# Patient Record
Sex: FEMALE | Race: WHITE | NOT HISPANIC OR LATINO | Employment: OTHER | URBAN - METROPOLITAN AREA
[De-identification: names, ages, dates, MRNs, and addresses within clinical notes are randomized per-mention and may not be internally consistent; named-entity substitution may affect disease eponyms.]

---

## 2017-01-10 ENCOUNTER — TELEPHONE (OUTPATIENT)
Dept: INTERNAL MEDICINE | Facility: CLINIC | Age: 67
End: 2017-01-10

## 2017-01-10 DIAGNOSIS — K29.80 DUODENITIS: ICD-10-CM

## 2017-01-29 DIAGNOSIS — R41.3 MEMORY LOSS: ICD-10-CM

## 2017-01-30 RX ORDER — DONEPEZIL HYDROCHLORIDE 10 MG/1
TABLET, FILM COATED ORAL
Qty: 90 TABLET | Refills: 0 | Status: SHIPPED | OUTPATIENT
Start: 2017-01-30 | End: 2017-02-01 | Stop reason: ALTCHOICE

## 2017-01-30 RX ORDER — LOSARTAN POTASSIUM AND HYDROCHLOROTHIAZIDE 12.5; 5 MG/1; MG/1
TABLET ORAL
Qty: 90 TABLET | Refills: 0 | Status: SHIPPED | OUTPATIENT
Start: 2017-01-30 | End: 2017-02-01 | Stop reason: SDUPTHER

## 2017-02-01 ENCOUNTER — OFFICE VISIT (OUTPATIENT)
Dept: INTERNAL MEDICINE | Facility: CLINIC | Age: 67
End: 2017-02-01
Payer: MEDICARE

## 2017-02-01 VITALS
BODY MASS INDEX: 36.52 KG/M2 | SYSTOLIC BLOOD PRESSURE: 124 MMHG | WEIGHT: 198.44 LBS | HEART RATE: 68 BPM | HEIGHT: 62 IN | DIASTOLIC BLOOD PRESSURE: 80 MMHG

## 2017-02-01 DIAGNOSIS — F33.41 RECURRENT MAJOR DEPRESSIVE DISORDER, IN PARTIAL REMISSION: ICD-10-CM

## 2017-02-01 DIAGNOSIS — E03.9 PRIMARY HYPOTHYROIDISM: ICD-10-CM

## 2017-02-01 DIAGNOSIS — R19.5 POSITIVE OCCULT STOOL BLOOD TEST: ICD-10-CM

## 2017-02-01 DIAGNOSIS — F07.81 CONCUSSION SYNDROME: ICD-10-CM

## 2017-02-01 DIAGNOSIS — R41.3 MEMORY LOSS: ICD-10-CM

## 2017-02-01 DIAGNOSIS — L65.9 HAIR LOSS: ICD-10-CM

## 2017-02-01 DIAGNOSIS — E61.1 IRON DEFICIENCY: Primary | ICD-10-CM

## 2017-02-01 DIAGNOSIS — G25.81 RLS (RESTLESS LEGS SYNDROME): ICD-10-CM

## 2017-02-01 DIAGNOSIS — Z78.0 POSTMENOPAUSAL STATUS: ICD-10-CM

## 2017-02-01 DIAGNOSIS — G25.0 TREMOR, ESSENTIAL: ICD-10-CM

## 2017-02-01 DIAGNOSIS — I10 ESSENTIAL HYPERTENSION: ICD-10-CM

## 2017-02-01 PROCEDURE — 1126F AMNT PAIN NOTED NONE PRSNT: CPT | Mod: S$GLB,,, | Performed by: INTERNAL MEDICINE

## 2017-02-01 PROCEDURE — 1160F RVW MEDS BY RX/DR IN RCRD: CPT | Mod: S$GLB,,, | Performed by: INTERNAL MEDICINE

## 2017-02-01 PROCEDURE — 1159F MED LIST DOCD IN RCRD: CPT | Mod: S$GLB,,, | Performed by: INTERNAL MEDICINE

## 2017-02-01 PROCEDURE — 3079F DIAST BP 80-89 MM HG: CPT | Mod: S$GLB,,, | Performed by: INTERNAL MEDICINE

## 2017-02-01 PROCEDURE — 1157F ADVNC CARE PLAN IN RCRD: CPT | Mod: S$GLB,,, | Performed by: INTERNAL MEDICINE

## 2017-02-01 PROCEDURE — 99999 PR PBB SHADOW E&M-EST. PATIENT-LVL III: CPT | Mod: PBBFAC,,, | Performed by: INTERNAL MEDICINE

## 2017-02-01 PROCEDURE — 3074F SYST BP LT 130 MM HG: CPT | Mod: S$GLB,,, | Performed by: INTERNAL MEDICINE

## 2017-02-01 PROCEDURE — 99214 OFFICE O/P EST MOD 30 MIN: CPT | Mod: S$GLB,,, | Performed by: INTERNAL MEDICINE

## 2017-02-01 RX ORDER — LOSARTAN POTASSIUM AND HYDROCHLOROTHIAZIDE 12.5; 5 MG/1; MG/1
1 TABLET ORAL EVERY MORNING
Qty: 90 TABLET | Refills: 3 | Status: SHIPPED | OUTPATIENT
Start: 2017-02-01 | End: 2017-05-02 | Stop reason: SDUPTHER

## 2017-02-01 RX ORDER — PANTOPRAZOLE SODIUM 20 MG/1
20 TABLET, DELAYED RELEASE ORAL EVERY MORNING
Qty: 90 TABLET | Refills: 3 | Status: SHIPPED | OUTPATIENT
Start: 2017-02-01 | End: 2018-01-26 | Stop reason: SDUPTHER

## 2017-02-01 RX ORDER — PRAMIPEXOLE DIHYDROCHLORIDE 0.5 MG/1
TABLET ORAL
Qty: 90 TABLET | Refills: 0 | Status: SHIPPED | OUTPATIENT
Start: 2017-02-01 | End: 2017-05-02 | Stop reason: ALTCHOICE

## 2017-02-01 RX ORDER — TRAZODONE HYDROCHLORIDE 50 MG/1
50 TABLET ORAL NIGHTLY
Qty: 30 TABLET | Refills: 11
Start: 2017-02-01 | End: 2017-05-02 | Stop reason: SDUPTHER

## 2017-02-01 RX ORDER — KETOCONAZOLE 20 MG/ML
SHAMPOO, SUSPENSION TOPICAL
Qty: 120 ML | Refills: 4 | Status: SHIPPED | OUTPATIENT
Start: 2017-02-02 | End: 2018-08-16

## 2017-02-01 RX ORDER — DONEPEZIL HYDROCHLORIDE 5 MG/1
5 TABLET, FILM COATED ORAL NIGHTLY
Qty: 90 TABLET | Refills: 4 | Status: SHIPPED | OUTPATIENT
Start: 2017-02-01 | End: 2017-05-02 | Stop reason: SDUPTHER

## 2017-02-01 NOTE — PATIENT INSTRUCTIONS
Health Maintenance       Date Due Completion Date    DEXA SCAN 5/11/1989 ---    Colonoscopy 5/11/1999 ---    Zoster Vaccine 5/11/2009 ---    Influenza Vaccine 8/1/2016 12/1/2015    Mammogram 8/29/2018 8/29/2016    Lipid Panel 8/25/2021 8/25/2016    TETANUS VACCINE 11/4/2024 11/4/2014

## 2017-02-01 NOTE — MR AVS SNAPSHOT
[facility]  [address]  [facility] [address]  Phone: [phone]  Fax: [phone]                  Trinity Valenzuela   2017 1:20 PM   Office Visit    Description:  Female : 1950   Provider:  Jesika Sanders MD   Department:  [facility]           Reason for Visit     Follow-up           Diagnoses this Visit        Comments    Iron deficiency    -  Primary     Positive occult stool blood test         Primary hypothyroidism         Hair loss         Recurrent major depressive disorder, in partial remission         RLS (restless legs syndrome)         Tremor, essential         Concussion syndrome         Memory loss         Essential hypertension         Postmenopausal status                To Do List           Goals (5 Years of Data)     None      Follow-Up and Disposition     Return in about 3 months (around 2017).       These Medications        Disp Refills Start End    ketoconazole (NIZORAL) 2 % shampoo 120 mL 4 2017     Apply topically twice a week. - Topical (Top)    Pharmacy: [facility] [address] [place] [address] [address] Ph #: [phone]       trazodone (DESYREL) 50 MG tablet 30 tablet 11 2017    Take 1 tablet (50 mg total) by mouth every evening. - Oral    Pharmacy: [address] [address] Ph #: [phone]       losartan-hydrochlorothiazide 50-12.5 mg (HYZAAR) 50-12.5 mg per tablet 90 tablet 3 2017     Take 1 tablet by mouth every morning. - Oral    Pharmacy: [address] Ph #: [phone]       donepezil (ARICEPT) 5 MG tablet 90 tablet 4 2017    Take 1 tablet (5 mg total) by mouth every evening. - Oral    Pharmacy: [facility] [address] [place] [address] [facility] [address] Ph #: [phone]       pantoprazole (PROTONIX) 20 MG tablet 90 tablet 3 2017    Take 1 tablet (20 mg total) by mouth every morning. - Oral     Pharmacy: [facility] [address] [place] [address], [place] - [address] Ph #: [phone]       pramipexole (MIRAPEX) 0.5 MG tablet 90 tablet 0 2/1/2017     Nightly by mouth prn    Pharmacy: [facility] [address] [place] [address], [place] - [address] Ph #: [phone]         [facility] On Call     [facility] On Call Nurse [facility] Line - 24/7 Assistance  Registered nurses in the [facility] provide clinical advisement, health education, appointment booking, and other advisory services.  Call for this free service at [phone].             Medications           Message regarding Medications     Verify the changes and/or additions to your medication regime listed below are the same as discussed with your clinician today.  If any of these changes or additions are incorrect, please notify your healthcare provider.        START taking these NEW medications        Refills    trazodone (DESYREL) 50 MG tablet 11    Sig: Take 1 tablet (50 mg total) by mouth every evening.    Class: No Print    Route: Oral    donepezil (ARICEPT) 5 MG tablet 4    Sig: Take 1 tablet (5 mg total) by mouth every evening.    Class: Normal    Route: Oral    pantoprazole (PROTONIX) 20 MG tablet 3    Sig: Take 1 tablet (20 mg total) by mouth every morning.    Class: Normal    Route: Oral      CHANGE how you are taking these medications     Start Taking Instead of    ketoconazole (NIZORAL) 2 % shampoo ketoconazole (NIZORAL) 2 % shampoo    Dosage:  Apply topically twice a week. Dosage:  Apply topically twice a week.    Reason for Change:  Reorder     Starting on: 2/2/2017     losartan-hydrochlorothiazide 50-12.5 mg (HYZAAR) 50-12.5 mg per tablet losartan-hydrochlorothiazide 50-12.5 mg (HYZAAR) 50-12.5 mg per tablet    Dosage:  Take 1 tablet by mouth every morning. Dosage:  TAKE ONE TABLET BY MOUTH ONCE DAILY    Reason for Change:  Reorder            Verify that the below list of medications is an accurate representation of the medications you are currently  "taking.  If none reported, the list may be blank. If incorrect, please contact your healthcare provider. Carry this list with you in case of emergency.           Current Medications     aspirin (ECOTRIN) 81 MG EC tablet Take 81 mg by mouth once daily.    citalopram (CELEXA) 40 MG tablet Take 1 tablet (40 mg total) by mouth once daily.    ketoconazole (NIZORAL) 2 % shampoo Starting on Feb 02, 2017. Apply topically twice a week.    levocetirizine (XYZAL) 5 MG tablet Take 1 tablet (5 mg total) by mouth every evening.    levothyroxine (SYNTHROID) 100 MCG tablet Take 1 tablet (100 mcg total) by mouth before breakfast.    losartan-hydrochlorothiazide 50-12.5 mg (HYZAAR) 50-12.5 mg per tablet Take 1 tablet by mouth every morning.    pramipexole (MIRAPEX) 0.5 MG tablet Nightly by mouth prn    donepezil (ARICEPT) 5 MG tablet Take 1 tablet (5 mg total) by mouth every evening.    pantoprazole (PROTONIX) 20 MG tablet Take 1 tablet (20 mg total) by mouth every morning.    trazodone (DESYREL) 50 MG tablet Take 1 tablet (50 mg total) by mouth every evening.           Clinical Reference Information           Vital Signs - Last Recorded  Most recent update: 2/1/2017  1:24 PM by Loco Villeda MA    BP Pulse Ht Wt BMI    124/80 68 5' 2" (1.575 m) 90 kg (198 lb 6.6 oz) 36.29 kg/m2      Blood Pressure          Most Recent Value    BP  124/80      Allergies as of 2/1/2017     No Known Allergies      Immunizations Administered on Date of Encounter - 2/1/2017     None      Orders Placed During Today's Visit      Normal Orders This Visit    Ambulatory Referral to Psychiatry     Future Labs/Procedures Expected by Expires    DXA Bone Density Spine And Hip  2/1/2017 2/1/2018    CBC auto differential  3/6/2017 2/1/2018    Comprehensive metabolic panel  3/6/2017 4/2/2018    Ferritin  3/6/2017 2/1/2018    TSH  3/6/2017 4/2/2018    Uric acid  3/6/2017 4/2/2018      Instructions    Health Maintenance       Date Due Completion Date    DEXA " SCAN 5/11/1989 ---    Colonoscopy 5/11/1999 ---    Zoster Vaccine 5/11/2009 ---    Influenza Vaccine 8/1/2016 12/1/2015    Mammogram 8/29/2018 8/29/2016    Lipid Panel 8/25/2021 8/25/2016    TETANUS VACCINE 11/4/2024 11/4/2014

## 2017-02-02 NOTE — PROGRESS NOTES
Subjective:       Patient ID: Trinity Valenzuela is a 66 y.o. female.    Chief Complaint: Follow-up  She has had an EGD and colonoscopy as well as a capsule video examination of her entire gastrointestinal tract with no source for the GI bleeding discovered.  The plan will be to monitor her blood count every 3 months or sooner if she has symptoms.    I will also monitor and treat her Essential hypertension, general medical problems and her thyroid problems.    I would however like for her To establish with a psychiatrist to manage her major depression and other neurologic issues. She is doing well now and I will refill all of her prescriptions  However, long-term I think she should establish with a psychiatrist.  HPI  Review of Systems   Constitutional: Negative for activity change, appetite change, chills, fatigue, fever and unexpected weight change.   HENT: Negative for hearing loss.    Eyes: Negative for visual disturbance.   Respiratory: Negative for cough, chest tightness, shortness of breath and wheezing.    Cardiovascular: Negative for chest pain, palpitations and leg swelling.   Gastrointestinal: Negative for abdominal pain, constipation, nausea and vomiting.   Genitourinary: Negative for dysuria, frequency and urgency.   Musculoskeletal: Negative for arthralgias, back pain, gait problem, joint swelling and myalgias.   Skin: Negative for rash.   Neurological: Negative for light-headedness and headaches.   Psychiatric/Behavioral: Negative for dysphoric mood and sleep disturbance. The patient is not nervous/anxious.        Objective:      Physical Exam   Constitutional: She appears well-nourished.   HENT:   Head: Atraumatic.   Eyes: Conjunctivae are normal. No scleral icterus.   Neck: Neck supple.   Cardiovascular: Normal rate and regular rhythm.    Pulmonary/Chest: Effort normal and breath sounds normal.   Abdominal: Soft. There is no tenderness.   Musculoskeletal: She exhibits no edema.   Lymphadenopathy:      She has no cervical adenopathy.   Neurological: She is alert.   Skin: Skin is warm and dry.   Psychiatric: She has a normal mood and affect. Her behavior is normal.   Nursing note and vitals reviewed.      Assessment:       1. Iron deficiency    2. Positive occult stool blood test    3. Primary hypothyroidism    4. Hair loss    5. Recurrent major depressive disorder, in partial remission    6. RLS (restless legs syndrome)    7. Tremor, essential    8. Concussion syndrome    9. Memory loss    10. Essential hypertension    11. Postmenopausal status        Plan:   Trinity was seen today for follow-up.    Diagnoses and all orders for this visit:    Iron deficiency  -     CBC auto differential; Future  -     Ferritin; Future    Positive occult stool blood test  -     CBC auto differential; Future    Primary hypothyroidism  -     TSH; Future    Hair loss    Recurrent major depressive disorder, in partial remission  -     Ambulatory Referral to Psychiatry    RLS (restless legs syndrome)  -     Ambulatory Referral to Psychiatry    Tremor, essential  -     Ambulatory Referral to Psychiatry    Concussion syndrome  -     Ambulatory Referral to Psychiatry    Memory loss  -     Ambulatory Referral to Psychiatry    Essential hypertension  -     Comprehensive metabolic panel; Future  -     Uric acid; Future    Postmenopausal status  -     DXA Bone Density Spine And Hip; Future    Other orders  -     ketoconazole (NIZORAL) 2 % shampoo; Apply topically twice a week.  -     trazodone (DESYREL) 50 MG tablet; Take 1 tablet (50 mg total) by mouth every evening.  -     losartan-hydrochlorothiazide 50-12.5 mg (HYZAAR) 50-12.5 mg per tablet; Take 1 tablet by mouth every morning.  -     donepezil (ARICEPT) 5 MG tablet; Take 1 tablet (5 mg total) by mouth every evening.  -     pantoprazole (PROTONIX) 20 MG tablet; Take 1 tablet (20 mg total) by mouth every morning.  -     pramipexole (MIRAPEX) 0.5 MG tablet; Nightly by mouth prn

## 2017-03-28 ENCOUNTER — TELEPHONE (OUTPATIENT)
Dept: INTERNAL MEDICINE | Facility: CLINIC | Age: 67
End: 2017-03-28

## 2017-03-28 ENCOUNTER — PATIENT MESSAGE (OUTPATIENT)
Dept: INTERNAL MEDICINE | Facility: CLINIC | Age: 67
End: 2017-03-28

## 2017-03-28 NOTE — TELEPHONE ENCOUNTER
Dear Herlinda,  She is scheduled for bilateral eyelid blepharoplasty with Dr. Doron Santos on May 30, 2017.  She is cleared from a medical and cardiac standpoint at a low risk for this procedure will need her most recent office visit dated February 1, 2017 and her most recent labs dated March 21, 2017 faxed to Dr. Santos's office or emailed  I have placed all of this information on your desk.  Sincerely, Dr. Jesika Sanders

## 2017-03-29 ENCOUNTER — PATIENT MESSAGE (OUTPATIENT)
Dept: INTERNAL MEDICINE | Facility: CLINIC | Age: 67
End: 2017-03-29

## 2017-03-29 DIAGNOSIS — I10 ESSENTIAL HYPERTENSION: ICD-10-CM

## 2017-03-29 DIAGNOSIS — F90.0 ATTENTION DEFICIT HYPERACTIVITY DISORDER (ADHD), PREDOMINANTLY INATTENTIVE TYPE: Primary | ICD-10-CM

## 2017-03-31 ENCOUNTER — HOSPITAL ENCOUNTER (OUTPATIENT)
Dept: CARDIOLOGY | Facility: CLINIC | Age: 67
Discharge: HOME | End: 2017-03-31
Payer: MEDICARE

## 2017-03-31 ENCOUNTER — TELEPHONE (OUTPATIENT)
Dept: INTERNAL MEDICINE | Facility: CLINIC | Age: 67
End: 2017-03-31

## 2017-03-31 DIAGNOSIS — F90.0 ATTENTION DEFICIT HYPERACTIVITY DISORDER (ADHD), PREDOMINANTLY INATTENTIVE TYPE: ICD-10-CM

## 2017-03-31 DIAGNOSIS — I10 ESSENTIAL HYPERTENSION: ICD-10-CM

## 2017-03-31 PROCEDURE — 93000 ELECTROCARDIOGRAM COMPLETE: CPT | Mod: S$GLB,,, | Performed by: INTERNAL MEDICINE

## 2017-03-31 NOTE — TELEPHONE ENCOUNTER
She is scheduled for bilateral eyelid blepharoplasty with Dr. Doron Santos on May 30, 2017.  Please Fax her EKG  to [phone]. Thank you very much.

## 2017-04-11 ENCOUNTER — PATIENT MESSAGE (OUTPATIENT)
Dept: INTERNAL MEDICINE | Facility: CLINIC | Age: 67
End: 2017-04-11

## 2017-05-02 ENCOUNTER — OFFICE VISIT (OUTPATIENT)
Dept: INTERNAL MEDICINE | Facility: CLINIC | Age: 67
End: 2017-05-02
Payer: MEDICARE

## 2017-05-02 VITALS
HEIGHT: 62 IN | DIASTOLIC BLOOD PRESSURE: 80 MMHG | WEIGHT: 192.44 LBS | SYSTOLIC BLOOD PRESSURE: 120 MMHG | HEART RATE: 71 BPM | BODY MASS INDEX: 35.41 KG/M2 | OXYGEN SATURATION: 95 %

## 2017-05-02 DIAGNOSIS — F07.81 CONCUSSION SYNDROME: ICD-10-CM

## 2017-05-02 DIAGNOSIS — G25.0 TREMOR, ESSENTIAL: ICD-10-CM

## 2017-05-02 DIAGNOSIS — R41.3 MEMORY LOSS: ICD-10-CM

## 2017-05-02 DIAGNOSIS — Z86.19 HISTORY OF HEPATITIS C: ICD-10-CM

## 2017-05-02 DIAGNOSIS — G25.81 RLS (RESTLESS LEGS SYNDROME): ICD-10-CM

## 2017-05-02 DIAGNOSIS — R16.0 HEPATOMEGALY: ICD-10-CM

## 2017-05-02 DIAGNOSIS — F33.41 RECURRENT MAJOR DEPRESSIVE DISORDER, IN PARTIAL REMISSION: ICD-10-CM

## 2017-05-02 DIAGNOSIS — E61.1 IRON DEFICIENCY: ICD-10-CM

## 2017-05-02 DIAGNOSIS — E03.9 PRIMARY HYPOTHYROIDISM: ICD-10-CM

## 2017-05-02 DIAGNOSIS — K21.9 GASTROESOPHAGEAL REFLUX DISEASE WITHOUT ESOPHAGITIS: ICD-10-CM

## 2017-05-02 DIAGNOSIS — K29.80 DUODENITIS: ICD-10-CM

## 2017-05-02 DIAGNOSIS — I10 ESSENTIAL HYPERTENSION: ICD-10-CM

## 2017-05-02 DIAGNOSIS — G47.00 INSOMNIA, UNSPECIFIED TYPE: ICD-10-CM

## 2017-05-02 DIAGNOSIS — D50.0 IRON DEFICIENCY ANEMIA DUE TO CHRONIC BLOOD LOSS: Primary | ICD-10-CM

## 2017-05-02 PROCEDURE — 3074F SYST BP LT 130 MM HG: CPT | Mod: S$GLB,,, | Performed by: INTERNAL MEDICINE

## 2017-05-02 PROCEDURE — 99999 PR PBB SHADOW E&M-EST. PATIENT-LVL III: CPT | Mod: PBBFAC,,, | Performed by: INTERNAL MEDICINE

## 2017-05-02 PROCEDURE — 3079F DIAST BP 80-89 MM HG: CPT | Mod: S$GLB,,, | Performed by: INTERNAL MEDICINE

## 2017-05-02 PROCEDURE — 99214 OFFICE O/P EST MOD 30 MIN: CPT | Mod: S$GLB,,, | Performed by: INTERNAL MEDICINE

## 2017-05-02 PROCEDURE — 1159F MED LIST DOCD IN RCRD: CPT | Mod: S$GLB,,, | Performed by: INTERNAL MEDICINE

## 2017-05-02 PROCEDURE — 1126F AMNT PAIN NOTED NONE PRSNT: CPT | Mod: S$GLB,,, | Performed by: INTERNAL MEDICINE

## 2017-05-02 PROCEDURE — 1160F RVW MEDS BY RX/DR IN RCRD: CPT | Mod: S$GLB,,, | Performed by: INTERNAL MEDICINE

## 2017-05-02 RX ORDER — CITALOPRAM 40 MG/1
40 TABLET, FILM COATED ORAL DAILY
Qty: 90 TABLET | Refills: 2 | Status: SHIPPED | OUTPATIENT
Start: 2017-05-02 | End: 2018-03-02 | Stop reason: SDUPTHER

## 2017-05-02 RX ORDER — TRAZODONE HYDROCHLORIDE 50 MG/1
50 TABLET ORAL NIGHTLY
Qty: 90 TABLET | Refills: 3
Start: 2017-05-02 | End: 2018-03-29

## 2017-05-02 RX ORDER — DONEPEZIL HYDROCHLORIDE 5 MG/1
5 TABLET, FILM COATED ORAL NIGHTLY
Qty: 90 TABLET | Refills: 4 | Status: SHIPPED | OUTPATIENT
Start: 2017-05-02 | End: 2018-03-29 | Stop reason: SDUPTHER

## 2017-05-02 RX ORDER — LOSARTAN POTASSIUM AND HYDROCHLOROTHIAZIDE 12.5; 5 MG/1; MG/1
1 TABLET ORAL EVERY MORNING
Qty: 90 TABLET | Refills: 3 | Status: SHIPPED | OUTPATIENT
Start: 2017-05-02 | End: 2018-03-29 | Stop reason: SDUPTHER

## 2017-05-02 RX ORDER — ASPIRIN 81 MG/1
81 TABLET ORAL DAILY
Qty: 100 TABLET | Refills: 3
Start: 2017-05-02 | End: 2018-03-29 | Stop reason: SINTOL

## 2017-05-02 RX ORDER — LEVOTHYROXINE SODIUM 100 UG/1
100 TABLET ORAL
Qty: 90 TABLET | Refills: 3 | Status: SHIPPED | OUTPATIENT
Start: 2017-05-02 | End: 2017-07-26 | Stop reason: SDUPTHER

## 2017-05-02 NOTE — MR AVS SNAPSHOT
[facility]  [address]  [facility] [address]  Phone: [phone]  Fax: [phone]                  Trinity Valenzuela   2017 10:40 AM   Office Visit    Description:  Female : 1950   Provider:  Jesika Sanders MD   Department:  [facility]           Reason for Visit     Follow-up           Diagnoses this Visit        Comments    Iron deficiency anemia due to chronic blood loss    -  Primary     History of hepatitis C         Tremor, essential         RLS (restless legs syndrome)         Insomnia, unspecified type         Iron deficiency         Primary hypothyroidism         Memory loss         Recurrent major depressive disorder, in partial remission         Essential hypertension         Gastroesophageal reflux disease without esophagitis         Duodenitis         Concussion syndrome         Hepatomegaly                To Do List           Goals (5 Years of Data)     None      Follow-Up and Disposition     Return in about 6 months (around 2017).       These Medications        Disp Refills Start End    trazodone (DESYREL) 50 MG tablet 90 tablet 3 2017    Take 1 tablet (50 mg total) by mouth every evening. - Oral    Pharmacy: [address] [address] Ph #: [phone]       losartan-hydrochlorothiazide 50-12.5 mg (HYZAAR) 50-12.5 mg per tablet 90 tablet 3 2017     Take 1 tablet by mouth every morning. - Oral    Pharmacy: [address] [address] [facility] [address] Ph #: [phone]       levothyroxine (SYNTHROID) 100 MCG tablet 90 tablet 3 2017     Take 1 tablet (100 mcg total) by mouth before breakfast. - Oral    Pharmacy: [address] [address] [facility] [address] Ph #: [phone]       donepezil (ARICEPT) 5 MG tablet 90 tablet 4 2017    Take 1 tablet (5 mg total) by mouth every evening. - Oral    Pharmacy: [address] [address] [facility] [address]  Ph #: [phone]       citalopram (CELEXA) 40 MG tablet 90 tablet 2 5/2/2017 5/2/2018    Take 1 tablet (40 mg total) by mouth once daily. - Oral    Pharmacy: [facility] - [place], [place] - [address] [address] Ph #: [phone]       aspirin (ECOTRIN) 81 MG EC tablet 100 tablet 3 5/2/2017     Take 1 tablet (81 mg total) by mouth once daily. - Oral    Pharmacy: [facility]  [place] - [address] [address] Ph #: [phone]         [facility] On Call     [facility] On Call Nurse Care Line - 24/7 Assistance  Unless otherwise directed by your provider, please contact [facility], our nurse care line that is available for 24/7 assistance.     Registered nurses in the [facility] provide: appointment scheduling, clinical advisement, health education, and other advisory services.  Call: [phone] (toll free)               Medications           Message regarding Medications     Verify the changes and/or additions to your medication regime listed below are the same as discussed with your clinician today.  If any of these changes or additions are incorrect, please notify your healthcare provider.        CHANGE how you are taking these medications     Start Taking Instead of    aspirin (ECOTRIN) 81 MG EC tablet aspirin (ECOTRIN) 81 MG EC tablet    Dosage:  Take 1 tablet (81 mg total) by mouth once daily. Dosage:  Take 81 mg by mouth once daily.    Reason for Change:  Reorder       STOP taking these medications     pramipexole (MIRAPEX) 0.5 MG tablet Nightly by mouth prn    levocetirizine (XYZAL) 5 MG tablet Take 1 tablet (5 mg total) by mouth every evening.           Verify that the below list of medications is an accurate representation of the medications you are currently taking.  If none reported, the list may be blank. If incorrect, please contact your healthcare provider. Carry this list with you in case of emergency.           Current Medications     aspirin (ECOTRIN) 81 MG EC tablet Take 1 tablet  "(81 mg total) by mouth once daily.    citalopram (CELEXA) 40 MG tablet Take 1 tablet (40 mg total) by mouth once daily.    donepezil (ARICEPT) 5 MG tablet Take 1 tablet (5 mg total) by mouth every evening.    ketoconazole (NIZORAL) 2 % shampoo Apply topically twice a week.    levothyroxine (SYNTHROID) 100 MCG tablet Take 1 tablet (100 mcg total) by mouth before breakfast.    losartan-hydrochlorothiazide 50-12.5 mg (HYZAAR) 50-12.5 mg per tablet Take 1 tablet by mouth every morning.    pantoprazole (PROTONIX) 20 MG tablet Take 1 tablet (20 mg total) by mouth every morning.    trazodone (DESYREL) 50 MG tablet Take 1 tablet (50 mg total) by mouth every evening.           Clinical Reference Information           Your Vitals Were     BP Pulse Height Weight SpO2 BMI    120/80 71 5' 2" (1.575 m) 87.3 kg (192 lb 7.4 oz) 95% 35.2 kg/m2      Blood Pressure          Most Recent Value    BP  120/80      Allergies as of 5/2/2017     No Known Allergies      Immunizations Administered on Date of Encounter - 5/2/2017     None      Orders Placed During Today's Visit     Future Labs/Procedures Expected by Expires    US Abdomen Complete  5/2/2017 5/2/2018    CBC auto differential  7/24/2017 5/2/2018    Comprehensive metabolic panel  7/24/2017 7/1/2018    Ferritin  7/24/2017 5/2/2018    HEPATITIS C RNA, QUANTITATIVE, PCR  7/24/2017 7/1/2018    TSH  7/24/2017 7/1/2018      Language Assistance Services     ATTENTION: Language assistance services are available, free of charge. Please call [phone].      ATENCIÓN: Si habla español, tiene a schulte disposición servicios gratuitos de asistencia lingüística. Llame al [phone].     VICTOR HUGO Ý: N?u b?n nói Ti?ng Vi?t, có các d?ch v? h? tr? ngôn ng? mi?n phí dành cho b?n. G?i s? [phone].         [facility] complies with applicable Federal civil rights laws and does not discriminate on the basis of race, color, national origin, age, disability, or sex.        "

## 2017-05-03 NOTE — PROGRESS NOTES
Subjective:       Patient ID: Trinity Valenzuela is a 66 y.o. female.    Chief Complaint: Follow-up  She is here to address all medical problems.  As far as she knows, she hasn't lost any blood.  She doesn't think hepatitis C he is active.  She's not having as much trouble with tremor or restless leg syndrome.  She is sleeping better.  She is taking an iron tablet every day.  Her memory is stable.  Her depression is under good control.  HPI  Review of Systems   Constitutional: Negative for activity change, appetite change, chills, fatigue, fever and unexpected weight change.   HENT: Negative for hearing loss.    Eyes: Negative for visual disturbance.   Respiratory: Negative for cough, chest tightness, shortness of breath and wheezing.    Cardiovascular: Negative for chest pain, palpitations and leg swelling.   Gastrointestinal: Negative for abdominal pain, constipation, nausea and vomiting.   Genitourinary: Negative for dysuria, frequency and urgency.   Musculoskeletal: Negative for arthralgias, back pain, gait problem, joint swelling and myalgias.   Skin: Negative for rash.   Neurological: Negative for light-headedness and headaches.   Psychiatric/Behavioral: Negative for dysphoric mood and sleep disturbance. The patient is not nervous/anxious.        Objective:      Physical Exam   Constitutional: She is oriented to person, place, and time. She appears well-developed and well-nourished. No distress.   HENT:   Head: Normocephalic and atraumatic.   Right Ear: External ear normal.   Left Ear: External ear normal.   Nose: Nose normal.   Mouth/Throat: Oropharynx is clear and moist. No oropharyngeal exudate.   Eyes: Conjunctivae and EOM are normal. Pupils are equal, round, and reactive to light. Right eye exhibits no discharge. No scleral icterus.   Neck: Normal range of motion and full passive range of motion without pain. Neck supple. No spinous process tenderness and no muscular tenderness present. Carotid bruit is  not present. No thyromegaly present.   Cardiovascular: Normal rate, regular rhythm, S1 normal, S2 normal, normal heart sounds and intact distal pulses.  Exam reveals no gallop and no friction rub.    No murmur heard.  Pulmonary/Chest: Effort normal and breath sounds normal. No respiratory distress. She has no wheezes. She has no rales. She exhibits no tenderness.   Abdominal: Soft. Bowel sounds are normal. She exhibits no distension and no mass. There is no tenderness. There is no rebound and no guarding.   Genitourinary: Pelvic exam was performed with patient supine. Uterus is not deviated, not enlarged, not fixed and not tender. Cervix exhibits no motion tenderness, no discharge and no friability. Right adnexum displays no mass, no tenderness and no fullness. Left adnexum displays no mass, no tenderness and no fullness.   Musculoskeletal: Normal range of motion. She exhibits no edema or tenderness.   Lymphadenopathy:        Head (right side): No submental and no submandibular adenopathy present.        Head (left side): No submental and no submandibular adenopathy present.     She has no cervical adenopathy.        Right cervical: No superficial cervical, no deep cervical and no posterior cervical adenopathy present.       Left cervical: No superficial cervical, no deep cervical and no posterior cervical adenopathy present.        Right axillary: No pectoral and no lateral adenopathy present.        Left axillary: No pectoral and no lateral adenopathy present.       Right: No supraclavicular adenopathy present.        Left: No supraclavicular adenopathy present.   Neurological: She is alert and oriented to person, place, and time. She has normal reflexes. No cranial nerve deficit. She exhibits normal muscle tone. Coordination normal.   Skin: Skin is warm and dry. No rash noted.   Psychiatric: She has a normal mood and affect. Her behavior is normal. Her mood appears not anxious. Her speech is not rapid and/or  pressured. She is not agitated. She does not exhibit a depressed mood.   Normal behavior, thought content, insight and judgement.       Assessment:       1. Iron deficiency anemia due to chronic blood loss    2. History of hepatitis C, treated, no virus detectable 2015    3. Tremor, essential    4. RLS (restless legs syndrome)    5. Insomnia, unspecified type    6. Iron deficiency    7. Primary hypothyroidism    8. Memory loss    9. Recurrent major depressive disorder, in partial remission    10. Essential hypertension    11. Gastroesophageal reflux disease without esophagitis    12. Duodenitis, EGD 12-    13. Concussion syndrome    14. Hepatomegaly        Plan:   Trinity was seen today for follow-up.    Diagnoses and all orders for this visit:    Iron deficiency anemia due to chronic blood loss  -     Ferritin; Future  -     CBC auto differential; Future    History of hepatitis C, treated, no virus detectable 2015.  Her liver on physical examination is abnormal.  Will check an ultrasound.  -     Comprehensive metabolic panel; Future  -     US Abdomen Complete; Future  -     HEPATITIS C RNA, QUANTITATIVE, PCR; Future    Tremor, essential  RLS (restless legs syndrome)  Insomnia, unspecified type  All of this is improved on a simpler medication regimen.    Iron deficiency.  Monitor.    Primary hypothyroidism  -     TSH; Future    Memory loss.  Stable.    Recurrent major depressive disorder, in partial remission  Doing well.  Mood stable.    Essential hypertension.  Well controlled.  No compliance problems.    Gastroesophageal reflux disease without esophagitis.  Well controlled.    Duodenitis, EGD 12-.  No symptoms now.    Concussion syndrome.  No change.    Hepatomegaly  -     US Abdomen Complete; Future  -     HEPATITIS C RNA, QUANTITATIVE, PCR; Future    Other orders  -     trazodone (DESYREL) 50 MG tablet; Take 1 tablet (50 mg total) by mouth every evening.  -     losartan-hydrochlorothiazide  50-12.5 mg (HYZAAR) 50-12.5 mg per tablet; Take 1 tablet by mouth every morning.  -     levothyroxine (SYNTHROID) 100 MCG tablet; Take 1 tablet (100 mcg total) by mouth before breakfast.  -     donepezil (ARICEPT) 5 MG tablet; Take 1 tablet (5 mg total) by mouth every evening.  -     citalopram (CELEXA) 40 MG tablet; Take 1 tablet (40 mg total) by mouth once daily.  -     aspirin (ECOTRIN) 81 MG EC tablet; Take 1 tablet (81 mg total) by mouth once daily.    Medication List with Changes/Refills   Current Medications    KETOCONAZOLE (NIZORAL) 2 % SHAMPOO    Apply topically twice a week.    PANTOPRAZOLE (PROTONIX) 20 MG TABLET    Take 1 tablet (20 mg total) by mouth every morning.   Changed and/or Refilled Medications    Modified Medication Previous Medication    ASPIRIN (ECOTRIN) 81 MG EC TABLET aspirin (ECOTRIN) 81 MG EC tablet       Take 1 tablet (81 mg total) by mouth once daily.    Take 81 mg by mouth once daily.    CITALOPRAM (CELEXA) 40 MG TABLET citalopram (CELEXA) 40 MG tablet       Take 1 tablet (40 mg total) by mouth once daily.    Take 1 tablet (40 mg total) by mouth once daily.    DONEPEZIL (ARICEPT) 5 MG TABLET donepezil (ARICEPT) 5 MG tablet       Take 1 tablet (5 mg total) by mouth every evening.    Take 1 tablet (5 mg total) by mouth every evening.    LEVOTHYROXINE (SYNTHROID) 100 MCG TABLET levothyroxine (SYNTHROID) 100 MCG tablet       Take 1 tablet (100 mcg total) by mouth before breakfast.    Take 1 tablet (100 mcg total) by mouth before breakfast.    LOSARTAN-HYDROCHLOROTHIAZIDE 50-12.5 MG (HYZAAR) 50-12.5 MG PER TABLET losartan-hydrochlorothiazide 50-12.5 mg (HYZAAR) 50-12.5 mg per tablet       Take 1 tablet by mouth every morning.    Take 1 tablet by mouth every morning.    TRAZODONE (DESYREL) 50 MG TABLET trazodone (DESYREL) 50 MG tablet       Take 1 tablet (50 mg total) by mouth every evening.    Take 1 tablet (50 mg total) by mouth every evening.   Discontinued Medications    LEVOCETIRIZINE  (XYZAL) 5 MG TABLET    Take 1 tablet (5 mg total) by mouth every evening.    PRAMIPEXOLE (MIRAPEX) 0.5 MG TABLET    Nightly by mouth prn

## 2017-05-10 ENCOUNTER — PATIENT MESSAGE (OUTPATIENT)
Dept: INTERNAL MEDICINE | Facility: CLINIC | Age: 67
End: 2017-05-10

## 2017-07-03 ENCOUNTER — OFFICE VISIT (OUTPATIENT)
Dept: PSYCHIATRY | Facility: CLINIC | Age: 67
End: 2017-07-03
Payer: MEDICARE

## 2017-07-03 VITALS
BODY MASS INDEX: 35.51 KG/M2 | HEART RATE: 81 BPM | WEIGHT: 193 LBS | SYSTOLIC BLOOD PRESSURE: 135 MMHG | DIASTOLIC BLOOD PRESSURE: 90 MMHG | HEIGHT: 62 IN

## 2017-07-03 DIAGNOSIS — G25.81 RESTLESS LEGS SYNDROME: ICD-10-CM

## 2017-07-03 DIAGNOSIS — F33.0 MDD (MAJOR DEPRESSIVE DISORDER), RECURRENT EPISODE, MILD: Primary | ICD-10-CM

## 2017-07-03 DIAGNOSIS — F09 MILD COGNITIVE DISORDER: ICD-10-CM

## 2017-07-03 DIAGNOSIS — F41.9 ANXIETY DISORDER, UNSPECIFIED TYPE: ICD-10-CM

## 2017-07-03 PROCEDURE — 1159F MED LIST DOCD IN RCRD: CPT | Mod: S$GLB,,, | Performed by: PSYCHIATRY & NEUROLOGY

## 2017-07-03 PROCEDURE — 99999 PR PBB SHADOW E&M-EST. PATIENT-LVL II: CPT | Mod: PBBFAC,,, | Performed by: PSYCHIATRY & NEUROLOGY

## 2017-07-03 PROCEDURE — 99214 OFFICE O/P EST MOD 30 MIN: CPT | Mod: S$GLB,,, | Performed by: PSYCHIATRY & NEUROLOGY

## 2017-07-03 RX ORDER — PRAMIPEXOLE DIHYDROCHLORIDE 0.25 MG/1
0.25 TABLET ORAL NIGHTLY
Qty: 30 TABLET | Refills: 3 | Status: SHIPPED | OUTPATIENT
Start: 2017-07-03 | End: 2018-04-25

## 2017-07-03 RX ORDER — PRAMIPEXOLE DIHYDROCHLORIDE 0.5 MG/1
0.25 TABLET ORAL NIGHTLY
COMMUNITY
End: 2017-07-03 | Stop reason: SDUPTHER
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
## 2017-07-04 PROBLEM — F41.9 ANXIETY DISORDER: Status: ACTIVE | Noted: 2017-07-04

## 2017-07-04 PROBLEM — F09 MILD COGNITIVE DISORDER: Status: ACTIVE | Noted: 2017-07-04

## 2017-07-21 ENCOUNTER — TELEPHONE (OUTPATIENT)
Dept: NEUROLOGY | Facility: CLINIC | Age: 67
End: 2017-07-21

## 2017-07-21 ENCOUNTER — PATIENT MESSAGE (OUTPATIENT)
Dept: INTERNAL MEDICINE | Facility: CLINIC | Age: 67
End: 2017-07-21

## 2017-07-21 NOTE — TELEPHONE ENCOUNTER
----- Message from Lonnie Barnard sent at 7/19/2017 12:12 PM CDT -----  Contact: Self [phone]  Patient is calling to schedule a NP appt with Dr. Bloom to consult for restless leg syndrome, pls call

## 2017-07-21 NOTE — TELEPHONE ENCOUNTER
----- Message from Marielena Robin sent at 7/21/2017  2:11 PM CDT -----  Contact: self @ [phone]  Pt says she is returning Janie's call concerning an appt with Dr Bloom.  pls call.

## 2017-07-25 ENCOUNTER — PATIENT MESSAGE (OUTPATIENT)
Dept: INTERNAL MEDICINE | Facility: CLINIC | Age: 67
End: 2017-07-25

## 2017-07-26 ENCOUNTER — PATIENT MESSAGE (OUTPATIENT)
Dept: INTERNAL MEDICINE | Facility: CLINIC | Age: 67
End: 2017-07-26

## 2017-07-26 RX ORDER — LEVOTHYROXINE SODIUM 100 UG/1
100 TABLET ORAL
Qty: 90 TABLET | Refills: 3 | Status: SHIPPED | OUTPATIENT
Start: 2017-07-26 | End: 2018-03-05 | Stop reason: SDUPTHER

## 2017-08-07 ENCOUNTER — TELEPHONE (OUTPATIENT)
Dept: NEUROLOGY | Facility: CLINIC | Age: 67
End: 2017-08-07

## 2017-08-16 ENCOUNTER — PATIENT MESSAGE (OUTPATIENT)
Dept: INTERNAL MEDICINE | Facility: CLINIC | Age: 67
End: 2017-08-16

## 2017-08-16 ENCOUNTER — OFFICE VISIT (OUTPATIENT)
Dept: NEUROLOGY | Facility: CLINIC | Age: 67
End: 2017-08-16
Payer: MEDICARE

## 2017-08-16 VITALS
DIASTOLIC BLOOD PRESSURE: 88 MMHG | WEIGHT: 195.31 LBS | SYSTOLIC BLOOD PRESSURE: 134 MMHG | HEART RATE: 76 BPM | HEIGHT: 62 IN | BODY MASS INDEX: 35.94 KG/M2

## 2017-08-16 DIAGNOSIS — E03.9 PRIMARY HYPOTHYROIDISM: Primary | ICD-10-CM

## 2017-08-16 DIAGNOSIS — G25.81 RLS (RESTLESS LEGS SYNDROME): Primary | ICD-10-CM

## 2017-08-16 PROCEDURE — 99213 OFFICE O/P EST LOW 20 MIN: CPT | Mod: GC,S$GLB,, | Performed by: PSYCHIATRY & NEUROLOGY

## 2017-08-16 PROCEDURE — 99999 PR PBB SHADOW E&M-EST. PATIENT-LVL III: CPT | Mod: PBBFAC,GC,, | Performed by: PSYCHIATRY & NEUROLOGY

## 2017-08-16 RX ORDER — GABAPENTIN 100 MG/1
100 CAPSULE ORAL NIGHTLY
Qty: 30 CAPSULE | Refills: 3 | Status: SHIPPED | OUTPATIENT
Start: 2017-08-16 | End: 2017-09-05 | Stop reason: SDUPTHER

## 2017-08-16 RX ORDER — FERROUS SULFATE 325(65) MG
325 TABLET ORAL
Qty: 60 TABLET | Refills: 0 | Status: SHIPPED | OUTPATIENT
Start: 2017-08-16 | End: 2017-10-15

## 2017-08-16 NOTE — PROGRESS NOTES
Patient Name: Trinity Valenzuela  MRN: [id number]    CC:  RLS     HPI: Trinity Valenzuela is a 67 y.o. female presented to my clinic for the evaluation of RLS. She reported that since child her legs are bothering which means aching in legs B/L. After that everything resolved on its own. In 2005 she was diagnosed with arthritis but she think this leg problem is not arthritis. She reported that usually symptoms start after 4:30. Her symptoms are that she can not keep her legs still and feel constant vibration. Now its getting worse and almost all day. During night she twist and turn and unable to sleep because of uncomfortable feeling. She takes trazodone for sleep and this medication helps. If she dont take trazodone she can not sleep due to her leg symptoms. She is prescribed with Mirapex 0.25 mg HS, but she is taking 0.37 mg HS. Initially she was taking 0.5 mg HS which was helping but she started having tremors. Her PCP decreased to 0.25 mg HS which is not helping. She also tried Requip but start gambling as a side effect and had to stop. Her PCP is not prescribing 0.5 mg and she has to see neurologist for this problem. Her ferritin was low and recent one is 27 which is again on the lower side. She is very frustrated and need help with her leg movements. Denies HA, dizziness, vision changes, tremors, or LOC.     ROS:   Constitutional: Negative for malaise/fatigue. Negative for weight loss.   HENT: Negative for hearing loss.   Eyes: Negative for blurred vision and double vision.   Respiratory: Negative for shortness of breath and stridor.   Cardiovascular: Negative for chest pain and palpitations.   Gastrointestinal: Negative for nausea, vomiting and constipation.   Genitourinary: Negative for frequency. Negative for urgency.   Musculoskeletal: Negative for joint pain. Negative for myalgias and falls.   Skin: Negative for rash.   Neurological: Negative for dizziness and tremors. Negative for focal weakness and seizures.    Endo/Heme/Allergies: Does not bruise/bleed easily.   Psychiatric/Behavioral: Negative for depression and hallucinations. The patient is not nervous/anxious.    Past Medical History  Past Medical History:   Diagnosis Date    Depression     GERD (gastroesophageal reflux disease)     Hypertension     Thyroid disease        Medications    Current Outpatient Prescriptions:     aspirin (ECOTRIN) 81 MG EC tablet, Take 1 tablet (81 mg total) by mouth once daily. (Patient taking differently: Take 81 mg by mouth once a week. ), Disp: 100 tablet, Rfl: 3    citalopram (CELEXA) 40 MG tablet, Take 1 tablet (40 mg total) by mouth once daily., Disp: 90 tablet, Rfl: 2    donepezil (ARICEPT) 5 MG tablet, Take 1 tablet (5 mg total) by mouth every evening., Disp: 90 tablet, Rfl: 4    ketoconazole (NIZORAL) 2 % shampoo, Apply topically twice a week., Disp: 120 mL, Rfl: 4    levothyroxine (SYNTHROID) 100 MCG tablet, Take 1 tablet (100 mcg total) by mouth before breakfast. The patient wants to purchase the brand name Synthroid, Disp: 90 tablet, Rfl: 3    losartan-hydrochlorothiazide 50-12.5 mg (HYZAAR) 50-12.5 mg per tablet, Take 1 tablet by mouth every morning., Disp: 90 tablet, Rfl: 3    multivitamin (THERAGRAN) tablet, Take 1 tablet by mouth once daily., Disp: , Rfl:     pantoprazole (PROTONIX) 20 MG tablet, Take 1 tablet (20 mg total) by mouth every morning., Disp: 90 tablet, Rfl: 3    pramipexole (MIRAPEX) 0.25 MG tablet, Take 1 tablet (0.25 mg total) by mouth nightly., Disp: 30 tablet, Rfl: 3    trazodone (DESYREL) 50 MG tablet, Take 1 tablet (50 mg total) by mouth every evening., Disp: 90 tablet, Rfl: 3    Allergies  Review of patient's allergies indicates:  No Known Allergies    Social History  Social History     Social History    Marital status:      Spouse name: N/A    Number of children: N/A    Years of education: N/A     Occupational History    Not on file.     Social History Main Topics    Smoking  "status: Never Smoker    Smokeless tobacco: Not on file    Alcohol use No    Drug use: No    Sexual activity: Not on file     Other Topics Concern    Not on file     Social History Narrative    2014.    The patient became  25 years ago.  Her   at the age of 42 from lung cancer.  He had been an .  They had 4 children and she had the pleasure of being a full-time mother during thier early childhood.     Following her 's death she returned to school and got a college degree in nursing.  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        In 2012 she slipped, fell and had a closed head injury resulting in a traumatic brain injury with impaired memory function and difficulty with focus.  It was very difficult to figure out what was going on with her cognitive function.  When she reached the age of 64, her employer, [employer] forced her to retire.          She is a good friend of [employer] and an avid .    She feels ready for [employer] but her 4 children think that she is depressed and that she needs to work.         She is working with a psychiatrist. She does not drink or smoke.        Her eldest daughter is now 44 years old and her youngest child is her only son who is 33 yeas old.       Family History  No family history on file.    Physical Exam  /88   Pulse 76   Ht 5' 2" (1.575 m)   Wt 88.6 kg (195 lb 5.2 oz)   BMI 35.73 kg/m²     General appearance: Well-developed, well-groomed.     Neurologic Exam: The patient is awake, alert and oriented. Language is fluent. Recent and remote memory are normal. Attention span and concentration are normal. Fund of knowledge is appropriate.     Cranial nerves: pupils are round and reactive to light and accommodation. Visual " fields are full to confrontation. Fundoscopic exam reveals sharp discs bilaterally, with good venous pulsations. Ocular motility is full in all cardinal positions of gaze. Facial sensation is normal to pinprick and light touch. Corneal reflexes are present bilaterally. Facial activation is symmetric. Hearing is normal bilaterally. Palate elevates symmetrically and gag reflex is intact bilaterally. Shoulder elevation is symmetric and full strength bilaterally. Tongue is midline and neck rotation strength is normal bilaterally. Neck range of motion is normal.     Motor examination of all extremities demonstrates normal bulk and tone in all four limbs. There are no atrophy or fasciculations. Strength is 5/5 in the upper and lower extremities bilaterally without pronator drift.     Sensory examination is normal to pinprick, vibration and proprioception in the upper and lower extremities bilaterally. Romberg is negative.    Deep tendon reflexes are 2+ and symmetric in the upper and lower extremities bilaterally. Toes are mute bilaterally.     Gait: Normal heel, toe, tandem, and casual gait.    Coordination: Finger to nose and heel to shin testing is normal in both upper and lower extremities. Rapid alternating movements are normal in both upper and lower extremities.     General exam  Cardiovascular: regular rate and rhythm with no murmurs, rubs or gallops. There are no carotid or vertebral artery bruits. Pulses in both upper and lower extremities are symmetric. There is no peripheral edema.   Head and neck: no cervical lymphadenopathy    Lab and Test Results    WBC   Date Value Ref Range Status   07/24/2017 5.34 3.90 - 12.70 K/uL Final   03/21/2017 4.55 3.90 - 12.70 K/uL Final   11/22/2016 6.95 3.90 - 12.70 K/uL Final     Hemoglobin   Date Value Ref Range Status   07/24/2017 13.2 12.0 - 16.0 g/dL Final   03/21/2017 13.5 12.0 - 16.0 g/dL Final   11/22/2016 11.9 (L) 12.0 - 16.0 g/dL Final     Hematocrit   Date Value Ref  Range Status   07/24/2017 41.2 37.0 - 48.5 % Final   03/21/2017 41.2 37.0 - 48.5 % Final   11/22/2016 36.4 (L) 37.0 - 48.5 % Final     Platelets   Date Value Ref Range Status   07/24/2017 257 150 - 350 K/uL Final   03/21/2017 241 150 - 350 K/uL Final   11/22/2016 270 150 - 350 K/uL Final     Glucose   Date Value Ref Range Status   07/24/2017 92 70 - 110 mg/dL Final   03/21/2017 95 70 - 110 mg/dL Final   08/25/2016 89 70 - 110 mg/dL Final     Sodium   Date Value Ref Range Status   07/24/2017 140 136 - 145 mmol/L Final   03/21/2017 144 136 - 145 mmol/L Final   08/25/2016 144 136 - 145 mmol/L Final     Potassium   Date Value Ref Range Status   07/24/2017 3.6 3.5 - 5.1 mmol/L Final   03/21/2017 3.8 3.5 - 5.1 mmol/L Final   08/25/2016 4.4 3.5 - 5.1 mmol/L Final     Chloride   Date Value Ref Range Status   07/24/2017 103 95 - 110 mmol/L Final   03/21/2017 105 95 - 110 mmol/L Final   08/25/2016 104 95 - 110 mmol/L Final     CO2   Date Value Ref Range Status   07/24/2017 25 23 - 29 mmol/L Final   03/21/2017 29 23 - 29 mmol/L Final   08/25/2016 24 23 - 29 mmol/L Final     BUN, Bld   Date Value Ref Range Status   07/24/2017 18 (H) 7 - 17 mg/dL Final   03/21/2017 16 7 - 17 mg/dL Final   08/25/2016 15 7 - 17 mg/dL Final     Creatinine   Date Value Ref Range Status   07/24/2017 0.81 0.50 - 1.40 mg/dL Final   03/21/2017 0.81 0.50 - 1.40 mg/dL Final   08/25/2016 0.90 0.50 - 1.40 mg/dL Final     Calcium   Date Value Ref Range Status   07/24/2017 9.1 8.7 - 10.5 mg/dL Final   03/21/2017 9.0 8.7 - 10.5 mg/dL Final   08/25/2016 8.5 (L) 8.7 - 10.5 mg/dL Final     Magnesium   Date Value Ref Range Status   03/26/2015 1.8 1.6 - 2.6 mg/dL Final     Alkaline Phosphatase   Date Value Ref Range Status   07/24/2017 55 38 - 126 U/L Final   03/21/2017 60 38 - 126 U/L Final   08/25/2016 58 38 - 126 U/L Final     ALT   Date Value Ref Range Status   07/24/2017 41 10 - 44 U/L Final   03/21/2017 34 10 - 44 U/L Final   08/25/2016 22 10 - 44 U/L Final      AST   Date Value Ref Range Status   07/24/2017 32 15 - 46 U/L Final   03/21/2017 36 15 - 46 U/L Final   08/25/2016 25 15 - 46 U/L Final         Images:   No recent imaging     Assessment and Plan    Trinity Valenzuela is a 67 y.o. female presented to my clinic for the evaluation of RLS. Her ferritin is on lower side. Likely contributing to her symptoms.     -- Continue Mirapex 0.25 mg HS  -- Will start Gabapentin 100 mg HS for 1 week, increase to 200 mg HS for 1 week and finally 300 mg HS.    -- Started on Ferrous sulfate 325 mg daily   -- Case discussed with Dr Abraham   -- Follow up on September 12th at 1 PM.       Manohar Peña MD  Neurology Resident   [facility] [address] [place] [address]  Pager: [phone]

## 2017-09-01 ENCOUNTER — PATIENT MESSAGE (OUTPATIENT)
Dept: NEUROLOGY | Facility: CLINIC | Age: 67
End: 2017-09-01

## 2017-09-05 RX ORDER — GABAPENTIN 100 MG/1
300 CAPSULE ORAL NIGHTLY
Qty: 30 CAPSULE | Refills: 4 | Status: SHIPPED | OUTPATIENT
Start: 2017-09-05 | End: 2017-09-29

## 2017-09-12 ENCOUNTER — OFFICE VISIT (OUTPATIENT)
Dept: NEUROLOGY | Facility: CLINIC | Age: 67
End: 2017-09-12
Payer: MEDICARE

## 2017-09-12 VITALS
DIASTOLIC BLOOD PRESSURE: 89 MMHG | BODY MASS INDEX: 36.79 KG/M2 | HEART RATE: 70 BPM | WEIGHT: 199.94 LBS | HEIGHT: 62 IN | SYSTOLIC BLOOD PRESSURE: 138 MMHG

## 2017-09-12 DIAGNOSIS — G25.81 RLS (RESTLESS LEGS SYNDROME): Primary | ICD-10-CM

## 2017-09-12 PROCEDURE — 1126F AMNT PAIN NOTED NONE PRSNT: CPT | Mod: GC,S$GLB,, | Performed by: PSYCHIATRY & NEUROLOGY

## 2017-09-12 PROCEDURE — 1159F MED LIST DOCD IN RCRD: CPT | Mod: GC,S$GLB,, | Performed by: PSYCHIATRY & NEUROLOGY

## 2017-09-12 PROCEDURE — 3008F BODY MASS INDEX DOCD: CPT | Mod: GC,S$GLB,, | Performed by: PSYCHIATRY & NEUROLOGY

## 2017-09-12 PROCEDURE — 99213 OFFICE O/P EST LOW 20 MIN: CPT | Mod: GC,S$GLB,, | Performed by: PSYCHIATRY & NEUROLOGY

## 2017-09-12 PROCEDURE — 99999 PR PBB SHADOW E&M-EST. PATIENT-LVL III: CPT | Mod: PBBFAC,GC,, | Performed by: PSYCHIATRY & NEUROLOGY

## 2017-09-12 PROCEDURE — 3075F SYST BP GE 130 - 139MM HG: CPT | Mod: GC,S$GLB,, | Performed by: PSYCHIATRY & NEUROLOGY

## 2017-09-12 PROCEDURE — 3079F DIAST BP 80-89 MM HG: CPT | Mod: GC,S$GLB,, | Performed by: PSYCHIATRY & NEUROLOGY

## 2017-09-12 NOTE — PROGRESS NOTES
Patient Name: Trinity Valenzuela  MRN: [id number]    CC:  RLS     Interval History:(9/12/2017)  Much better. Per patient 90% improved in RLS symptoms. No other acute issues reported. Denies HA, dizziness, vision changes, tremors, or LOC.       HPI: Trinity Valenzuela is a 67 y.o. female presented to my clinic for the evaluation of RLS. She reported that since child her legs are bothering which means aching in legs B/L. After that everything resolved on its own. In 2005 she was diagnosed with arthritis but she think this leg problem is not arthritis. She reported that usually symptoms start after 4:30. Her symptoms are that she can not keep her legs still and feel constant vibration. Now its getting worse and almost all day. During night she twist and turn and unable to sleep because of uncomfortable feeling. She takes trazodone for sleep and this medication helps. If she dont take trazodone she can not sleep due to her leg symptoms. She is prescribed with Mirapex 0.25 mg HS, but she is taking 0.37 mg HS. Initially she was taking 0.5 mg HS which was helping but she started having tremors. Her PCP decreased to 0.25 mg HS which is not helping. She also tried Requip but start gambling as a side effect and had to stop. Her PCP is not prescribing 0.5 mg and she has to see neurologist for this problem. Her ferritin was low and recent one is 27 which is again on the lower side. She is very frustrated and need help with her leg movements. Denies HA, dizziness, vision changes, tremors, or LOC.     ROS:   Constitutional: Negative for malaise/fatigue. Negative for weight loss.   HENT: Negative for hearing loss.   Eyes: Negative for blurred vision and double vision.   Respiratory: Negative for shortness of breath and stridor.   Cardiovascular: Negative for chest pain and palpitations.   Gastrointestinal: Negative for nausea, vomiting and constipation.   Genitourinary: Negative for frequency. Negative for urgency.   Musculoskeletal:  Negative for joint pain. Negative for myalgias and falls.   Skin: Negative for rash.   Neurological: Negative for dizziness and tremors. Negative for focal weakness and seizures.   Endo/Heme/Allergies: Does not bruise/bleed easily.   Psychiatric/Behavioral: Negative for depression and hallucinations. The patient is not nervous/anxious.    Past Medical History  Past Medical History:   Diagnosis Date    Depression     GERD (gastroesophageal reflux disease)     Hypertension     Thyroid disease        Medications    Current Outpatient Prescriptions:     aspirin (ECOTRIN) 81 MG EC tablet, Take 1 tablet (81 mg total) by mouth once daily. (Patient taking differently: Take 81 mg by mouth once a week. ), Disp: 100 tablet, Rfl: 3    citalopram (CELEXA) 40 MG tablet, Take 1 tablet (40 mg total) by mouth once daily., Disp: 90 tablet, Rfl: 2    donepezil (ARICEPT) 5 MG tablet, Take 1 tablet (5 mg total) by mouth every evening., Disp: 90 tablet, Rfl: 4    ferrous sulfate 325 mg (65 mg iron) Tab tablet, Take 1 tablet (325 mg total) by mouth daily with breakfast., Disp: 60 tablet, Rfl: 0    gabapentin (NEURONTIN) 100 MG capsule, Take 3 capsules (300 mg total) by mouth every evening., Disp: 30 capsule, Rfl: 4    ketoconazole (NIZORAL) 2 % shampoo, Apply topically twice a week., Disp: 120 mL, Rfl: 4    levothyroxine (SYNTHROID) 100 MCG tablet, Take 1 tablet (100 mcg total) by mouth before breakfast. The patient wants to purchase the brand name Synthroid, Disp: 90 tablet, Rfl: 3    losartan-hydrochlorothiazide 50-12.5 mg (HYZAAR) 50-12.5 mg per tablet, Take 1 tablet by mouth every morning., Disp: 90 tablet, Rfl: 3    multivitamin (THERAGRAN) tablet, Take 1 tablet by mouth once daily., Disp: , Rfl:     pantoprazole (PROTONIX) 20 MG tablet, Take 1 tablet (20 mg total) by mouth every morning., Disp: 90 tablet, Rfl: 3    pramipexole (MIRAPEX) 0.25 MG tablet, Take 1 tablet (0.25 mg total) by mouth nightly., Disp: 30 tablet,  Rfl: 3    trazodone (DESYREL) 50 MG tablet, Take 1 tablet (50 mg total) by mouth every evening., Disp: 90 tablet, Rfl: 3    Allergies  Review of patient's allergies indicates:  No Known Allergies    Social History  Social History     Social History    Marital status:      Spouse name: N/A    Number of children: N/A    Years of education: N/A     Occupational History    Not on file.     Social History Main Topics    Smoking status: Never Smoker    Smokeless tobacco: Not on file    Alcohol use No    Drug use: No    Sexual activity: Not on file     Other Topics Concern    Not on file     Social History Narrative    2014.    The patient became  25 years ago.  Her   at the age of 42 from lung cancer.  He had been an .  They had 4 children and she had the pleasure of being a full-time mother during thier early childhood.     Following her 's death she returned to school and got a college degree in nursing.  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        In 2012 she slipped, fell and had a closed head injury resulting in a traumatic brain injury with impaired memory function and difficulty with focus.  It was very difficult to figure out what was going on with her cognitive function.  When she reached the age of 64, her employer, [employer] forced her to retire.          She is a good friend of [employer] and an avid .    She feels ready for [employer] but her 4 children think that she is depressed and that she needs to work.         She is working with a psychiatrist. She does not drink or smoke.        Her eldest daughter is now 44 years old and her youngest child is her only son who is 33 yeas old.       Family History  No family history on file.    Physical  "Exam  /89   Pulse 70   Ht 5' 2" (1.575 m)   Wt 90.7 kg (199 lb 15.3 oz)   BMI 36.57 kg/m²     General appearance: Well-developed, well-groomed.     Neurologic Exam: The patient is awake, alert and oriented. Language is fluent. Recent and remote memory are normal. Attention span and concentration are normal. Fund of knowledge is appropriate.     Cranial nerves: pupils are round and reactive to light and accommodation. Visual fields are full to confrontation. Fundoscopic exam reveals sharp discs bilaterally, with good venous pulsations. Ocular motility is full in all cardinal positions of gaze. Facial sensation is normal to pinprick and light touch. Corneal reflexes are present bilaterally. Facial activation is symmetric. Hearing is normal bilaterally. Palate elevates symmetrically and gag reflex is intact bilaterally. Shoulder elevation is symmetric and full strength bilaterally. Tongue is midline and neck rotation strength is normal bilaterally. Neck range of motion is normal.     Motor examination of all extremities demonstrates normal bulk and tone in all four limbs. There are no atrophy or fasciculations. Strength is 5/5 in the upper and lower extremities bilaterally without pronator drift.     Sensory examination is normal to pinprick, vibration and proprioception in the upper and lower extremities bilaterally. Romberg is negative.    Deep tendon reflexes are 2+ and symmetric in the upper and lower extremities bilaterally. Toes are mute bilaterally.     Gait: Normal heel, toe, tandem, and casual gait.    Coordination: Finger to nose and heel to shin testing is normal in both upper and lower extremities. Rapid alternating movements are normal in both upper and lower extremities.     General exam  Cardiovascular: regular rate and rhythm with no murmurs, rubs or gallops. There are no carotid or vertebral artery bruits. Pulses in both upper and lower extremities are symmetric. There is no peripheral edema. "   Head and neck: no cervical lymphadenopathy    Lab and Test Results    WBC   Date Value Ref Range Status   07/24/2017 5.34 3.90 - 12.70 K/uL Final   03/21/2017 4.55 3.90 - 12.70 K/uL Final   11/22/2016 6.95 3.90 - 12.70 K/uL Final     Hemoglobin   Date Value Ref Range Status   07/24/2017 13.2 12.0 - 16.0 g/dL Final   03/21/2017 13.5 12.0 - 16.0 g/dL Final   11/22/2016 11.9 (L) 12.0 - 16.0 g/dL Final     Hematocrit   Date Value Ref Range Status   07/24/2017 41.2 37.0 - 48.5 % Final   03/21/2017 41.2 37.0 - 48.5 % Final   11/22/2016 36.4 (L) 37.0 - 48.5 % Final     Platelets   Date Value Ref Range Status   07/24/2017 257 150 - 350 K/uL Final   03/21/2017 241 150 - 350 K/uL Final   11/22/2016 270 150 - 350 K/uL Final     Glucose   Date Value Ref Range Status   07/24/2017 92 70 - 110 mg/dL Final   03/21/2017 95 70 - 110 mg/dL Final   08/25/2016 89 70 - 110 mg/dL Final     Sodium   Date Value Ref Range Status   07/24/2017 140 136 - 145 mmol/L Final   03/21/2017 144 136 - 145 mmol/L Final   08/25/2016 144 136 - 145 mmol/L Final     Potassium   Date Value Ref Range Status   07/24/2017 3.6 3.5 - 5.1 mmol/L Final   03/21/2017 3.8 3.5 - 5.1 mmol/L Final   08/25/2016 4.4 3.5 - 5.1 mmol/L Final     Chloride   Date Value Ref Range Status   07/24/2017 103 95 - 110 mmol/L Final   03/21/2017 105 95 - 110 mmol/L Final   08/25/2016 104 95 - 110 mmol/L Final     CO2   Date Value Ref Range Status   07/24/2017 25 23 - 29 mmol/L Final   03/21/2017 29 23 - 29 mmol/L Final   08/25/2016 24 23 - 29 mmol/L Final     BUN, Bld   Date Value Ref Range Status   07/24/2017 18 (H) 7 - 17 mg/dL Final   03/21/2017 16 7 - 17 mg/dL Final   08/25/2016 15 7 - 17 mg/dL Final     Creatinine   Date Value Ref Range Status   07/24/2017 0.81 0.50 - 1.40 mg/dL Final   03/21/2017 0.81 0.50 - 1.40 mg/dL Final   08/25/2016 0.90 0.50 - 1.40 mg/dL Final     Calcium   Date Value Ref Range Status   07/24/2017 9.1 8.7 - 10.5 mg/dL Final   03/21/2017 9.0 8.7 - 10.5  mg/dL Final   08/25/2016 8.5 (L) 8.7 - 10.5 mg/dL Final     Magnesium   Date Value Ref Range Status   03/26/2015 1.8 1.6 - 2.6 mg/dL Final     Alkaline Phosphatase   Date Value Ref Range Status   07/24/2017 55 38 - 126 U/L Final   03/21/2017 60 38 - 126 U/L Final   08/25/2016 58 38 - 126 U/L Final     ALT   Date Value Ref Range Status   07/24/2017 41 10 - 44 U/L Final   03/21/2017 34 10 - 44 U/L Final   08/25/2016 22 10 - 44 U/L Final     AST   Date Value Ref Range Status   07/24/2017 32 15 - 46 U/L Final   03/21/2017 36 15 - 46 U/L Final   08/25/2016 25 15 - 46 U/L Final         Images:   No recent imaging     Assessment and Plan    Trinity Valenzuela is a 67 y.o. female presented to my clinic for the evaluation of RLS. Her ferritin is on lower side. Today 90% improved.      -- Continue Mirapex 0.25 mg HS  -- Continue Gabapentin 300 mg HS.  AEs reviewed.   -- Continue Ferrous sulfate 325 mg daily   -- Case discussed with Dr Whelan    -- Follow up with me on January 9th at 3:30 PM.       Manohar Peña MD  Neurology Resident   [facility] [address] [place] [address]  Pager: [phone]

## 2017-09-15 ENCOUNTER — TELEPHONE (OUTPATIENT)
Dept: NEUROLOGY | Facility: CLINIC | Age: 67
End: 2017-09-15

## 2017-09-15 ENCOUNTER — PATIENT MESSAGE (OUTPATIENT)
Dept: NEUROLOGY | Facility: CLINIC | Age: 67
End: 2017-09-15

## 2017-09-15 NOTE — TELEPHONE ENCOUNTER
Original dispense quantity was incorrect. Confirmed with Aziza at [facility] that dispense amount should be 90 tablets (1 month supply @ 3 tablets daily). She confirmed and adjusted the dose accordingly.

## 2017-09-15 NOTE — TELEPHONE ENCOUNTER
----- Message from Lance Infante sent at 9/15/2017 11:43 AM CDT -----  Contact: [facility]  Pharmacy would like a call back in ref to pt rx    Pharmacy needs clarification on rx     Can be reached at [phone]

## 2017-09-20 ENCOUNTER — PATIENT MESSAGE (OUTPATIENT)
Dept: NEUROLOGY | Facility: CLINIC | Age: 67
End: 2017-09-20

## 2017-09-24 NOTE — TELEPHONE ENCOUNTER
Called patient.   Increase Gabapentin from 300 HS to 400 HS for 2 days, then 500 HS for 2 days and finally 600 HS.       Manohar Peña MD  Neurology Resident   [facility] [address] [place] [address]  Pager: [phone]

## 2017-09-28 ENCOUNTER — PATIENT MESSAGE (OUTPATIENT)
Dept: NEUROLOGY | Facility: CLINIC | Age: 67
End: 2017-09-28

## 2017-09-29 ENCOUNTER — PATIENT MESSAGE (OUTPATIENT)
Dept: NEUROLOGY | Facility: CLINIC | Age: 67
End: 2017-09-29

## 2017-09-29 RX ORDER — GABAPENTIN 600 MG/1
600 TABLET ORAL NIGHTLY
Qty: 30 TABLET | Refills: 3 | Status: SHIPPED | OUTPATIENT
Start: 2017-09-29 | End: 2018-02-02 | Stop reason: SDUPTHER

## 2017-12-27 NOTE — PROGRESS NOTES
Attestation:  I have personally taken the history and examined this patient and concur with the resident's note as stated above.   Assessment, and plan was made by me after evaluation of all available information .    Devora Abraham MD, EMMA(), [facility].  Neurology-Epilepsy.

## 2018-01-09 ENCOUNTER — OFFICE VISIT (OUTPATIENT)
Dept: NEUROLOGY | Facility: CLINIC | Age: 68
End: 2018-01-09
Payer: MEDICARE

## 2018-01-09 VITALS
DIASTOLIC BLOOD PRESSURE: 84 MMHG | HEART RATE: 82 BPM | HEIGHT: 62 IN | SYSTOLIC BLOOD PRESSURE: 119 MMHG | WEIGHT: 196.88 LBS | BODY MASS INDEX: 36.23 KG/M2

## 2018-01-09 DIAGNOSIS — G25.81 RLS (RESTLESS LEGS SYNDROME): Primary | ICD-10-CM

## 2018-01-09 PROCEDURE — 99999 PR PBB SHADOW E&M-EST. PATIENT-LVL III: CPT | Mod: PBBFAC,GC,, | Performed by: PSYCHIATRY & NEUROLOGY

## 2018-01-09 PROCEDURE — 99213 OFFICE O/P EST LOW 20 MIN: CPT | Mod: GC,S$GLB,, | Performed by: PSYCHIATRY & NEUROLOGY

## 2018-01-09 NOTE — PATIENT INSTRUCTIONS
-- Off Mirapex   -- Decrease Gabapentin from 1000 mg to 600 mg HS.  AEs reviewed.   -- Continue Ferrous sulfate 325 mg daily

## 2018-01-09 NOTE — PROGRESS NOTES
Patient Name: Trinity Valenzuela  MRN: [id number]    CC:  RLS     Interval History:(1/9/2018)  RLS symptoms greatly improved. She is complaining of sleepiness. I prescribed Gabapentin 600 mg HS but she taking 1000 mg HS. She is sleepy due to Gabapentin.        Interval History:(9/12/2017)  Much better. Per patient 90% improved in RLS symptoms. No other acute issues reported. Denies HA, dizziness, vision changes, tremors, or LOC.       HPI: Trinity Valenzuela is a 67 y.o. female presented to my clinic for the evaluation of RLS. She reported that since child her legs are bothering which means aching in legs B/L. After that everything resolved on its own. In 2005 she was diagnosed with arthritis but she think this leg problem is not arthritis. She reported that usually symptoms start after 4:30. Her symptoms are that she can not keep her legs still and feel constant vibration. Now its getting worse and almost all day. During night she twist and turn and unable to sleep because of uncomfortable feeling. She takes trazodone for sleep and this medication helps. If she dont take trazodone she can not sleep due to her leg symptoms. She is prescribed with Mirapex 0.25 mg HS, but she is taking 0.37 mg HS. Initially she was taking 0.5 mg HS which was helping but she started having tremors. Her PCP decreased to 0.25 mg HS which is not helping. She also tried Requip but start gambling as a side effect and had to stop. Her PCP is not prescribing 0.5 mg and she has to see neurologist for this problem. Her ferritin was low and recent one is 27 which is again on the lower side. She is very frustrated and need help with her leg movements. Denies HA, dizziness, vision changes, tremors, or LOC.     ROS:   Constitutional: Negative for malaise/fatigue. Negative for weight loss.   HENT: Negative for hearing loss.   Eyes: Negative for blurred vision and double vision.   Respiratory: Negative for shortness of breath and stridor.   Cardiovascular:  Negative for chest pain and palpitations.   Gastrointestinal: Negative for nausea, vomiting and constipation.   Genitourinary: Negative for frequency. Negative for urgency.   Musculoskeletal: Negative for joint pain. Negative for myalgias and falls.   Skin: Negative for rash.   Neurological: Negative for dizziness and tremors. Negative for focal weakness and seizures.   Endo/Heme/Allergies: Does not bruise/bleed easily.   Psychiatric/Behavioral: Negative for depression and hallucinations. The patient is not nervous/anxious.    Past Medical History  Past Medical History:   Diagnosis Date    Depression     GERD (gastroesophageal reflux disease)     Hypertension     Thyroid disease        Medications    Current Outpatient Prescriptions:     aspirin (ECOTRIN) 81 MG EC tablet, Take 1 tablet (81 mg total) by mouth once daily. (Patient taking differently: Take 81 mg by mouth once a week. ), Disp: 100 tablet, Rfl: 3    citalopram (CELEXA) 40 MG tablet, Take 1 tablet (40 mg total) by mouth once daily., Disp: 90 tablet, Rfl: 2    donepezil (ARICEPT) 5 MG tablet, Take 1 tablet (5 mg total) by mouth every evening., Disp: 90 tablet, Rfl: 4    gabapentin (NEURONTIN) 600 MG tablet, Take 1 tablet (600 mg total) by mouth every evening., Disp: 30 tablet, Rfl: 3    ketoconazole (NIZORAL) 2 % shampoo, Apply topically twice a week., Disp: 120 mL, Rfl: 4    levothyroxine (SYNTHROID) 100 MCG tablet, Take 1 tablet (100 mcg total) by mouth before breakfast. The patient wants to purchase the brand name Synthroid, Disp: 90 tablet, Rfl: 3    losartan-hydrochlorothiazide 50-12.5 mg (HYZAAR) 50-12.5 mg per tablet, Take 1 tablet by mouth every morning., Disp: 90 tablet, Rfl: 3    multivitamin (THERAGRAN) tablet, Take 1 tablet by mouth once daily., Disp: , Rfl:     pantoprazole (PROTONIX) 20 MG tablet, Take 1 tablet (20 mg total) by mouth every morning., Disp: 90 tablet, Rfl: 3    pramipexole (MIRAPEX) 0.25 MG tablet, Take 1 tablet  (0.25 mg total) by mouth nightly., Disp: 30 tablet, Rfl: 3    trazodone (DESYREL) 50 MG tablet, Take 1 tablet (50 mg total) by mouth every evening., Disp: 90 tablet, Rfl: 3    Allergies  Review of patient's allergies indicates:  No Known Allergies    Social History  Social History     Social History    Marital status:      Spouse name: N/A    Number of children: N/A    Years of education: N/A     Occupational History    Not on file.     Social History Main Topics    Smoking status: Never Smoker    Smokeless tobacco: Not on file    Alcohol use No    Drug use: No    Sexual activity: Not on file     Other Topics Concern    Not on file     Social History Narrative    2014.    The patient became  25 years ago.  Her   at the age of 42 from lung cancer.  He had been an .  They had 4 children and she had the pleasure of being a full-time mother during thier early childhood.     Following her 's death she returned to school and got a college degree in nursing.  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        In 2012 she slipped, fell and had a closed head injury resulting in a traumatic brain injury with impaired memory function and difficulty with focus.  It was very difficult to figure out what was going on with her cognitive function.  When she reached the age of 64, her employer, [employer] forced her to retire.          She is a good friend of [employer] and an avid .    She feels ready for intermediate but her 4 children think that she is depressed and that she needs to work.         She is working with a psychiatrist. She does not drink or smoke.        Her eldest daughter is now 44 years old and her youngest child is her only son who is 33 yeas old.  "      Family History  No family history on file.    Physical Exam  /84   Pulse 82   Ht 5' 2" (1.575 m)   Wt 89.3 kg (196 lb 13.9 oz)   BMI 36.01 kg/m²     General appearance: Well-developed, well-groomed.     Neurologic Exam: The patient is awake, alert and oriented. Language is fluent. Recent and remote memory are normal. Attention span and concentration are normal. Fund of knowledge is appropriate.     Cranial nerves: pupils are round and reactive to light and accommodation. Visual fields are full to confrontation. Fundoscopic exam reveals sharp discs bilaterally, with good venous pulsations. Ocular motility is full in all cardinal positions of gaze. Facial sensation is normal to pinprick and light touch. Corneal reflexes are present bilaterally. Facial activation is symmetric. Hearing is normal bilaterally. Palate elevates symmetrically and gag reflex is intact bilaterally. Shoulder elevation is symmetric and full strength bilaterally. Tongue is midline and neck rotation strength is normal bilaterally. Neck range of motion is normal.     Motor examination of all extremities demonstrates normal bulk and tone in all four limbs. There are no atrophy or fasciculations. Strength is 5/5 in the upper and lower extremities bilaterally without pronator drift.     Sensory examination is normal to pinprick, vibration and proprioception in the upper and lower extremities bilaterally. Romberg is negative.    Deep tendon reflexes are 2+ and symmetric in the upper and lower extremities bilaterally. Toes are mute bilaterally.     Gait: Normal heel, toe, tandem, and casual gait.    Coordination: Finger to nose and heel to shin testing is normal in both upper and lower extremities. Rapid alternating movements are normal in both upper and lower extremities.     General exam  Cardiovascular: regular rate and rhythm with no murmurs, rubs or gallops. There are no carotid or vertebral artery bruits. Pulses in both upper and " lower extremities are symmetric. There is no peripheral edema.   Head and neck: no cervical lymphadenopathy    Lab and Test Results    WBC   Date Value Ref Range Status   07/24/2017 5.34 3.90 - 12.70 K/uL Final   03/21/2017 4.55 3.90 - 12.70 K/uL Final   11/22/2016 6.95 3.90 - 12.70 K/uL Final     Hemoglobin   Date Value Ref Range Status   07/24/2017 13.2 12.0 - 16.0 g/dL Final   03/21/2017 13.5 12.0 - 16.0 g/dL Final   11/22/2016 11.9 (L) 12.0 - 16.0 g/dL Final     Hematocrit   Date Value Ref Range Status   07/24/2017 41.2 37.0 - 48.5 % Final   03/21/2017 41.2 37.0 - 48.5 % Final   11/22/2016 36.4 (L) 37.0 - 48.5 % Final     Platelets   Date Value Ref Range Status   07/24/2017 257 150 - 350 K/uL Final   03/21/2017 241 150 - 350 K/uL Final   11/22/2016 270 150 - 350 K/uL Final     Glucose   Date Value Ref Range Status   07/24/2017 92 70 - 110 mg/dL Final   03/21/2017 95 70 - 110 mg/dL Final   08/25/2016 89 70 - 110 mg/dL Final     Sodium   Date Value Ref Range Status   07/24/2017 140 136 - 145 mmol/L Final   03/21/2017 144 136 - 145 mmol/L Final   08/25/2016 144 136 - 145 mmol/L Final     Potassium   Date Value Ref Range Status   07/24/2017 3.6 3.5 - 5.1 mmol/L Final   03/21/2017 3.8 3.5 - 5.1 mmol/L Final   08/25/2016 4.4 3.5 - 5.1 mmol/L Final     Chloride   Date Value Ref Range Status   07/24/2017 103 95 - 110 mmol/L Final   03/21/2017 105 95 - 110 mmol/L Final   08/25/2016 104 95 - 110 mmol/L Final     CO2   Date Value Ref Range Status   07/24/2017 25 23 - 29 mmol/L Final   03/21/2017 29 23 - 29 mmol/L Final   08/25/2016 24 23 - 29 mmol/L Final     BUN, Bld   Date Value Ref Range Status   07/24/2017 18 (H) 7 - 17 mg/dL Final   03/21/2017 16 7 - 17 mg/dL Final   08/25/2016 15 7 - 17 mg/dL Final     Creatinine   Date Value Ref Range Status   07/24/2017 0.81 0.50 - 1.40 mg/dL Final   03/21/2017 0.81 0.50 - 1.40 mg/dL Final   08/25/2016 0.90 0.50 - 1.40 mg/dL Final     Calcium   Date Value Ref Range Status    07/24/2017 9.1 8.7 - 10.5 mg/dL Final   03/21/2017 9.0 8.7 - 10.5 mg/dL Final   08/25/2016 8.5 (L) 8.7 - 10.5 mg/dL Final     Magnesium   Date Value Ref Range Status   03/26/2015 1.8 1.6 - 2.6 mg/dL Final     Alkaline Phosphatase   Date Value Ref Range Status   07/24/2017 55 38 - 126 U/L Final   03/21/2017 60 38 - 126 U/L Final   08/25/2016 58 38 - 126 U/L Final     ALT   Date Value Ref Range Status   07/24/2017 41 10 - 44 U/L Final   03/21/2017 34 10 - 44 U/L Final   08/25/2016 22 10 - 44 U/L Final     AST   Date Value Ref Range Status   07/24/2017 32 15 - 46 U/L Final   03/21/2017 36 15 - 46 U/L Final   08/25/2016 25 15 - 46 U/L Final       Images:   No recent imaging     Assessment and Plan    Trinity Valenzuela is a 67 y.o. female presented to my clinic for the evaluation of RLS. Her ferritin is on lower side. After increasing Gabapentin symptoms greatly improved.       -- Off Mirapex   -- Decrease Gabapentin from 1000 mg to 600 mg HS.  AEs reviewed.   -- Continue Ferrous sulfate 325 mg daily   -- Case discussed with Dr Whelan    -- Follow up with me in June (Movement disorder clinic)       Manohar Peña MD  Neurology Resident   [facility] [address] [place] [address]  Pager: [phone]

## 2018-01-19 ENCOUNTER — PATIENT MESSAGE (OUTPATIENT)
Dept: NEUROLOGY | Facility: CLINIC | Age: 68
End: 2018-01-19

## 2018-01-26 ENCOUNTER — TELEPHONE (OUTPATIENT)
Dept: INTERNAL MEDICINE | Facility: CLINIC | Age: 68
End: 2018-01-26

## 2018-01-26 RX ORDER — PANTOPRAZOLE SODIUM 20 MG/1
20 TABLET, DELAYED RELEASE ORAL EVERY MORNING
Qty: 90 TABLET | Refills: 3 | Status: SHIPPED | OUTPATIENT
Start: 2018-01-26 | End: 2018-03-29 | Stop reason: SDUPTHER

## 2018-01-26 NOTE — TELEPHONE ENCOUNTER
----- Message from Flavia Carrizales sent at 1/26/2018  3:05 PM CST -----  Contact: [facility] [phone]  Rx Name: pantoprazole (PROTONIX) 20 MG tablet    Refill: yes    Pharmacy: [facility]    Comments: requesting 40 mg. Please fax to [phone]    Please call and advise.    Thank You

## 2018-02-02 RX ORDER — GABAPENTIN 600 MG/1
TABLET ORAL
Qty: 30 TABLET | Refills: 3 | Status: SHIPPED | OUTPATIENT
Start: 2018-02-02 | End: 2018-08-16

## 2018-03-02 RX ORDER — CITALOPRAM 40 MG/1
40 TABLET, FILM COATED ORAL DAILY
Qty: 90 TABLET | Refills: 0 | Status: SHIPPED | OUTPATIENT
Start: 2018-03-02 | End: 2018-03-29 | Stop reason: CLARIF

## 2018-03-05 RX ORDER — LEVOTHYROXINE SODIUM 100 UG/1
100 TABLET ORAL
Qty: 90 TABLET | Refills: 3 | Status: SHIPPED | OUTPATIENT
Start: 2018-03-05 | End: 2018-03-29 | Stop reason: SDUPTHER

## 2018-03-05 NOTE — TELEPHONE ENCOUNTER
----- Message from Rossana Brizuela sent at 3/5/2018  1:42 PM CST -----  Contact: Negrita/ [facility] [phone]  Prescription Request:     Name of medication: levothyroxine (SYNTHROID) 100 MCG tablet    Reason for request: Refill    Pharmacy: [facility] [id number] [address]    Please advise.    Thank You

## 2018-03-20 ENCOUNTER — PATIENT MESSAGE (OUTPATIENT)
Dept: INTERNAL MEDICINE | Facility: CLINIC | Age: 68
End: 2018-03-20

## 2018-03-22 ENCOUNTER — TELEPHONE (OUTPATIENT)
Dept: INTERNAL MEDICINE | Facility: CLINIC | Age: 68
End: 2018-03-22

## 2018-03-22 NOTE — TELEPHONE ENCOUNTER
Patient is having surgery on April 12 and is in need of a laury. Your schedule is really tight.     Please advise me on next step.    Thank you   JENNIFER Anna

## 2018-03-22 NOTE — TELEPHONE ENCOUNTER
Called patient and no answer, she is scheduled for her pre op on 3-29-18 @8am with pcp per Dr. Sanders.

## 2018-03-29 ENCOUNTER — OFFICE VISIT (OUTPATIENT)
Dept: INTERNAL MEDICINE | Facility: CLINIC | Age: 68
End: 2018-03-29
Payer: MEDICARE

## 2018-03-29 VITALS
DIASTOLIC BLOOD PRESSURE: 70 MMHG | HEIGHT: 62 IN | HEART RATE: 83 BPM | OXYGEN SATURATION: 97 % | WEIGHT: 195.31 LBS | SYSTOLIC BLOOD PRESSURE: 120 MMHG | BODY MASS INDEX: 35.94 KG/M2

## 2018-03-29 DIAGNOSIS — Z12.39 BREAST CANCER SCREENING: ICD-10-CM

## 2018-03-29 DIAGNOSIS — E03.9 PRIMARY HYPOTHYROIDISM: ICD-10-CM

## 2018-03-29 DIAGNOSIS — K29.80 DUODENITIS: ICD-10-CM

## 2018-03-29 DIAGNOSIS — G25.81 RLS (RESTLESS LEGS SYNDROME): ICD-10-CM

## 2018-03-29 DIAGNOSIS — R41.3 MEMORY LOSS: ICD-10-CM

## 2018-03-29 DIAGNOSIS — Z78.0 POSTMENOPAUSAL STATUS: ICD-10-CM

## 2018-03-29 DIAGNOSIS — M81.0 OSTEOPOROSIS, UNSPECIFIED OSTEOPOROSIS TYPE, UNSPECIFIED PATHOLOGICAL FRACTURE PRESENCE: ICD-10-CM

## 2018-03-29 DIAGNOSIS — F09 MILD COGNITIVE DISORDER: ICD-10-CM

## 2018-03-29 DIAGNOSIS — Z86.19 HISTORY OF HEPATITIS C: ICD-10-CM

## 2018-03-29 DIAGNOSIS — K21.9 GASTROESOPHAGEAL REFLUX DISEASE WITHOUT ESOPHAGITIS: ICD-10-CM

## 2018-03-29 DIAGNOSIS — I10 ESSENTIAL HYPERTENSION: ICD-10-CM

## 2018-03-29 DIAGNOSIS — S06.9X9S TRAUMATIC BRAIN INJURY WITH LOSS OF CONSCIOUSNESS, SEQUELA: ICD-10-CM

## 2018-03-29 DIAGNOSIS — Z23 NEED FOR SHINGLES VACCINE: ICD-10-CM

## 2018-03-29 DIAGNOSIS — G25.0 TREMOR, ESSENTIAL: ICD-10-CM

## 2018-03-29 DIAGNOSIS — F33.41 RECURRENT MAJOR DEPRESSIVE DISORDER, IN PARTIAL REMISSION: ICD-10-CM

## 2018-03-29 DIAGNOSIS — Z79.899 HIGH RISK MEDICATION USE: ICD-10-CM

## 2018-03-29 DIAGNOSIS — Z01.818 PRE-OP EVALUATION: ICD-10-CM

## 2018-03-29 DIAGNOSIS — Z00.00 ANNUAL PHYSICAL EXAM: Primary | ICD-10-CM

## 2018-03-29 DIAGNOSIS — D50.0 IRON DEFICIENCY ANEMIA DUE TO CHRONIC BLOOD LOSS: ICD-10-CM

## 2018-03-29 PROCEDURE — 3074F SYST BP LT 130 MM HG: CPT | Mod: CPTII,S$GLB,, | Performed by: INTERNAL MEDICINE

## 2018-03-29 PROCEDURE — 3078F DIAST BP <80 MM HG: CPT | Mod: CPTII,S$GLB,, | Performed by: INTERNAL MEDICINE

## 2018-03-29 PROCEDURE — 99999 PR PBB SHADOW E&M-EST. PATIENT-LVL III: CPT | Mod: PBBFAC,,, | Performed by: INTERNAL MEDICINE

## 2018-03-29 PROCEDURE — 99397 PER PM REEVAL EST PAT 65+ YR: CPT | Mod: S$GLB,,, | Performed by: INTERNAL MEDICINE

## 2018-03-29 RX ORDER — PANTOPRAZOLE SODIUM 20 MG/1
20 TABLET, DELAYED RELEASE ORAL EVERY MORNING
Qty: 90 TABLET | Refills: 3 | Status: SHIPPED | OUTPATIENT
Start: 2018-03-29 | End: 2019-03-06 | Stop reason: SDUPTHER

## 2018-03-29 RX ORDER — LOSARTAN POTASSIUM AND HYDROCHLOROTHIAZIDE 12.5; 5 MG/1; MG/1
1 TABLET ORAL EVERY MORNING
Qty: 90 TABLET | Refills: 3 | Status: SHIPPED | OUTPATIENT
Start: 2018-03-29 | End: 2019-03-06 | Stop reason: SDUPTHER

## 2018-03-29 RX ORDER — DONEPEZIL HYDROCHLORIDE 5 MG/1
5 TABLET, FILM COATED ORAL NIGHTLY
Qty: 90 TABLET | Refills: 4 | Status: SHIPPED | OUTPATIENT
Start: 2018-03-29 | End: 2019-03-06

## 2018-03-29 RX ORDER — LEVOTHYROXINE SODIUM 100 UG/1
100 TABLET ORAL
Qty: 90 TABLET | Refills: 3 | Status: SHIPPED | OUTPATIENT
Start: 2018-03-29 | End: 2019-02-04 | Stop reason: SDUPTHER

## 2018-03-29 RX ORDER — CITALOPRAM 40 MG/1
40 TABLET, FILM COATED ORAL DAILY
Qty: 90 TABLET | Refills: 3 | Status: SHIPPED | OUTPATIENT
Start: 2018-03-29 | End: 2018-04-03 | Stop reason: SDUPTHER

## 2018-03-31 NOTE — PROGRESS NOTES
Subjective:       Patient ID: Trinity Valenzuela is a 67 y.o. female.    Chief Complaint: Pre-op Exam and Annual Exam  This note was created with the use of Dragon dictation    Wellness visit  All chronic medical problems addressed.  No new problems or complaints.  All medications reviewed.  Immunizations and screenings updated and discussed.    This visit is also a pre-op visit.    The patient has been under the care of Dr. Vladislav Huitron for many years.  She has had progressive visual impairment because of cataracts.    She has worked with her eye doctor closely and thought about this surgery over the past year.  She has finally made a decision to proceed with cataract extraction.    She is scheduled for surgery on .    She understands the risk and the benefits of this surgery and she is anxious to proceed.    Her visual impairment bothers her.  She has made arrangements for help in her home on the day of the surgery, when she goes home that day and until she is feeling confident once again living alone, on her own.    She's not having as much trouble with tremor or restless leg syndrome.  She is sleeping better.  She is taking an iron tablet every day.  Her memory is stable.  Her depression is under good control.She is grieving because her dog of 15 years  recently.      Due to Epic, it was quite a struggle to get her medications ordered and sent to the pharmacies that she desired.    HPI  Review of Systems   Constitutional: Negative for activity change, appetite change, chills, fatigue, fever and unexpected weight change.   HENT: Negative for dental problem, hearing loss, tinnitus, trouble swallowing and voice change.    Eyes: Negative for visual disturbance.   Respiratory: Negative for cough, chest tightness, shortness of breath and wheezing.    Cardiovascular: Negative for chest pain, palpitations and leg swelling.   Gastrointestinal: Negative for abdominal pain, blood in stool, constipation, diarrhea  and nausea.   Genitourinary: Negative for difficulty urinating, dysuria, frequency and urgency.   Musculoskeletal: Negative for arthralgias, back pain, gait problem, joint swelling, myalgias, neck pain and neck stiffness.   Skin: Negative for rash.   Neurological: Negative for dizziness, tremors, speech difficulty, weakness, light-headedness and headaches.   Psychiatric/Behavioral: Negative for dysphoric mood and sleep disturbance. The patient is not nervous/anxious.        Objective:      Physical Exam   Constitutional: She appears well-nourished.   HENT:   Head: Atraumatic.   Eyes: Conjunctivae are normal. No scleral icterus.   Neck: Neck supple.   Cardiovascular: Normal rate and regular rhythm.    Pulmonary/Chest: Effort normal and breath sounds normal.   Abdominal: Soft. There is no tenderness.   Musculoskeletal: She exhibits no edema.   Lymphadenopathy:     She has no cervical adenopathy.   Neurological: She is alert.   Skin: Skin is warm and dry.   Psychiatric: She has a normal mood and affect. Her behavior is normal.   Nursing note and vitals reviewed.      Assessment:       1. Annual physical exam    2. Recurrent major depressive disorder, in partial remission    3. Traumatic brain injury with loss of consciousness, sequela    4. Tremor, essential    5. RLS (restless legs syndrome)    6. Primary hypothyroidism    7. Postmenopausal status    8. Osteoporosis, unspecified osteoporosis type, unspecified pathological fracture presence    9. Need for shingles vaccine    10. Duodenitis, EGD 12-    11. Gastroesophageal reflux disease without esophagitis    12. History of hepatitis C, treated, no virus detectable 2015    13. Essential hypertension    14. Iron deficiency anemia due to chronic blood loss    15. Memory loss    16. Mild cognitive disorder    17. Pre-op evaluation    18. Breast cancer screening    19. High risk medication use, PPI        Plan:   Trinity was seen today for pre-op exam and annual  exam.    Diagnoses and all orders for this visit:    Annual physical exam    Recurrent major depressive disorder, in partial remission    Traumatic brain injury with loss of consciousness, sequela    Tremor, essential  -     Ferritin; Future  -     Magnesium; Future    RLS (restless legs syndrome)  -     Ferritin; Future  -     Magnesium; Future    Primary hypothyroidism  -     TSH; Future    Postmenopausal status    Osteoporosis, unspecified osteoporosis type, unspecified pathological fracture presence    Need for shingles vaccine    Duodenitis, EGD 12-. ASA 81 mg discontinued. Risks exceed any potential benefit.-     Comprehensive metabolic panel; Future    Gastroesophageal reflux disease without esophagitis  -     Comprehensive metabolic panel; Future    History of hepatitis C, treated, no virus detectable 2015  -     Comprehensive metabolic panel; Future    Essential hypertension  -     Comprehensive metabolic panel; Future  -     Lipid panel; Future    Iron deficiency anemia due to chronic blood loss  -     TSH; Future  -     CBC auto differential; Future  -     Comprehensive metabolic panel; Future  -     Ferritin; Future    Memory loss    Mild cognitive disorder    Pre-op evaluation.  All of the patient's chronic medical problems are optimized.  She fully understands the risk and benefits of the proposed surgery and she is cleared to proceed.  This note along with her other medical information will be faxed to Dr. Vladislav Quick    Breast cancer screening  -     Mammo Digital Screening Bilat with CAD; Future    High risk medication use, PPI  -     Ferritin; Future  -     Magnesium; Future    Other orders  -     donepezil (ARICEPT) 5 MG tablet; Take 1 tablet (5 mg total) by mouth every evening.  -     levothyroxine (SYNTHROID) 100 MCG tablet; Take 1 tablet (100 mcg total) by mouth before breakfast. The patient wants to purchase the brand name Synthroid  -     losartan-hydrochlorothiazide 50-12.5 mg (HYZAAR)  50-12.5 mg per tablet; Take 1 tablet by mouth every morning.  -     pantoprazole (PROTONIX) 20 MG tablet; Take 1 tablet (20 mg total) by mouth every morning.  -     citalopram (CELEXA) 40 MG tablet; Take 1 tablet (40 mg total) by mouth once daily.    Medication List with Changes/Refills   New Medications    CITALOPRAM (CELEXA) 40 MG TABLET    Take 1 tablet (40 mg total) by mouth once daily.   Current Medications    GABAPENTIN (NEURONTIN) 600 MG TABLET    TAKE ONE TABLET BY MOUTH IN THE EVENING    KETOCONAZOLE (NIZORAL) 2 % SHAMPOO    Apply topically twice a week.    MULTIVITAMIN (THERAGRAN) TABLET    Take 1 tablet by mouth once daily.    PRAMIPEXOLE (MIRAPEX) 0.25 MG TABLET    Take 1 tablet (0.25 mg total) by mouth nightly.   Changed and/or Refilled Medications    Modified Medication Previous Medication    DONEPEZIL (ARICEPT) 5 MG TABLET donepezil (ARICEPT) 5 MG tablet       Take 1 tablet (5 mg total) by mouth every evening.    Take 1 tablet (5 mg total) by mouth every evening.    LEVOTHYROXINE (SYNTHROID) 100 MCG TABLET levothyroxine (SYNTHROID) 100 MCG tablet       Take 1 tablet (100 mcg total) by mouth before breakfast. The patient wants to purchase the brand name Synthroid    Take 1 tablet (100 mcg total) by mouth before breakfast. The patient wants to purchase the brand name Synthroid    LOSARTAN-HYDROCHLOROTHIAZIDE 50-12.5 MG (HYZAAR) 50-12.5 MG PER TABLET losartan-hydrochlorothiazide 50-12.5 mg (HYZAAR) 50-12.5 mg per tablet       Take 1 tablet by mouth every morning.    Take 1 tablet by mouth every morning.    PANTOPRAZOLE (PROTONIX) 20 MG TABLET pantoprazole (PROTONIX) 20 MG tablet       Take 1 tablet (20 mg total) by mouth every morning.    Take 1 tablet (20 mg total) by mouth every morning.   Discontinued Medications    ASPIRIN (ECOTRIN) 81 MG EC TABLET    Take 1 tablet (81 mg total) by mouth once daily.    CITALOPRAM (CELEXA) 40 MG TABLET    Take 1 tablet (40 mg total) by mouth once daily.    TRAZODONE  (DESYREL) 50 MG TABLET    Take 1 tablet (50 mg total) by mouth every evening.     Follow-up in about 6 months (around 9/29/2018).

## 2018-04-02 ENCOUNTER — PATIENT MESSAGE (OUTPATIENT)
Dept: NEUROLOGY | Facility: CLINIC | Age: 68
End: 2018-04-02

## 2018-04-02 ENCOUNTER — TELEPHONE (OUTPATIENT)
Dept: INTERNAL MEDICINE | Facility: CLINIC | Age: 68
End: 2018-04-02

## 2018-04-03 ENCOUNTER — PATIENT MESSAGE (OUTPATIENT)
Dept: INTERNAL MEDICINE | Facility: CLINIC | Age: 68
End: 2018-04-03

## 2018-04-03 DIAGNOSIS — F33.41 RECURRENT MAJOR DEPRESSIVE DISORDER, IN PARTIAL REMISSION: Primary | ICD-10-CM

## 2018-04-03 RX ORDER — CITALOPRAM 20 MG/1
20 TABLET, FILM COATED ORAL DAILY
Qty: 90 TABLET | Refills: 0 | Status: SHIPPED | OUTPATIENT
Start: 2018-04-03 | End: 2018-04-09 | Stop reason: SDUPTHER

## 2018-04-05 ENCOUNTER — PATIENT MESSAGE (OUTPATIENT)
Dept: NEUROLOGY | Facility: CLINIC | Age: 68
End: 2018-04-05

## 2018-04-05 NOTE — TELEPHONE ENCOUNTER
Can you call in this prescription? Advised the patient that Dr. Villela is on vacation and will back next week.

## 2018-04-09 RX ORDER — CITALOPRAM 20 MG/1
20 TABLET, FILM COATED ORAL DAILY
Qty: 90 TABLET | Refills: 0 | Status: SHIPPED | OUTPATIENT
Start: 2018-04-09 | End: 2018-07-02 | Stop reason: SDUPTHER

## 2018-04-23 ENCOUNTER — PATIENT MESSAGE (OUTPATIENT)
Dept: NEUROLOGY | Facility: CLINIC | Age: 68
End: 2018-04-23

## 2018-04-23 DIAGNOSIS — G25.81 RLS (RESTLESS LEGS SYNDROME): Primary | ICD-10-CM

## 2018-04-25 RX ORDER — ROPINIROLE 2 MG/1
2 TABLET, FILM COATED ORAL NIGHTLY
Qty: 30 TABLET | Refills: 11 | Status: SHIPPED | OUTPATIENT
Start: 2018-04-25 | End: 2018-08-16

## 2018-04-26 ENCOUNTER — PATIENT MESSAGE (OUTPATIENT)
Dept: NEUROLOGY | Facility: CLINIC | Age: 68
End: 2018-04-26

## 2018-06-03 ENCOUNTER — PATIENT MESSAGE (OUTPATIENT)
Dept: INTERNAL MEDICINE | Facility: CLINIC | Age: 68
End: 2018-06-03

## 2018-06-03 DIAGNOSIS — E53.8 VITAMIN B 12 DEFICIENCY: ICD-10-CM

## 2018-06-04 PROBLEM — E53.8 VITAMIN B 12 DEFICIENCY: Status: ACTIVE | Noted: 2018-06-04

## 2018-06-04 NOTE — TELEPHONE ENCOUNTER
Trinity Sanders MD 14 hours ago (9:01 PM)         I have discovered that I have had high folic acid levels . It was found by a new neurologist I went to in the [facility] group. Dr Riggs.  I believe it is due to a vitamin b12 deficiency. It was so bad when I was a child I lost huge patches of hair. Iwasgiven liquid B12 to take at the time. I think I dont absorb that vitamin from food. It is something like an allergy. Anyway everything I take medication for gloria result of B12 deficiency. Look up effects of that. I cant believe it. My legs are so much better since Itake the iron I hardly need anything.         Please advise  Thank you  JENNIFER Anna

## 2018-06-12 ENCOUNTER — PATIENT MESSAGE (OUTPATIENT)
Dept: INTERNAL MEDICINE | Facility: CLINIC | Age: 68
End: 2018-06-12

## 2018-06-12 NOTE — TELEPHONE ENCOUNTER
MD Tommy 43 minutes ago (3:49 PM)         I just found out that my grandson had blood test done and he has had mononucleosis. My question is should I get tested or does that just run its course? I have been unexplainably tired and find it hard to make myself go. I did go back to see the neurologist just as a return to see how medication is working and they gave me a copy of labs she had done. Everything looks good except folate. It is 47. She is referring me to a Hemotologist.She did check B12 level and she said it was alright, on the low side.         Please advise   Thank you

## 2018-07-02 DIAGNOSIS — F33.41 RECURRENT MAJOR DEPRESSIVE DISORDER, IN PARTIAL REMISSION: ICD-10-CM

## 2018-07-02 RX ORDER — CITALOPRAM 20 MG/1
TABLET, FILM COATED ORAL
Qty: 30 TABLET | Refills: 0 | Status: SHIPPED | OUTPATIENT
Start: 2018-07-02 | End: 2018-08-06 | Stop reason: SDUPTHER

## 2018-08-06 DIAGNOSIS — F33.41 RECURRENT MAJOR DEPRESSIVE DISORDER, IN PARTIAL REMISSION: ICD-10-CM

## 2018-08-06 RX ORDER — CITALOPRAM 20 MG/1
TABLET, FILM COATED ORAL
Qty: 90 TABLET | Refills: 1 | Status: SHIPPED | OUTPATIENT
Start: 2018-08-06 | End: 2018-08-16 | Stop reason: SDUPTHER

## 2018-08-16 ENCOUNTER — OFFICE VISIT (OUTPATIENT)
Dept: INTERNAL MEDICINE | Facility: CLINIC | Age: 68
End: 2018-08-16
Payer: MEDICARE

## 2018-08-16 VITALS
WEIGHT: 183.88 LBS | HEIGHT: 62 IN | OXYGEN SATURATION: 96 % | SYSTOLIC BLOOD PRESSURE: 120 MMHG | DIASTOLIC BLOOD PRESSURE: 74 MMHG | HEART RATE: 78 BPM | BODY MASS INDEX: 33.84 KG/M2

## 2018-08-16 DIAGNOSIS — F33.41 RECURRENT MAJOR DEPRESSIVE DISORDER, IN PARTIAL REMISSION: Primary | ICD-10-CM

## 2018-08-16 DIAGNOSIS — J32.9 CHRONIC SINUSITIS, UNSPECIFIED LOCATION: ICD-10-CM

## 2018-08-16 PROCEDURE — 99214 OFFICE O/P EST MOD 30 MIN: CPT | Mod: S$GLB,,, | Performed by: INTERNAL MEDICINE

## 2018-08-16 PROCEDURE — 3074F SYST BP LT 130 MM HG: CPT | Mod: CPTII,S$GLB,, | Performed by: INTERNAL MEDICINE

## 2018-08-16 PROCEDURE — 3078F DIAST BP <80 MM HG: CPT | Mod: CPTII,S$GLB,, | Performed by: INTERNAL MEDICINE

## 2018-08-16 PROCEDURE — 99999 PR PBB SHADOW E&M-EST. PATIENT-LVL III: CPT | Mod: PBBFAC,,, | Performed by: INTERNAL MEDICINE

## 2018-08-16 RX ORDER — LEVOCETIRIZINE DIHYDROCHLORIDE 5 MG/1
5 TABLET, FILM COATED ORAL NIGHTLY
Qty: 90 TABLET | Refills: 3 | Status: SHIPPED | OUTPATIENT
Start: 2018-08-16 | End: 2019-03-06 | Stop reason: SDUPTHER

## 2018-08-16 RX ORDER — CITALOPRAM 40 MG/1
40 TABLET, FILM COATED ORAL DAILY
Qty: 90 TABLET | Refills: 3 | Status: SHIPPED | OUTPATIENT
Start: 2018-08-16 | End: 2019-03-06 | Stop reason: SDUPTHER

## 2018-08-16 NOTE — PATIENT INSTRUCTIONS
Health Maintenance       Date Due Completion Date    Influenza Vaccine 08/01/2018 3/29/2018 (Done)    Override on 3/29/2018: Done    Mammogram 08/30/2018 8/30/2016    DEXA SCAN 03/21/2020 3/21/2017    Lipid Panel 08/13/2023 8/13/2018    TETANUS VACCINE 11/04/2024 11/4/2014    Colonoscopy 11/16/2026 11/16/2016 (Done)    Override on 11/16/2016: Done        Schedule your Mammogram

## 2018-08-18 NOTE — PROGRESS NOTES
Subjective:       Patient ID: Trinity Valenzuela is a 68 y.o. female.    Chief Complaint: Follow-up (depression f/u 5 month follow up)   She is here for medication management  She has been on antidepressant medication for many years and feels that she needs an adjustment she felt better when she was taking citalopram 40 mg once daily and did not have any side effects  HPI  Review of Systems   Constitutional: Positive for activity change. Negative for unexpected weight change.   HENT: Positive for rhinorrhea. Negative for hearing loss and trouble swallowing.    Eyes: Negative for discharge and visual disturbance.   Respiratory: Negative for chest tightness and wheezing.    Cardiovascular: Negative for chest pain and palpitations.   Gastrointestinal: Positive for diarrhea. Negative for blood in stool, constipation and vomiting.   Endocrine: Negative for polydipsia and polyuria.   Genitourinary: Negative for difficulty urinating, dysuria, hematuria and menstrual problem.   Musculoskeletal: Negative for arthralgias, joint swelling and neck pain.   Neurological: Negative for weakness and headaches.   Psychiatric/Behavioral: Positive for dysphoric mood.       Objective:      Physical Exam   Constitutional: She is oriented to person, place, and time. She appears well-developed and well-nourished. No distress.   HENT:   Head: Normocephalic and atraumatic.   Right Ear: External ear normal.   Left Ear: External ear normal.   Nose: Nose normal.   Mouth/Throat: Oropharynx is clear and moist. No oropharyngeal exudate.   Eyes: Conjunctivae and EOM are normal. Pupils are equal, round, and reactive to light. Right eye exhibits no discharge. No scleral icterus.   Neck: Normal range of motion and full passive range of motion without pain. Neck supple. No spinous process tenderness and no muscular tenderness present. Carotid bruit is not present. No thyromegaly present.   Cardiovascular: Normal rate, regular rhythm, S1 normal, S2  normal, normal heart sounds and intact distal pulses. Exam reveals no gallop and no friction rub.   No murmur heard.  Pulmonary/Chest: Effort normal and breath sounds normal. No respiratory distress. She has no wheezes. She has no rales. She exhibits no tenderness.   Abdominal: Soft. Bowel sounds are normal. She exhibits no distension and no mass. There is no tenderness. There is no rebound and no guarding.   Genitourinary: Pelvic exam was performed with patient supine. Uterus is not deviated, not enlarged, not fixed and not tender. Cervix exhibits no motion tenderness, no discharge and no friability. Right adnexum displays no mass, no tenderness and no fullness. Left adnexum displays no mass, no tenderness and no fullness.   Musculoskeletal: Normal range of motion. She exhibits no edema or tenderness.   Lymphadenopathy:        Head (right side): No submental and no submandibular adenopathy present.        Head (left side): No submental and no submandibular adenopathy present.     She has no cervical adenopathy.        Right cervical: No superficial cervical, no deep cervical and no posterior cervical adenopathy present.       Left cervical: No superficial cervical, no deep cervical and no posterior cervical adenopathy present.        Right axillary: No pectoral and no lateral adenopathy present.        Left axillary: No pectoral and no lateral adenopathy present.       Right: No supraclavicular adenopathy present.        Left: No supraclavicular adenopathy present.   Neurological: She is alert and oriented to person, place, and time. She has normal reflexes. No cranial nerve deficit. She exhibits normal muscle tone. Coordination normal.   Skin: Skin is warm and dry. No rash noted.   Psychiatric: She has a normal mood and affect. Her behavior is normal. Her mood appears not anxious. Her speech is not rapid and/or pressured. She is not agitated. She does not exhibit a depressed mood.   Normal behavior, thought  content, insight and judgement.       Assessment:       1. Recurrent major depressive disorder, in partial remission    2. Chronic sinusitis, unspecified location        Plan:   Trinity was seen today for follow-up.    Diagnoses and all orders for this visit:    Recurrent major depressive disorder, in partial remission  -     citalopram (CELEXA) 40 MG tablet; Take 1 tablet (40 mg total) by mouth once daily.    Chronic sinusitis, unspecified location    Other orders  -     levocetirizine (XYZAL) 5 MG tablet; Take 1 tablet (5 mg total) by mouth every evening.      Medication List with Changes/Refills   New Medications    LEVOCETIRIZINE (XYZAL) 5 MG TABLET    Take 1 tablet (5 mg total) by mouth every evening.   Current Medications    DONEPEZIL (ARICEPT) 5 MG TABLET    Take 1 tablet (5 mg total) by mouth every evening.    LEVOTHYROXINE (SYNTHROID) 100 MCG TABLET    Take 1 tablet (100 mcg total) by mouth before breakfast. The patient wants to purchase the brand name Synthroid    LOSARTAN-HYDROCHLOROTHIAZIDE 50-12.5 MG (HYZAAR) 50-12.5 MG PER TABLET    Take 1 tablet by mouth every morning.    PANTOPRAZOLE (PROTONIX) 20 MG TABLET    Take 1 tablet (20 mg total) by mouth every morning.   Changed and/or Refilled Medications    Modified Medication Previous Medication    CITALOPRAM (CELEXA) 40 MG TABLET citalopram (CELEXA) 20 MG tablet       Take 1 tablet (40 mg total) by mouth once daily.    TAKE ONE TABLET BY MOUTH ONCE A DAY   Discontinued Medications    GABAPENTIN (NEURONTIN) 600 MG TABLET    TAKE ONE TABLET BY MOUTH IN THE EVENING    KETOCONAZOLE (NIZORAL) 2 % SHAMPOO    Apply topically twice a week.    MULTIVITAMIN (THERAGRAN) TABLET    Take 1 tablet by mouth once daily.    ROPINIROLE (REQUIP) 2 MG TABLET    Take 1 tablet (2 mg total) by mouth every evening.     Results for orders placed or performed in visit on 08/13/18   TSH   Result Value Ref Range    TSH 1.410 0.400 - 4.000 uIU/mL   CBC auto differential   Result  Value Ref Range    WBC 4.08 3.90 - 12.70 K/uL    RBC 4.88 4.00 - 5.40 M/uL    Hemoglobin 14.8 12.0 - 16.0 g/dL    Hematocrit 44.9 37.0 - 48.5 %    MCV 92 82 - 98 fL    MCH 30.3 27.0 - 31.0 pg    MCHC 33.0 32.0 - 36.0 g/dL    RDW 13.8 11.5 - 14.5 %    Platelets 257 150 - 350 K/uL    MPV 11.1 9.2 - 12.9 fL    Gran # (ANC) 2.3 1.8 - 7.7 K/uL    Lymph # 1.4 1.0 - 4.8 K/uL    Mono # 0.4 0.3 - 1.0 K/uL    Eos # 0.1 0.0 - 0.5 K/uL    Baso # 0.00 0.00 - 0.20 K/uL    Gran% 55.4 38.0 - 73.0 %    Lymph% 33.3 18.0 - 48.0 %    Mono% 9.6 4.0 - 15.0 %    Eosinophil% 1.7 0.0 - 8.0 %    Basophil% 0.0 0.0 - 1.9 %    Differential Method Automated    Comprehensive metabolic panel   Result Value Ref Range    Sodium 141 136 - 145 mmol/L    Potassium 5.2 (H) 3.5 - 5.1 mmol/L    Chloride 104 95 - 110 mmol/L    CO2 28 23 - 29 mmol/L    Glucose 113 (H) 70 - 110 mg/dL    BUN, Bld 12 7 - 17 mg/dL    Creatinine 0.81 0.50 - 1.40 mg/dL    Calcium 9.5 8.7 - 10.5 mg/dL    Total Protein 8.0 6.0 - 8.4 g/dL    Albumin 4.5 3.5 - 5.2 g/dL    Total Bilirubin 0.9 0.1 - 1.0 mg/dL    Alkaline Phosphatase 57 38 - 126 U/L    AST 40 15 - 46 U/L    ALT 30 10 - 44 U/L    Anion Gap 9 8 - 16 mmol/L    eGFR if African American >60.0 >60 mL/min/1.73 m^2    eGFR if non African American >60.0 >60 mL/min/1.73 m^2   Lipid panel   Result Value Ref Range    Cholesterol 199 120 - 199 mg/dL    Triglycerides 160 (H) 30 - 150 mg/dL    HDL 55 40 - 75 mg/dL    LDL Cholesterol 112.0 63.0 - 159.0 mg/dL    HDL/Chol Ratio 27.6 20.0 - 50.0 %    Total Cholesterol/HDL Ratio 3.6 2.0 - 5.0    Non-HDL Cholesterol 144 mg/dL   Ferritin   Result Value Ref Range    Ferritin 83 20.0 - 300.0 ng/mL   Magnesium   Result Value Ref Range    Magnesium 1.7 1.6 - 2.6 mg/dL     Follow-up in about 6 months (around 2/16/2019).

## 2018-08-21 RX ORDER — FERROUS SULFATE 325(65) MG
325 TABLET ORAL EVERY OTHER DAY
Qty: 45 TABLET | COMMUNITY
Start: 2018-08-21 | End: 2020-05-06 | Stop reason: SDUPTHER

## 2018-08-21 RX ORDER — FERROUS SULFATE 325(65) MG
TABLET ORAL
COMMUNITY
End: 2018-08-21 | Stop reason: SDUPTHER

## 2018-08-21 NOTE — TELEPHONE ENCOUNTER
----- Message from Rossana Brizuela sent at 8/21/2018  3:49 PM CDT -----  Contact: [facility]  Prescription Request:     Name of medication: ferrous sulfate 325 mg (65 mg iron) Tab tablet    Reason for request: Refill    Pharmacy: [facility] [place] [id number] [address]    Please advise.    Thank You

## 2019-02-02 ENCOUNTER — PATIENT MESSAGE (OUTPATIENT)
Dept: INTERNAL MEDICINE | Facility: CLINIC | Age: 69
End: 2019-02-02

## 2019-02-04 NOTE — TELEPHONE ENCOUNTER
----- Message -----     From: Trniity Valenzuela     Sent: 2/2/2019 10:21 AM CST       To: Jesika Quevedo MD  Subject: Prescription    I am very concerned because I cannot find my Synthroid. I recieved it in the mail last week but I didnt move it to my bedsidebecause I still had some in old bottle. I took the last one uesyetday morning. I went to the cabinet that I keep the new bottles and its not there. I have searched everywhere even the trash and its nowhere. Could you call in a new prescription to [facility] on [address]? Synthroid 100 mcg daily. I know I will have to pay $46 again but I have to have it. Sorry to be a bother.[phone]. Thank you      Please advise  Thank you

## 2019-02-05 RX ORDER — LEVOTHYROXINE SODIUM 100 UG/1
100 TABLET ORAL
Qty: 90 TABLET | Refills: 3 | Status: SHIPPED | OUTPATIENT
Start: 2019-02-05 | End: 2019-03-06 | Stop reason: ALTCHOICE

## 2019-02-07 ENCOUNTER — OFFICE VISIT (OUTPATIENT)
Dept: URGENT CARE | Facility: CLINIC | Age: 69
End: 2019-02-07
Payer: MEDICARE

## 2019-02-07 VITALS
RESPIRATION RATE: 16 BRPM | SYSTOLIC BLOOD PRESSURE: 96 MMHG | HEIGHT: 62 IN | OXYGEN SATURATION: 98 % | WEIGHT: 183 LBS | BODY MASS INDEX: 33.68 KG/M2 | DIASTOLIC BLOOD PRESSURE: 75 MMHG | TEMPERATURE: 98 F | HEART RATE: 87 BPM

## 2019-02-07 DIAGNOSIS — J06.9 VIRAL URI WITH COUGH: Primary | ICD-10-CM

## 2019-02-07 PROCEDURE — 99214 OFFICE O/P EST MOD 30 MIN: CPT | Mod: 25,S$GLB,, | Performed by: EMERGENCY MEDICINE

## 2019-02-07 PROCEDURE — 1101F PR PT FALLS ASSESS DOC 0-1 FALLS W/OUT INJ PAST YR: ICD-10-PCS | Mod: CPTII,S$GLB,, | Performed by: EMERGENCY MEDICINE

## 2019-02-07 PROCEDURE — 96372 THER/PROPH/DIAG INJ SC/IM: CPT | Mod: S$GLB,,, | Performed by: EMERGENCY MEDICINE

## 2019-02-07 PROCEDURE — 96372 PR INJECTION,THERAP/PROPH/DIAG2ST, IM OR SUBCUT: ICD-10-PCS | Mod: S$GLB,,, | Performed by: EMERGENCY MEDICINE

## 2019-02-07 PROCEDURE — 3078F PR MOST RECENT DIASTOLIC BLOOD PRESSURE < 80 MM HG: ICD-10-PCS | Mod: CPTII,S$GLB,, | Performed by: EMERGENCY MEDICINE

## 2019-02-07 PROCEDURE — 1101F PT FALLS ASSESS-DOCD LE1/YR: CPT | Mod: CPTII,S$GLB,, | Performed by: EMERGENCY MEDICINE

## 2019-02-07 PROCEDURE — 99214 PR OFFICE/OUTPT VISIT, EST, LEVL IV, 30-39 MIN: ICD-10-PCS | Mod: 25,S$GLB,, | Performed by: EMERGENCY MEDICINE

## 2019-02-07 PROCEDURE — 3074F SYST BP LT 130 MM HG: CPT | Mod: CPTII,S$GLB,, | Performed by: EMERGENCY MEDICINE

## 2019-02-07 PROCEDURE — 71046 XR CHEST PA AND LATERAL: ICD-10-PCS | Mod: FY,S$GLB,, | Performed by: RADIOLOGY

## 2019-02-07 PROCEDURE — 3074F PR MOST RECENT SYSTOLIC BLOOD PRESSURE < 130 MM HG: ICD-10-PCS | Mod: CPTII,S$GLB,, | Performed by: EMERGENCY MEDICINE

## 2019-02-07 PROCEDURE — 71046 X-RAY EXAM CHEST 2 VIEWS: CPT | Mod: FY,S$GLB,, | Performed by: RADIOLOGY

## 2019-02-07 PROCEDURE — 3078F DIAST BP <80 MM HG: CPT | Mod: CPTII,S$GLB,, | Performed by: EMERGENCY MEDICINE

## 2019-02-07 RX ORDER — BETAMETHASONE SODIUM PHOSPHATE AND BETAMETHASONE ACETATE 3; 3 MG/ML; MG/ML
3 INJECTION, SUSPENSION INTRA-ARTICULAR; INTRALESIONAL; INTRAMUSCULAR; SOFT TISSUE
Status: COMPLETED | OUTPATIENT
Start: 2019-02-07 | End: 2019-02-07

## 2019-02-07 RX ADMIN — BETAMETHASONE SODIUM PHOSPHATE AND BETAMETHASONE ACETATE 3 MG: 3; 3 INJECTION, SUSPENSION INTRA-ARTICULAR; INTRALESIONAL; INTRAMUSCULAR; SOFT TISSUE at 04:02

## 2019-02-07 NOTE — PROGRESS NOTES
"Subjective:       Patient ID: Trinity Valenzuela is a 68 y.o. female.    Vitals:  height is 5' 2" (1.575 m) and weight is 83 kg (183 lb). Her oral temperature is 97.7 °F (36.5 °C). Her blood pressure is 96/75 and her pulse is 87. Her respiration is 16 and oxygen saturation is 98%.     Chief Complaint: Sinus Problem    Over 1 week hx non prod cough, nml sinus drainage, no LE edema, no fever/sore throat, NOC.  OK with codeine cough med.      Sinus Problem   This is a new problem. Episode onset: 10 days. The problem has been gradually worsening since onset. There has been no fever. Her pain is at a severity of 0/10. She is experiencing no pain. Associated symptoms include coughing. Pertinent negatives include no chills, congestion, diaphoresis, ear pain, shortness of breath, sinus pressure or sore throat. Treatments tried: sinus medication. The treatment provided no relief.       Constitution: Positive for fatigue. Negative for chills, sweating and fever.   HENT: Positive for voice change. Negative for ear pain, congestion, sinus pain, sinus pressure and sore throat.    Neck: Negative for painful lymph nodes.   Eyes: Negative for eye redness.   Respiratory: Positive for cough. Negative for chest tightness, sputum production, bloody sputum, COPD, shortness of breath, stridor, wheezing and asthma.    Gastrointestinal: Negative for nausea and vomiting.   Musculoskeletal: Positive for back pain. Negative for muscle ache.   Skin: Negative for rash.   Allergic/Immunologic: Negative for seasonal allergies and asthma.   Hematologic/Lymphatic: Negative for swollen lymph nodes.       Objective:      Physical Exam   Constitutional: She is oriented to person, place, and time.   Occas non prod cough   HENT:   Head: Normocephalic and atraumatic.   Right Ear: Tympanic membrane, external ear and ear canal normal.   Left Ear: Tympanic membrane, external ear and ear canal normal.   Nose: Rhinorrhea present. No mucosal edema. Right sinus " exhibits no maxillary sinus tenderness and no frontal sinus tenderness. Left sinus exhibits no maxillary sinus tenderness and no frontal sinus tenderness.   Mouth/Throat: Uvula is midline, oropharynx is clear and moist and mucous membranes are normal.   Neck: Normal range of motion. Neck supple.   Cardiovascular: Normal rate, regular rhythm and normal heart sounds.   Pulmonary/Chest:   Faint bibasilar coarse BS posteriorly   Musculoskeletal: Normal range of motion.   Lymphadenopathy:     She has no cervical adenopathy.   Neurological: She is alert and oriented to person, place, and time.   Skin: Skin is warm and dry.   Psychiatric: She has a normal mood and affect. Her behavior is normal.       Assessment:       1. Viral URI with cough        Plan:         Viral URI with cough  -     X-Ray Chest PA And Lateral; Future; Expected date: 02/07/2019  -     betamethasone acetate-betamethasone sodium phosphate injection 3 mg        Robel Stewart MD  Go to the Emergency Department for any problems  Call your PCP for follow up next available.

## 2019-02-07 NOTE — PATIENT INSTRUCTIONS
Robel Stewart MD  Go to the Emergency Department for any problems  Call your PCP for follow up next available.    Viral Upper Respiratory Illness (Adult)  You have a viral upper respiratory illness (URI), which is another term for the common cold. This illness is contagious during the first few days. It is spread through the air by coughing and sneezing. It may also be spread by direct contact (touching the sick person and then touching your own eyes, nose, or mouth). Frequent handwashing will decrease risk of spread. Most viral illnesses go away within 7 to 10 days with rest and simple home remedies. Sometimes the illness may last for several weeks. Antibiotics will not kill a virus, and they are generally not prescribed for this condition.    Home care  · If symptoms are severe, rest at home for the first 2 to 3 days. When you resume activity, don't let yourself get too tired.  · Avoid being exposed to cigarette smoke (yours or others).  · You may use acetaminophen or ibuprofen to control pain and fever, unless another medicine was prescribed. (Note: If you have chronic liver or kidney disease, have ever had a stomach ulcer or gastrointestinal bleeding, or are taking blood-thinning medicines, talk with your healthcare provider before using these medicines.) Aspirin should never be given to anyone under 18 years of age who is ill with a viral infection or fever. It may cause severe liver or brain damage.  · Your appetite may be poor, so a light diet is fine. Avoid dehydration by drinking 6 to 8 glasses of fluids per day (water, soft drinks, juices, tea, or soup). Extra fluids will help loosen secretions in the nose and lungs.  · Over-the-counter cold medicines will not shorten the length of time youre sick, but they may be helpful for the following symptoms: cough, sore throat, and nasal and sinus congestion. (Note: Do not use decongestants if you have high blood pressure.)  Follow-up care  Follow up with your  healthcare provider, or as advised.  When to seek medical advice  Call your healthcare provider right away if any of these occur:  · Cough with lots of colored sputum (mucus)  · Severe headache; face, neck, or ear pain  · Difficulty swallowing due to throat pain  · Fever of 100.4°F (38°C)  Call 911, or get immediate medical care  Call emergency services right away if any of these occur:  · Chest pain, shortness of breath, wheezing, or difficulty breathing  · Coughing up blood  · Inability to swallow due to throat pain  Date Last Reviewed: 9/13/2015 © 2000-2017 [email or web address]. [address] [place] [id number]. All rights reserved. This information is not intended as a substitute for professional medical care. Always follow your healthcare professional's instructions.        Cough, Chronic, Uncertain Cause (Adult)    Everyone has had a cough as part of the common cold, flu, or bronchitis. This kind of cough occurs along with an achy feeling, low-grade fever, nasal and sinus congestion, and a scratchy or sore throat. This usually gets better in 2 to 3 weeks. A cough that lasts longer than 3 weeks may be due to other causes.  If your cough does not improve over the next 2 weeks, further testing may be needed. Follow up with your healthcare provider as advised. Cough suppressants may be recommended. Based on your exam today, the exact cause of your cough is not certain. Below are some common causes for persistent cough.  Smokers cough  Smokers cough doesnt go away. If you continue to smoke, it only gets worse. The cough is from irritation in the air passages. Talk to your healthcare provider about quitting. Medicines or nicotine-replacement products, like gum or the patch, may make quitting easier.  Postnasal drip  A cough that is worse at night may be due to postnasal drip. Excess mucus in the nose drains from the back of your nose to your throat. This triggers the cough reflex. Postnasal drip may  be due to a sinus infection or allergy. Common allergens include dust, tobacco smoke (both inhaled and secondhand smoke), environmental pollutants, pollen, mold, pets, cleaning agents, room deodorizers, and chemical fumes. Over-the-counter antihistamines or decongestants may be helpful for allergies. A sinus infection may requires antibiotic treatment. See your healthcare provider if symptoms continue.  Medicines  Certain prescribed medicines can cause a chronic cough in some people:  · ACE inhibitors for high blood pressure. These include benazepril, captopril, enalapril, fosinopril, lisinopril, quinapril, ramipril, and others.  · Beta-blockers for high blood pressure and other conditions. These include propranolol, atenolol, metoprolol, nadolol, and others.  Let your healthcare provider know if you are taking any of these.  Asthma  Cough may be the only sign of mild asthma. You may have tests to find out if asthma is causing your cough. You may also take asthma medicine on a trial basis.  Acid reflux (heartburn, GERD)  The esophagus is the tube that carries food from the mouth to the stomach. A valve at its lower end prevents stomach acids from flowing upward. If this valve does not work properly, acid from the stomach enters the esophagus. This may cause a burning pain in the upper abdomen or lower chest, belching, or cough. Symptoms are often worse when lying flat. Avoid eating or drinking before bedtime. Try using extra pillows to raise your upper body, or place 4-inch blocks under the head of your bed. You may try an over-the-counter antacid or an acid-blocking medicine such as famotidine, cimetidine, ranitidine, esomeprazole, lansoprazole, or omeprazole. Stronger medicines for this condition can be prescribed by your healthcare provider.  Follow-up care  Follow up with your healthcare provider, or as advised, if your cough does not improve. Further testing may be needed.  Note: If an X-ray was taken, a  specialist will review it. You will be notified of any new findings that may affect your care.  When to seek medical advice  Call your healthcare provider right away if any of these occur:  · Mild wheezing or difficulty breathing  · Fever of 100.4ºF (38ºC) or higher, or as directed by your healthcare provider  · Unexpected weight loss  · Coughing up large amounts of colored sputum  · Night sweats (sheets and pajamas get soaking wet)  Call 911, or get immediate medical care  Contact emergency services right away if any of these occur:  · Coughing up blood  · Moderate to severe trouble breathing or wheezing  Date Last Reviewed: 9/13/2015  © 9688-6409 [email or web address]. [address] [place] [address], [place] [address]. All rights reserved. This information is not intended as a substitute for professional medical care. Always follow your healthcare professional's instructions.

## 2019-02-10 ENCOUNTER — TELEPHONE (OUTPATIENT)
Dept: URGENT CARE | Facility: CLINIC | Age: 69
End: 2019-02-10

## 2019-02-10 NOTE — TELEPHONE ENCOUNTER
Callback dos 02/07/19 - I left a message for the patient to return my call.   The patient is a 10y Female complaining of abdominal pain.

## 2019-03-05 RX ORDER — LEVOTHYROXINE SODIUM 100 UG/1
TABLET ORAL
Qty: 30 TABLET | Refills: 0 | Status: CANCELLED | OUTPATIENT
Start: 2019-03-05

## 2019-03-06 ENCOUNTER — OFFICE VISIT (OUTPATIENT)
Dept: INTERNAL MEDICINE | Facility: CLINIC | Age: 69
End: 2019-03-06
Payer: MEDICARE

## 2019-03-06 ENCOUNTER — IMMUNIZATION (OUTPATIENT)
Dept: PHARMACY | Facility: CLINIC | Age: 69
End: 2019-03-06
Payer: MEDICARE

## 2019-03-06 VITALS
WEIGHT: 176.81 LBS | DIASTOLIC BLOOD PRESSURE: 76 MMHG | BODY MASS INDEX: 32.54 KG/M2 | HEART RATE: 71 BPM | SYSTOLIC BLOOD PRESSURE: 118 MMHG | OXYGEN SATURATION: 98 % | HEIGHT: 62 IN

## 2019-03-06 DIAGNOSIS — K44.9 HIATAL HERNIA: ICD-10-CM

## 2019-03-06 DIAGNOSIS — E03.9 PRIMARY HYPOTHYROIDISM: ICD-10-CM

## 2019-03-06 DIAGNOSIS — E89.0 S/P TOTAL THYROIDECTOMY: ICD-10-CM

## 2019-03-06 DIAGNOSIS — K21.9 GASTROESOPHAGEAL REFLUX DISEASE WITHOUT ESOPHAGITIS: Primary | ICD-10-CM

## 2019-03-06 DIAGNOSIS — Z86.19 HISTORY OF HEPATITIS C: ICD-10-CM

## 2019-03-06 DIAGNOSIS — F33.41 RECURRENT MAJOR DEPRESSIVE DISORDER, IN PARTIAL REMISSION: ICD-10-CM

## 2019-03-06 DIAGNOSIS — K29.80 DUODENITIS: ICD-10-CM

## 2019-03-06 DIAGNOSIS — I10 ESSENTIAL HYPERTENSION: ICD-10-CM

## 2019-03-06 DIAGNOSIS — E53.8 VITAMIN B 12 DEFICIENCY: ICD-10-CM

## 2019-03-06 PROBLEM — Z98.890 S/P TOTAL THYROIDECTOMY: Status: ACTIVE | Noted: 2019-03-06

## 2019-03-06 PROBLEM — Z90.89 S/P TOTAL THYROIDECTOMY: Status: ACTIVE | Noted: 2019-03-06

## 2019-03-06 PROCEDURE — 99214 PR OFFICE/OUTPT VISIT, EST, LEVL IV, 30-39 MIN: ICD-10-PCS | Mod: S$GLB,,, | Performed by: INTERNAL MEDICINE

## 2019-03-06 PROCEDURE — 99214 OFFICE O/P EST MOD 30 MIN: CPT | Mod: S$GLB,,, | Performed by: INTERNAL MEDICINE

## 2019-03-06 PROCEDURE — 3074F SYST BP LT 130 MM HG: CPT | Mod: CPTII,S$GLB,, | Performed by: INTERNAL MEDICINE

## 2019-03-06 PROCEDURE — 1101F PR PT FALLS ASSESS DOC 0-1 FALLS W/OUT INJ PAST YR: ICD-10-PCS | Mod: CPTII,S$GLB,, | Performed by: INTERNAL MEDICINE

## 2019-03-06 PROCEDURE — 3078F DIAST BP <80 MM HG: CPT | Mod: CPTII,S$GLB,, | Performed by: INTERNAL MEDICINE

## 2019-03-06 PROCEDURE — 3074F PR MOST RECENT SYSTOLIC BLOOD PRESSURE < 130 MM HG: ICD-10-PCS | Mod: CPTII,S$GLB,, | Performed by: INTERNAL MEDICINE

## 2019-03-06 PROCEDURE — 3078F PR MOST RECENT DIASTOLIC BLOOD PRESSURE < 80 MM HG: ICD-10-PCS | Mod: CPTII,S$GLB,, | Performed by: INTERNAL MEDICINE

## 2019-03-06 PROCEDURE — 99999 PR PBB SHADOW E&M-EST. PATIENT-LVL III: CPT | Mod: PBBFAC,,, | Performed by: INTERNAL MEDICINE

## 2019-03-06 PROCEDURE — 99999 PR PBB SHADOW E&M-EST. PATIENT-LVL III: ICD-10-PCS | Mod: PBBFAC,,, | Performed by: INTERNAL MEDICINE

## 2019-03-06 PROCEDURE — 1101F PT FALLS ASSESS-DOCD LE1/YR: CPT | Mod: CPTII,S$GLB,, | Performed by: INTERNAL MEDICINE

## 2019-03-06 RX ORDER — LOSARTAN POTASSIUM AND HYDROCHLOROTHIAZIDE 12.5; 5 MG/1; MG/1
1 TABLET ORAL EVERY MORNING
Qty: 90 TABLET | Refills: 3 | Status: SHIPPED | OUTPATIENT
Start: 2019-03-06 | End: 2020-02-28 | Stop reason: SDUPTHER

## 2019-03-06 RX ORDER — LEVOCETIRIZINE DIHYDROCHLORIDE 5 MG/1
5 TABLET, FILM COATED ORAL NIGHTLY
Qty: 90 TABLET | Refills: 3 | Status: SHIPPED | OUTPATIENT
Start: 2019-03-06 | End: 2020-05-06 | Stop reason: SDUPTHER

## 2019-03-06 RX ORDER — LEVOTHYROXINE SODIUM 100 UG/1
100 TABLET ORAL DAILY
Qty: 90 TABLET | Refills: 3 | Status: SHIPPED | OUTPATIENT
Start: 2019-03-06 | End: 2020-02-11 | Stop reason: SDUPTHER

## 2019-03-06 RX ORDER — PANTOPRAZOLE SODIUM 40 MG/1
40 TABLET, DELAYED RELEASE ORAL EVERY MORNING
Qty: 90 TABLET | Refills: 3 | Status: SHIPPED | OUTPATIENT
Start: 2019-03-06 | End: 2020-05-06 | Stop reason: SDUPTHER

## 2019-03-06 RX ORDER — CITALOPRAM 40 MG/1
40 TABLET, FILM COATED ORAL DAILY
Qty: 90 TABLET | Refills: 3 | Status: SHIPPED | OUTPATIENT
Start: 2019-03-06 | End: 2020-02-28 | Stop reason: SDUPTHER

## 2019-03-06 NOTE — PATIENT INSTRUCTIONS
"Patient Instructions   Why you dont need a steroid shot  Many patients come to the clinic wanting a steroid shot.  They have been lead to believe that a steroid injection is some type of magic bullet and will make them better faster.  Steroids are drugs that affect your whole body and can have serious side effects.  Urgent Care clinics are increasing their use mainly for financial gain.    What are the possible side effects of steroids?  The chance of side effects depends on the dose, type of steroid, and length of treatment. Some side effects are more serious than others. Common side effects of systemic steroids include:  Increased appetite, weight gain   Sudden mood swings   Muscle weakness   Blurred vision   Increased growth of body hair   Easy bruising   Lower resistance to infection   Swollen, "puffy" face   Acne   Osteoporosis (bone weakening disease)   Worsening of diabetes   High blood pressure   Stomach irritation   Nervousness, restlessness   Having difficulty sleeping   Cataracts or glaucoma    Aseptic necrosis- a condition where a joint doesnt get enough blood supply and dies  Water retention, swelling  Low potassium, magnesium  Cataracts  Seizures  Stomach ulcers  Source: [facility], Up to Date  There are certain situations where a steroid shot is needed and appropriate - severe asthma attack, allergic reaction, swelling of face due to an adverse drug reaction, some ICU patients.  Having a sinus infection IS NOT A REASON FOR A STEROID INJECTION.      An UPPER RESPIRATORY ILLNESS is initially caused by a virus or allergies 95% of the time. The goal to help you feel better is drying up the drainage & stopping the cough so the virus can run it's course in about 10 days. If your drainage becomes more thick and worse after 7-10 days of trying the below over the counter medications, please make an appointment for further evaluation If you have post nasal drip , "runny nose" or sore throat from " "clearing your throat : Take an ANTIHISTAMINE (Claritin or Zyrtec or Allegra ) once or twice a day. Nasal Saline: Tilt head back and squirt into nostril 2-3 times until you taste saline in back of throat. Spit, and blow nose. Do this 4 times, alternating right and left nostril. Do this routine 2-3 times per day. If you still have drainage or ear popping/ pressure/ decreased hearing, and you do NOT have high blood pressure: You can add a DECONGESTANT like Sudafed (if it doesn't cause palpitations) or Sudafed PE In the MORNING. If you have thick mucus drainage from the nose or coughing up thick phlegm: You can add a MUCOLYTIC like Mucinex (plain) If your cough persist: You can add a COUGH SUPPRESSANT like Delsym (dextromethorphan) twice a day If you have pain, sore throat, fever or chills: You can alternate 500mg of Tylenol with 400mg of ibuprofen every 3 hours - for the next 3 days Discontinue and call me if you have stomach upset For SORE THROAT , try: Gargle with warm salt water 2-3 times per day. Drink lots of WATER until your urine is clear because all of the above medications can "dry you out" and cause constipation. Come & see me if you are not better in 7-10 days or if your symptoms worsen.        "

## 2019-03-06 NOTE — PROGRESS NOTES
Subjective:       Patient ID: Trinity Valenzuela is a 68 y.o. female.    Chief Complaint: Hiatal Hernia   Urgent visit    She had a cough, went to urgent care and got a steroid injection.  They also did a chest x-ray which showed a large hiatal hernia.  She has discomfort from coughing, and heartburn.    She was educated about the dangers of steroid injections and it is hoped that she will not accept a steroid injection or steroids orally in the future.    All of her medical problems are addressed and her medications reviewed.    She stop taking Aricept 5 mg daily and notices no difference in her cognitive function.  She had requested this medication following of concussion but she no longer thinks this medication is necessary.  HPI  Review of Systems   Constitutional: Positive for unexpected weight change. Negative for activity change.   HENT: Negative for hearing loss, rhinorrhea and trouble swallowing.    Eyes: Positive for visual disturbance. Negative for discharge.   Respiratory: Positive for chest tightness. Negative for wheezing.    Cardiovascular: Positive for chest pain. Negative for palpitations.   Gastrointestinal: Negative for blood in stool, constipation, diarrhea and vomiting.   Endocrine: Negative for polydipsia and polyuria.   Genitourinary: Negative for difficulty urinating, dysuria, hematuria and menstrual problem.   Musculoskeletal: Negative for arthralgias, joint swelling and neck pain.   Neurological: Negative for weakness and headaches.   Psychiatric/Behavioral: Negative for confusion and dysphoric mood.       Objective:      Physical Exam   Constitutional: She is oriented to person, place, and time. She appears well-developed and well-nourished. No distress.   HENT:   Head: Normocephalic and atraumatic.   Right Ear: External ear normal.   Left Ear: External ear normal.   Nose: Nose normal.   Mouth/Throat: Oropharynx is clear and moist. No oropharyngeal exudate.   Eyes: Conjunctivae and EOM are  normal. Pupils are equal, round, and reactive to light. Right eye exhibits no discharge. No scleral icterus.   Neck: Normal range of motion and full passive range of motion without pain. Neck supple. No spinous process tenderness and no muscular tenderness present. Carotid bruit is not present. No thyromegaly present.   Cardiovascular: Normal rate, regular rhythm, S1 normal, S2 normal, normal heart sounds and intact distal pulses. Exam reveals no gallop and no friction rub.   No murmur heard.  Pulmonary/Chest: Effort normal and breath sounds normal. No respiratory distress. She has no wheezes. She has no rales. She exhibits no tenderness.   Abdominal: Soft. Bowel sounds are normal. She exhibits no distension and no mass. There is no tenderness. There is no rebound and no guarding.   Genitourinary: Pelvic exam was performed with patient supine. Uterus is not deviated, not enlarged, not fixed and not tender. Cervix exhibits no motion tenderness, no discharge and no friability. Right adnexum displays no mass, no tenderness and no fullness. Left adnexum displays no mass, no tenderness and no fullness.   Musculoskeletal: Normal range of motion. She exhibits no edema or tenderness.   Lymphadenopathy:        Head (right side): No submental and no submandibular adenopathy present.        Head (left side): No submental and no submandibular adenopathy present.     She has no cervical adenopathy.        Right cervical: No superficial cervical, no deep cervical and no posterior cervical adenopathy present.       Left cervical: No superficial cervical, no deep cervical and no posterior cervical adenopathy present.        Right axillary: No pectoral and no lateral adenopathy present.        Left axillary: No pectoral and no lateral adenopathy present.       Right: No supraclavicular adenopathy present.        Left: No supraclavicular adenopathy present.   Neurological: She is alert and oriented to person, place, and time. She has  normal reflexes. No cranial nerve deficit. She exhibits normal muscle tone. Coordination normal.   Skin: Skin is warm and dry. No rash noted.   Psychiatric: She has a normal mood and affect. Her behavior is normal. Her mood appears not anxious. Her speech is not rapid and/or pressured. She is not agitated. She does not exhibit a depressed mood.   Normal behavior, thought content, insight and judgement.       Assessment:       1. Gastroesophageal reflux disease without esophagitis    2. Hiatal hernia    3. Duodenitis, EGD 12-    4. Essential hypertension    5. Recurrent major depressive disorder, in partial remission    6. History of hepatitis C, treated, no virus detectable 2015    7. Vitamin B 12 deficiency    8. Primary hypothyroidism    9. S/P total thyroidectomy, 2007, for multinodular goiter        Plan:   Trinity was seen today for hiatal hernia.    Diagnoses and all orders for this visit:    Gastroesophageal reflux disease without esophagitis, recommend increasing pantoprazole 40 mg once daily until the symptoms improved.  -     CBC auto differential; Future    Hiatal hernia, non pharmacologic approaches to the management of hiatal hernia are discussed  -     CBC auto differential; Future    Duodenitis, EGD 12-, she has not noticed any blood in her stool or black tarry stool and she has not been nauseated and has had no vomiting.  -     CBC auto differential; Future    Essential hypertension, she is encouraged to monitor her blood pressure at home.  Today her blood pressure is 118/76.  -     losartan-hydrochlorothiazide 50-12.5 mg (HYZAAR) 50-12.5 mg per tablet; Take 1 tablet by mouth every morning.    Recurrent major depressive disorder, in partial remission, she would like to continue on citalopram, which helps her and she thinks the dose of 40 mg daily is necessary.  She is afraid to reduce the dose to 20 mg once daily.  -     citalopram (CELEXA) 40 MG tablet; Take 1 tablet (40 mg total) by  mouth once daily.    History of hepatitis C, treated, no virus detectable 2015, monitor liver enzymes.  No evidence of liver dysfunction.  -     CBC auto differential; Future  -     Comprehensive metabolic panel; Future    Vitamin B 12 deficiency  -     Vitamin B12; Future    Primary hypothyroidism  -     TSH; Future    S/P total thyroidectomy, 2007, for multinodular goiter  -     TSH; Future    Other orders  -     pantoprazole (PROTONIX) 40 MG tablet; Take 1 tablet (40 mg total) by mouth every morning.  -     levothyroxine (SYNTHROID) 100 MCG tablet; Take 1 tablet (100 mcg total) by mouth once daily.  -     levocetirizine (XYZAL) 5 MG tablet; Take 1 tablet (5 mg total) by mouth every evening.    Follow-up in about 5 months (around 8/18/2019) for after labs.

## 2019-04-10 ENCOUNTER — PATIENT MESSAGE (OUTPATIENT)
Dept: INTERNAL MEDICINE | Facility: CLINIC | Age: 69
End: 2019-04-10

## 2019-05-16 ENCOUNTER — PATIENT MESSAGE (OUTPATIENT)
Dept: INTERNAL MEDICINE | Facility: CLINIC | Age: 69
End: 2019-05-16

## 2019-05-16 DIAGNOSIS — G47.00 INSOMNIA, UNSPECIFIED TYPE: ICD-10-CM

## 2019-05-16 DIAGNOSIS — G25.81 RLS (RESTLESS LEGS SYNDROME): ICD-10-CM

## 2019-05-16 DIAGNOSIS — F90.0 ATTENTION DEFICIT HYPERACTIVITY DISORDER (ADHD), PREDOMINANTLY INATTENTIVE TYPE: Primary | ICD-10-CM

## 2019-05-16 DIAGNOSIS — E03.9 PRIMARY HYPOTHYROIDISM: ICD-10-CM

## 2019-05-16 DIAGNOSIS — R40.0 HAS DAYTIME DROWSINESS: ICD-10-CM

## 2019-05-16 DIAGNOSIS — G25.0 TREMOR, ESSENTIAL: ICD-10-CM

## 2019-05-16 NOTE — TELEPHONE ENCOUNTER
From: Trinity Valenzuela     Sent: 5/16/2019  1:42 PM CDT       To: Jesika Quevedo MD  Subject: Referral Request    Would you order asleep study ?  I never feel like I slept last nite.    Please advise  Thank you

## 2019-05-22 ENCOUNTER — TELEPHONE (OUTPATIENT)
Dept: NEUROLOGY | Facility: CLINIC | Age: 69
End: 2019-05-22

## 2019-06-26 ENCOUNTER — TELEPHONE (OUTPATIENT)
Dept: SLEEP MEDICINE | Facility: CLINIC | Age: 69
End: 2019-06-26

## 2019-07-01 ENCOUNTER — OFFICE VISIT (OUTPATIENT)
Dept: SLEEP MEDICINE | Facility: CLINIC | Age: 69
End: 2019-07-01
Payer: MEDICARE

## 2019-07-01 VITALS
DIASTOLIC BLOOD PRESSURE: 84 MMHG | HEIGHT: 62 IN | SYSTOLIC BLOOD PRESSURE: 144 MMHG | BODY MASS INDEX: 31.68 KG/M2 | WEIGHT: 172.19 LBS | HEART RATE: 75 BPM

## 2019-07-01 DIAGNOSIS — Z90.89 HISTORY OF TONSILLECTOMY: ICD-10-CM

## 2019-07-01 DIAGNOSIS — G47.30 SLEEP APNEA, UNSPECIFIED TYPE: Primary | ICD-10-CM

## 2019-07-01 PROCEDURE — 1101F PT FALLS ASSESS-DOCD LE1/YR: CPT | Mod: CPTII,S$GLB,, | Performed by: NURSE PRACTITIONER

## 2019-07-01 PROCEDURE — 99999 PR PBB SHADOW E&M-EST. PATIENT-LVL III: CPT | Mod: PBBFAC,,, | Performed by: NURSE PRACTITIONER

## 2019-07-01 PROCEDURE — 1101F PR PT FALLS ASSESS DOC 0-1 FALLS W/OUT INJ PAST YR: ICD-10-PCS | Mod: CPTII,S$GLB,, | Performed by: NURSE PRACTITIONER

## 2019-07-01 PROCEDURE — 99203 OFFICE O/P NEW LOW 30 MIN: CPT | Mod: S$GLB,,, | Performed by: NURSE PRACTITIONER

## 2019-07-01 PROCEDURE — 3079F PR MOST RECENT DIASTOLIC BLOOD PRESSURE 80-89 MM HG: ICD-10-PCS | Mod: CPTII,S$GLB,, | Performed by: NURSE PRACTITIONER

## 2019-07-01 PROCEDURE — 99999 PR PBB SHADOW E&M-EST. PATIENT-LVL III: ICD-10-PCS | Mod: PBBFAC,,, | Performed by: NURSE PRACTITIONER

## 2019-07-01 PROCEDURE — 99203 PR OFFICE/OUTPT VISIT, NEW, LEVL III, 30-44 MIN: ICD-10-PCS | Mod: S$GLB,,, | Performed by: NURSE PRACTITIONER

## 2019-07-01 PROCEDURE — 3079F DIAST BP 80-89 MM HG: CPT | Mod: CPTII,S$GLB,, | Performed by: NURSE PRACTITIONER

## 2019-07-01 PROCEDURE — 3077F SYST BP >= 140 MM HG: CPT | Mod: CPTII,S$GLB,, | Performed by: NURSE PRACTITIONER

## 2019-07-01 PROCEDURE — 3077F PR MOST RECENT SYSTOLIC BLOOD PRESSURE >= 140 MM HG: ICD-10-PCS | Mod: CPTII,S$GLB,, | Performed by: NURSE PRACTITIONER

## 2019-07-01 NOTE — PATIENT INSTRUCTIONS
Korey or Maria Luz will contact you to schedule your sleep study. Their number is [phone] (ext [phone]). The [facility] is located on 7th floor of the [facility].    If you have not been contacted regarding scheduling your sleep test after one week from today, please notify me via [facility] or by phone by calling [phone] (ext 1).     We will call you when the sleep study results are ready - if you have not heard from us by 2 weeks from the date of the study, please call [phone] (ext 1).    You are advised to abstain from driving should you feel sleepy or drowsy.

## 2019-07-01 NOTE — LETTER
July 1, 2019      ERIK STARK  [address]  [facility] [address]           [facility] [place] [address] [facility] [address]  [address]  [facility] [address]  Phone: [phone]          Patient: Trintiy Valenzuela   MR Number: [id number]   YOB: 1950   Date of Visit: 7/1/2019       Dear Erik Stark:    Thank you for referring Trinity Valenzuela to me for evaluation. Attached you will find relevant portions of my assessment and plan of care.    If you have questions, please do not hesitate to call me. I look forward to following Trinity Valenzuela along with you.    Sincerely,    Bala Smith, BRANDON    Enclosure  CC:  No Recipients    If you would like to receive this communication electronically, please contact [email or web address] or [phone] to request more information on [employer] access.    For providers and/or their staff who would like to refer a patient to [facility], please contact us through our one-stop-shop provider referral line, [facility] , at [phone].    If you feel you have received this communication in error or would no longer like to receive these types of communications, please e-mail [email or web address]

## 2019-07-01 NOTE — PROGRESS NOTES
"Trinity Valenzuela  was seen as a new patient at the request of Jesika Sanders for the evaluation of obstructive sleep apnea.    CHIEF COMPLAINT:  Unrefreshing sleep     07/01/2019 SUE Smith NP: Initial HISTORY OF PRESENT ILLNESS: Trinity Valenzuela is a 69 y.o. female is here for sleep evaluation.       Referred by PCP per pt request "Would you order asleep study ?  I never feel like I slept last nite".     Patient complaints include: snoring, unrefreshing sleep, and excessive tiredness no matter adequate opportunity for sleep.   Had PSG > 10 years ago - was told only RLS no TATUM.   RLS resolved when iron replaced  Some limb movements observed     Denies symptoms of restless legs or kicking during sleep.    Occupation: retired nurse     [place] Sleepiness Scale score during initial sleep evaluation was 12.    SLEEP ROUTINE:    Bed partner: none,    Time to bed:  10:30 pm  Sleep onset latency: < 30 minutes        Disruptions or awakenings:  none  Wakeup time:   8 am  Perceived sleep quality:  Fair            PAST MEDICAL HISTORY:    Active Ambulatory Problems     Diagnosis Date Noted    ADHD (attention deficit hyperactivity disorder) 06/06/2014    RLS (restless legs syndrome) 06/06/2014    Tremor, essential 06/06/2014    Concussion syndrome 06/06/2014    Memory loss 06/06/2014    GERD (gastroesophageal reflux disease) 11/04/2014    Primary hypothyroidism 11/04/2014    Major depression 11/04/2014    Chronic sinusitis 11/04/2014    History of hepatitis C, treated, no virus detectable 2015 03/26/2015    Chronic diarrhea 03/26/2015    Insomnia 05/13/2015    Needs flu shot 12/01/2015    Need for vaccination with 13-polyvalent pneumococcal conjugate vaccine 12/01/2015    Need for shingles vaccine 12/01/2015    Allergic 12/01/2015    Osteoporosis 12/01/2015    Screen for colon cancer 12/01/2015    Screening for breast cancer 12/01/2015    Iron deficiency anemia due to chronic blood loss 08/25/2016 "    TBI (traumatic brain injury) 09/07/2016    Positive occult stool blood test 12/09/2016    Duodenitis, EGD 12- 01/10/2017    Postmenopausal status 02/01/2017    Mild cognitive disorder 07/04/2017    Anxiety disorder 07/04/2017    High risk medication use, PPI 03/29/2018    Vitamin B 12 deficiency 06/04/2018    Viral URI with cough 02/07/2019    Hiatal hernia 03/06/2019    Essential hypertension 03/06/2019    S/P total thyroidectomy, 2007, for multinodular goiter 03/06/2019     Resolved Ambulatory Problems     Diagnosis Date Noted    Depressive disorder, not elsewhere classified 06/06/2014    Attention deficit disorder (ADD) 06/20/2014    Insomnia 08/11/2014    HTN (hypertension) 11/04/2014    Need for Tdap vaccination 11/04/2014    Need for pneumococcal vaccination 11/04/2014    Hair loss 08/25/2015    Annual physical exam 12/01/2015    Anemia 09/07/2016    Iron deficiency 12/09/2016     Past Medical History:   Diagnosis Date    Depression     GERD (gastroesophageal reflux disease)     Hypertension     Thyroid disease                 PAST SURGICAL HISTORY:    Past Surgical History:   Procedure Laterality Date    HYSTERECTOMY      THYROIDECTOMY      TONSILLECTOMY           FAMILY HISTORY:                Family History   Problem Relation Age of Onset    Hypertension Mother     Hyperlipidemia Mother     Breast cancer Mother     Diabetes Father     Hypertension Father     Hyperlipidemia Father     Migraines Brother     BRCA 1/2 Daughter        SOCIAL HISTORY:          Tobacco:   Social History     Tobacco Use   Smoking Status Never Smoker   Smokeless Tobacco Never Used       Alcohol use:    Social History     Substance and Sexual Activity   Alcohol Use No                 ALLERGIES:    Review of patient's allergies indicates:   Allergen Reactions    Penicillins Anaphylaxis and Itching       CURRENT MEDICATIONS:    Current Outpatient Medications   Medication Sig Dispense  "Refill    citalopram (CELEXA) 40 MG tablet Take 1 tablet (40 mg total) by mouth once daily. 90 tablet 3    ferrous sulfate (IRON) 325 mg (65 mg iron) Tab tablet Take 1 tablet (325 mg total) by mouth every other day. 45 tablet     levocetirizine (XYZAL) 5 MG tablet Take 1 tablet (5 mg total) by mouth every evening. 90 tablet 3    levothyroxine (SYNTHROID) 100 MCG tablet Take 1 tablet (100 mcg total) by mouth once daily. 90 tablet 3    losartan-hydrochlorothiazide 50-12.5 mg (HYZAAR) 50-12.5 mg per tablet Take 1 tablet by mouth every morning. 90 tablet 3    pantoprazole (PROTONIX) 40 MG tablet Take 1 tablet (40 mg total) by mouth every morning. 90 tablet 3     No current facility-administered medications for this visit.                   REVIEW OF SYSTEMS:     Sleep related symptoms as per HPI.  CONST:Denies weight gain    HEENT: Denies sinus congestion  PULM: Denies dyspnea  CARD:  Denies palpitations   GI:  Reports acid reflux  : Denies polyuria  NEURO: Denies headaches  PSYCH: Denies mood disturbance  HEME: Denies anemia    Otherwise, a balance of 10 systems reviewed is negative.          PHYSICAL EXAM:  BP (!) 144/84   Pulse 75   Ht 5' 2" (1.575 m)   Wt 78.1 kg (172 lb 2.9 oz)   BMI 31.49 kg/m²   GENERAL: Normal development, well groomed  HEENT:  Conjunctivae are non-erythematous; Nose is symmetrical; Nasal mucosa is pink and moist; Septum is midline; Inferior turbinates are normal; Nasal airflow is normal; Posterior pharynx is pink; Modified Mallampati: IV; Posterior palate is normal; Tonsils surgically absent; Uvula is normal and pink;Tongue is normal; Dentition is fair; No TMJ tenderness; Jaw opening and protrusion without click and without discomfort.  NECK: Supple. Neck circumference is 11.5 inches. No thyromegaly. No palpable nodes.    SKIN: On face and neck: No abrasions, no rashes, no lesions.  No subcutaneous nodules are palpable.  RESPIRATORY: Normal chest expansion and non-labored breathing " at rest.  CARDIOVASCULAR: Normal rate  EXTREMITIES: No edema. No clubbing. No cyanosis. Station normal. Gait normal.        NEURO/PSYCH: Oriented to time, place and person. Normal attention span and concentration. Affect is full. Mood is normal.                                              ASSESSMENT:    Sleep apnea, unspecified. The patient symptomatically has snoring, unrefreshing sleep, and excessive tiredness  with findings of crowded oral airway and elevated body mass index. Medical co-morbidities: depression/anxiety, ADHD, and HTN.  This warrants further investigation for possible obstructive sleep apnea.      Hx tonsillectomy     Suspected periodic limb movement disorder, objective repetitive limb movement during sleep and excessive daytime sleepiness     Tiredness/fatigue, multifactorial: underlying TATUM, anxiety/depression, hx TBI    Hx turbinate reduction surgery (?) in 2012 for nasal congestion     PLAN:    Diagnostic: Polysomnogram, in-lab to evaluate both sleep-disordered breathing and limb movements. The nature of this procedure and its indication was discussed with the patient. Patient will be contacted after sleep study is done.  Email results, RTC prn.     Education: During our discussion today, we talked about the etiology of obstructive sleep apnea as well as the potential ramifications of untreated sleep apnea, which could include daytime sleepiness, hypertension, heart disease and/or stroke. We discussed potential treatment options, which could include weight loss, body positioning, continuous positive airway pressure (CPAP), OA, EPAP, or referral for surgical consideration.     Precautions: The patient was advised to abstain from driving should they feel sleepy  or drowsy.     Thank you for allowing me the opportunity to participate in the care of your patient.

## 2019-07-02 ENCOUNTER — PATIENT MESSAGE (OUTPATIENT)
Dept: INTERNAL MEDICINE | Facility: CLINIC | Age: 69
End: 2019-07-02

## 2019-07-09 ENCOUNTER — TELEPHONE (OUTPATIENT)
Dept: SLEEP MEDICINE | Facility: OTHER | Age: 69
End: 2019-07-09

## 2019-07-13 ENCOUNTER — PATIENT MESSAGE (OUTPATIENT)
Dept: SLEEP MEDICINE | Facility: CLINIC | Age: 69
End: 2019-07-13

## 2019-07-15 ENCOUNTER — TELEPHONE (OUTPATIENT)
Dept: SLEEP MEDICINE | Facility: OTHER | Age: 69
End: 2019-07-15

## 2019-08-02 ENCOUNTER — HOSPITAL ENCOUNTER (OUTPATIENT)
Dept: SLEEP MEDICINE | Facility: HOSPITAL | Age: 69
Discharge: HOME | End: 2019-08-02
Attending: NURSE PRACTITIONER
Payer: MEDICARE

## 2019-08-02 DIAGNOSIS — G47.30 SLEEP APNEA, UNSPECIFIED TYPE: ICD-10-CM

## 2019-08-02 DIAGNOSIS — G47.33 OSA (OBSTRUCTIVE SLEEP APNEA): ICD-10-CM

## 2019-08-02 PROCEDURE — 95810 POLYSOM 6/> YRS 4/> PARAM: CPT | Mod: 26,,, | Performed by: INTERNAL MEDICINE

## 2019-08-02 PROCEDURE — 95810 PR POLYSOMNOGRAPHY, 4 OR MORE: ICD-10-PCS | Mod: 26,,, | Performed by: INTERNAL MEDICINE

## 2019-08-02 PROCEDURE — 95810 POLYSOM 6/> YRS 4/> PARAM: CPT

## 2019-08-03 NOTE — PROGRESS NOTES
Education was done via explanation of sleep study process and procedure. All questions were observed.    Pt. did not meet criteria for CPAP. Observed poor sleep efficiency. No REM sleep was obtained.    Low sat of 88% was observed in study. EKG revealed NSR. Thank you letter was given in a.m.

## 2019-08-06 ENCOUNTER — PATIENT MESSAGE (OUTPATIENT)
Dept: SLEEP MEDICINE | Facility: CLINIC | Age: 69
End: 2019-08-06

## 2019-08-12 ENCOUNTER — PATIENT MESSAGE (OUTPATIENT)
Dept: SLEEP MEDICINE | Facility: CLINIC | Age: 69
End: 2019-08-12

## 2019-08-19 ENCOUNTER — PATIENT MESSAGE (OUTPATIENT)
Dept: SLEEP MEDICINE | Facility: CLINIC | Age: 69
End: 2019-08-19

## 2019-08-20 ENCOUNTER — PATIENT MESSAGE (OUTPATIENT)
Dept: SLEEP MEDICINE | Facility: CLINIC | Age: 69
End: 2019-08-20

## 2019-08-20 DIAGNOSIS — G25.81 RLS (RESTLESS LEGS SYNDROME): Primary | ICD-10-CM

## 2019-08-20 RX ORDER — PRAMIPEXOLE DIHYDROCHLORIDE 0.12 MG/1
0.12 TABLET ORAL NIGHTLY
Qty: 90 TABLET | Refills: 3 | Status: SHIPPED | OUTPATIENT
Start: 2019-08-20 | End: 2020-02-28

## 2019-08-20 NOTE — TELEPHONE ENCOUNTER
Plan: Mirapex Initial: 0.125 mg once daily 2 to 3 hours before bedtime. Dose may be doubled every 4 to 7 days up to 0.5 mg once daily.   If pharm management does not improve/control RLS, further discuss trial CPAP despite mildness of TATUM.

## 2019-08-21 ENCOUNTER — PATIENT MESSAGE (OUTPATIENT)
Dept: SLEEP MEDICINE | Facility: CLINIC | Age: 69
End: 2019-08-21

## 2019-08-22 ENCOUNTER — PATIENT MESSAGE (OUTPATIENT)
Dept: SLEEP MEDICINE | Facility: CLINIC | Age: 69
End: 2019-08-22

## 2019-08-22 DIAGNOSIS — G47.30 SLEEP APNEA, UNSPECIFIED TYPE: Primary | ICD-10-CM

## 2019-08-23 ENCOUNTER — PATIENT MESSAGE (OUTPATIENT)
Dept: SLEEP MEDICINE | Facility: CLINIC | Age: 69
End: 2019-08-23

## 2019-08-23 NOTE — TELEPHONE ENCOUNTER
Pt would like to repeat sleep test in home setting where she feels most comfortable and because she feels like sleep efficiency was poor during evening of Aug 2019 PSG. Of note, sleep efficiency during 08/02/2019 PSG was 46.6%.

## 2019-08-27 ENCOUNTER — TELEPHONE (OUTPATIENT)
Dept: SLEEP MEDICINE | Facility: OTHER | Age: 69
End: 2019-08-27

## 2019-09-06 DIAGNOSIS — R10.13 EPIGASTRIC PAIN: ICD-10-CM

## 2019-09-06 DIAGNOSIS — K21.9 GASTROESOPHAGEAL REFLUX DISEASE, ESOPHAGITIS PRESENCE NOT SPECIFIED: Primary | ICD-10-CM

## 2019-09-24 ENCOUNTER — TELEPHONE (OUTPATIENT)
Dept: SLEEP MEDICINE | Facility: OTHER | Age: 69
End: 2019-09-24

## 2019-09-25 ENCOUNTER — HOSPITAL ENCOUNTER (OUTPATIENT)
Dept: SLEEP MEDICINE | Facility: OTHER | Age: 69
Discharge: HOME | End: 2019-09-25
Attending: NURSE PRACTITIONER
Payer: MEDICARE

## 2019-09-25 DIAGNOSIS — G47.30 SLEEP APNEA, UNSPECIFIED TYPE: ICD-10-CM

## 2019-09-25 PROCEDURE — 95800 SLP STDY UNATTENDED: CPT

## 2019-10-01 PROCEDURE — 95800 PR SLEEP STUDY, UNATTENDED, RECORD HEART RATE/O2 SAT/RESP ANAL/SLEEP TIME: ICD-10-PCS | Mod: 26,,, | Performed by: PSYCHIATRY & NEUROLOGY

## 2019-10-01 PROCEDURE — 95800 SLP STDY UNATTENDED: CPT | Mod: 26,,, | Performed by: PSYCHIATRY & NEUROLOGY

## 2019-10-02 ENCOUNTER — HOSPITAL ENCOUNTER (OUTPATIENT)
Facility: HOSPITAL | Age: 69
Discharge: HOME | End: 2019-10-02
Attending: INTERNAL MEDICINE | Admitting: INTERNAL MEDICINE
Payer: MEDICARE

## 2019-10-02 VITALS
OXYGEN SATURATION: 95 % | SYSTOLIC BLOOD PRESSURE: 139 MMHG | DIASTOLIC BLOOD PRESSURE: 77 MMHG | TEMPERATURE: 98 F | WEIGHT: 165 LBS | HEART RATE: 64 BPM | BODY MASS INDEX: 30.36 KG/M2 | RESPIRATION RATE: 18 BRPM | HEIGHT: 62 IN

## 2019-10-02 DIAGNOSIS — K21.9 GERD (GASTROESOPHAGEAL REFLUX DISEASE): ICD-10-CM

## 2019-10-02 PROCEDURE — 91010 ESOPHAGUS MOTILITY STUDY: CPT | Mod: 26,,, | Performed by: INTERNAL MEDICINE

## 2019-10-02 PROCEDURE — 91010 ESOPHAGUS MOTILITY STUDY: CPT | Performed by: INTERNAL MEDICINE

## 2019-10-02 PROCEDURE — 91037 ESOPH IMPED FUNCTION TEST: CPT | Performed by: INTERNAL MEDICINE

## 2019-10-02 PROCEDURE — 91037 ESOPH IMPED FUNCTION TEST: CPT | Mod: 26,,, | Performed by: INTERNAL MEDICINE

## 2019-10-02 PROCEDURE — 91037 PR GERD TST W/ NASAL IMPEDENCE ELECTROD: ICD-10-PCS | Mod: 26,,, | Performed by: INTERNAL MEDICINE

## 2019-10-02 PROCEDURE — 25000003 PHARM REV CODE 250: Performed by: INTERNAL MEDICINE

## 2019-10-02 PROCEDURE — 91010 PR ESOPHAGEAL MOTILITY STUDY, MA2METRY: ICD-10-PCS | Mod: 26,,, | Performed by: INTERNAL MEDICINE

## 2019-10-02 RX ORDER — LIDOCAINE HYDROCHLORIDE 20 MG/ML
JELLY TOPICAL ONCE
Status: COMPLETED | OUTPATIENT
Start: 2019-10-02 | End: 2019-10-02

## 2019-10-02 RX ADMIN — LIDOCAINE HYDROCHLORIDE 10 ML: 20 JELLY TOPICAL at 08:10

## 2019-10-02 NOTE — DISCHARGE INSTRUCTIONS
Esophageal Manometry     A catheter measures pressure along the esophagus.     Esophageal manometry is a test to measure the strength and function of the esophagus (the food pipe). Results can help identify causes of heartburn, swallowing problems, or chest pain. The test can also help plan surgery and determine the success of previous surgery.  Preparing for the test  Be sure to talk to your healthcare provider about any medicines you take. Some medicines can affect the test results. Also ask any questions you have about the risks of the test. These include irritation to the nose and throat. Be sure not to smoke, eat, or drink for up to 12 hours before the test.  During the test  Manometry takes about an hour. Usually you lie down during the test. Your nose and throat are numbed. Then a soft, thin tube is placed through the nose and down the esophagus. At first you may notice a gagging feeling. You will be asked to swallow several times. Holes along the tube measure the pressure while you swallow. Measurements are printed out as tracings, much like a heart test tracing. After the test, another catheter may be left in the esophagus for up to 24 hours to measure acid (pH) levels.  After esophageal manometry  Youll probably discuss the results of the test with your healthcare provider at another appointment. This is because time is needed to review the tracings. You may have a mild sore throat for a short time. As soon as the numbness in your throat is gone, you can return to eating and your normal activities.  Date Last Reviewed: 6/1/2016  © 0529-9272 [employer]. [address] [place] [address], [place] [address]. All rights reserved. This information is not intended as a substitute for professional medical care. Always follow your healthcare professional's instructions.

## 2019-10-02 NOTE — PLAN OF CARE
ID applied and verified. Plan of care initiated with Trinity Valenzuela. Understanding verbalized.

## 2019-10-03 ENCOUNTER — PATIENT MESSAGE (OUTPATIENT)
Dept: SLEEP MEDICINE | Facility: CLINIC | Age: 69
End: 2019-10-03

## 2019-10-03 NOTE — PROCEDURES
"Dear Referring Provider,    The sleep study that you ordered is complete. You have ordered sleep LAB services to perform the sleep study for Trinity Valenzuela.     Please find Sleep Study result in "Chart Review" under the "Media tab."     As the ordering provider, you are responsible for reviewing the results and implementing a treatment plan with your patient. If you need a Sleep Medicine provider to explain the sleep study findings and arrange treatment for the patient, please refer patient for consultation to our Sleep Clinic via [facility] with Ambulatory Consult Sleep.    To do that please place an order for an "Ambulatory Consult Sleep" - it will go to our clinic work queue for our Medical Assistant to contact the patient for an appointment.     For any questions, please contact our clinic staff at [phone] to talk to clinical staff.      "

## 2019-10-10 ENCOUNTER — PATIENT MESSAGE (OUTPATIENT)
Dept: SLEEP MEDICINE | Facility: CLINIC | Age: 69
End: 2019-10-10

## 2019-10-10 DIAGNOSIS — G25.81 RLS (RESTLESS LEGS SYNDROME): ICD-10-CM

## 2019-10-10 DIAGNOSIS — G47.33 OSA (OBSTRUCTIVE SLEEP APNEA): Primary | ICD-10-CM

## 2019-10-28 ENCOUNTER — PATIENT MESSAGE (OUTPATIENT)
Dept: SLEEP MEDICINE | Facility: CLINIC | Age: 69
End: 2019-10-28

## 2019-10-29 ENCOUNTER — TELEPHONE (OUTPATIENT)
Dept: SLEEP MEDICINE | Facility: CLINIC | Age: 69
End: 2019-10-29

## 2019-10-29 NOTE — TELEPHONE ENCOUNTER
----- Message from Jony Marcus MA sent at 10/29/2019  2:22 PM CDT -----  Regarding: FW: Cpap      ----- Message -----  From: Cely Morales  Sent: 10/29/2019   2:19 PM CDT  To: Luis Sinclair Staff  Subject: Cpap                                             Per the new sleep study, pt does not qualify for a CPAP. AHI was 1.

## 2019-10-31 ENCOUNTER — PATIENT MESSAGE (OUTPATIENT)
Dept: SLEEP MEDICINE | Facility: CLINIC | Age: 69
End: 2019-10-31

## 2019-11-05 ENCOUNTER — PATIENT MESSAGE (OUTPATIENT)
Dept: SLEEP MEDICINE | Facility: CLINIC | Age: 69
End: 2019-11-05

## 2019-11-05 DIAGNOSIS — G47.33 OSA (OBSTRUCTIVE SLEEP APNEA): Primary | ICD-10-CM

## 2019-11-05 NOTE — TELEPHONE ENCOUNTER
Arnel, please send the following to [facility]:    1) Copy of CPAP machine order written today - write on order expedite auth/set up   2) Copy of in lab sleep test from 08/02/2019  3) Copy of 07/01/2019 progress note   4) Please be on the look out for the form [facility] will fax back for me to sign

## 2019-11-06 ENCOUNTER — PATIENT MESSAGE (OUTPATIENT)
Dept: SLEEP MEDICINE | Facility: CLINIC | Age: 69
End: 2019-11-06

## 2019-11-06 ENCOUNTER — TELEPHONE (OUTPATIENT)
Dept: SLEEP MEDICINE | Facility: CLINIC | Age: 69
End: 2019-11-06

## 2019-11-06 NOTE — TELEPHONE ENCOUNTER
I faxed everything you requested to be faxed to them. Just waiting for them to send the Phys. order for you to sign.

## 2019-11-20 ENCOUNTER — PATIENT MESSAGE (OUTPATIENT)
Dept: SLEEP MEDICINE | Facility: CLINIC | Age: 69
End: 2019-11-20

## 2020-01-02 ENCOUNTER — TELEPHONE (OUTPATIENT)
Dept: SLEEP MEDICINE | Facility: CLINIC | Age: 70
End: 2020-01-02

## 2020-01-27 ENCOUNTER — TELEPHONE (OUTPATIENT)
Dept: SLEEP MEDICINE | Facility: CLINIC | Age: 70
End: 2020-01-27

## 2020-02-05 ENCOUNTER — PATIENT MESSAGE (OUTPATIENT)
Dept: INTERNAL MEDICINE | Facility: CLINIC | Age: 70
End: 2020-02-05

## 2020-02-06 ENCOUNTER — TELEPHONE (OUTPATIENT)
Dept: SLEEP MEDICINE | Facility: CLINIC | Age: 70
End: 2020-02-06

## 2020-02-06 ENCOUNTER — PATIENT MESSAGE (OUTPATIENT)
Dept: SLEEP MEDICINE | Facility: CLINIC | Age: 70
End: 2020-02-06

## 2020-02-06 NOTE — TELEPHONE ENCOUNTER
I contacted pt re the above, and she already has been taking the flagyl and has U/s appt for tomorrow. Ursula, can you help advise?

## 2020-02-06 NOTE — TELEPHONE ENCOUNTER
----- Message from Ursula Smith sent at 2/6/2020  2:53 PM CST -----  Contact: [phone]  You sent the patient to [facility]. I am not sure what they will do.

## 2020-02-07 ENCOUNTER — PATIENT MESSAGE (OUTPATIENT)
Dept: SLEEP MEDICINE | Facility: CLINIC | Age: 70
End: 2020-02-07

## 2020-02-10 ENCOUNTER — PATIENT MESSAGE (OUTPATIENT)
Dept: INTERNAL MEDICINE | Facility: CLINIC | Age: 70
End: 2020-02-10

## 2020-02-11 ENCOUNTER — PATIENT MESSAGE (OUTPATIENT)
Dept: INTERNAL MEDICINE | Facility: CLINIC | Age: 70
End: 2020-02-11

## 2020-02-11 DIAGNOSIS — E03.9 PRIMARY HYPOTHYROIDISM: Primary | ICD-10-CM

## 2020-02-11 DIAGNOSIS — E53.8 VITAMIN B 12 DEFICIENCY: Primary | ICD-10-CM

## 2020-02-11 RX ORDER — LEVOTHYROXINE SODIUM 50 UG/1
50 TABLET ORAL
Qty: 90 TABLET | Refills: 3 | Status: SHIPPED | OUTPATIENT
Start: 2020-02-11 | End: 2020-02-28

## 2020-02-11 NOTE — TELEPHONE ENCOUNTER
Please call patient.  I sent e mail  B 12 too low  Check level 4-6 wks after oral sipplementation 1000 mcg daily otcPlease schedule labs

## 2020-02-11 NOTE — TELEPHONE ENCOUNTER
From: Trinity Valenzuela     Sent: 2/11/2020 10:16 AM CST       To: Jesika Quevedo MD  Subject: Test Results    Dont I need a new prescription for my thyroid? I have lost 40 pounds. I know the dosage is off.

## 2020-02-13 ENCOUNTER — PATIENT OUTREACH (OUTPATIENT)
Dept: ADMINISTRATIVE | Facility: HOSPITAL | Age: 70
End: 2020-02-13

## 2020-02-14 RX ORDER — LEVOTHYROXINE SODIUM 100 UG/1
100 TABLET ORAL DAILY
Qty: 90 TABLET | Refills: 3 | OUTPATIENT
Start: 2020-02-14 | End: 2021-02-13

## 2020-02-14 NOTE — PROGRESS NOTES
Quick DC. Duplicate Request  Refill Authorization Note     is requesting a refill authorization.    Brief assessment and rationale for refill: Quick DC: duplicate Request   Name and strength of medication: levothyroxine (SYNTHROID) 100 MCG tablet            Medication reconciliation completed: No                         Comments:   Pended Medication(s)   Requested Prescriptions     Pending Prescriptions Disp Refills    levothyroxine (SYNTHROID) 100 MCG tablet 90 tablet 3     Sig: Take 1 tablet (100 mcg total) by mouth once daily.          Last Prescribed Info:        Ordering User:  Jesika Sanders MD               Original Order:  levothyroxine (SYNTHROID) 100 MCG tablet [[id number]]      Pharmacy:  [facility] [address] [place] [address] 90 SUHAS #:  [id number]     Pharmacy Comments:  --          Fill quantity remaining:  -- Fill quantity used:  -- Next fill due: --       Outpatient Medication Detail      Disp Refills Start End    levothyroxine (SYNTHROID) 50 MCG tablet 90 tablet 3 2/11/2020 2/10/2021    Sig - Route: Take 1 tablet (50 mcg total) by mouth before breakfast. - Oral    Sent to pharmacy as: levothyroxine (SYNTHROID) 50 MCG tablet    Notes to Pharmacy: Patient wants name brand Synthroid only she will pay more    E-Prescribing Status: Receipt confirmed by pharmacy (2/11/2020  6:40 PM CST)    Ordering Encounter Report     Associated Reports   View Encounter

## 2020-02-21 NOTE — TELEPHONE ENCOUNTER
From: Trinity Valenzuela     Sent: 2/20/2020  7:48 PM CST       To: Jesika Quevedo MD  Subject: RE: Synthroid    I went to see Dr Luis Carlos Villela today, he is the Endocrinoligist I use to go to. I goofed. I should not have cut Synthroid in half. I am going to repeat lab work in 6 weeks and see what he says to do. I think I will keep going to him for my thyroid. I' m sure you are relieved. I have to say I sure made a mess.I am having a lot of stomach issues right now. Dr Berrios is doing tests. I am sorry.

## 2020-02-24 NOTE — PROGRESS NOTES
"aANNUAL VISIT NOTE     PRESENTING HISTORY     Reason for Visit:  Annual visit.    No chief complaint on file.      History of Present Illness & ROS: Ms. Trinity Valenzuela is a 69 y.o. female.  New to this provider.   Here for Annual.   She is not taking several of her medications, because she "does not feel needed".   Having some "memory issues again". Stopped her Donpezil "more than a year ago".     She has 4 children and all are "very involved" with her. Has good support.   Having no other active issues at this time.     Review of Systems:  Eyes: denies visual changes at this time denies floaters   ENT: no nasal congestion or sore throat  Respiratory: no cough or shorness of breath  Cardiovascular: no chest pain or palpitations  Gastrointestinal: no nausea or vomiting, no abdominal pain or change in bowel habits  Genitourinary: no hematuria or dysuria; denies frequency  Hematologic/Lymphatic: no easy bruising or lymphadenopathy  Musculoskeletal: no arthralgias or myalgias  Neurological: no seizures or tremors  Endocrine: no heat or cold intolerance    PAST HISTORY:     Past Medical History:   Diagnosis Date    Depression     GERD (gastroesophageal reflux disease)     Hypertension     Thyroid disease        Past Surgical History:   Procedure Laterality Date    ESOPHAGEAL MANOMETRY WITH MEASUREMENT OF IMPEDANCE N/A 10/2/2019    Procedure: MANOMETRY, ESOPHAGUS, WITH IMPEDANCE MEASUREMENT;  Surgeon: Etelvina Urbina MD;  Location: [address];  Service: Endoscopy;  Laterality: N/A;  Referral from [facility]  9/25 - pt confirmed appt - sm    HYSTERECTOMY      NASAL TURBINATE REDUCTION  2012    THYROIDECTOMY      TONSILLECTOMY         Family History   Problem Relation Age of Onset    Hypertension Mother     Hyperlipidemia Mother     Breast cancer Mother     Cancer Mother         esophageal    Diabetes Father     Hypertension Father     Hyperlipidemia Father     Migraines Brother     " BRCA 1/2 Daughter        Social History     Socioeconomic History    Marital status:      Spouse name: Not on file    Number of children: Not on file    Years of education: Not on file    Highest education level: Not on file   Occupational History    Not on file   Social Needs    Financial resource strain: Not on file    Food insecurity:     Worry: Not on file     Inability: Not on file    Transportation needs:     Medical: Not on file     Non-medical: Not on file   Tobacco Use    Smoking status: Never Smoker    Smokeless tobacco: Never Used   Substance and Sexual Activity    Alcohol use: No    Drug use: No    Sexual activity: Not on file   Lifestyle    Physical activity:     Days per week: Not on file     Minutes per session: Not on file    Stress: Not on file   Relationships    Social connections:     Talks on phone: Not on file     Gets together: Not on file     Attends Mu-ism service: Not on file     Active member of club or organization: Not on file     Attends meetings of clubs or organizations: Not on file     Relationship status: Not on file   Other Topics Concern    Patient feels they ought to cut down on drinking/drug use Not Asked    Patient annoyed by others criticizing their drinking/drug use Not Asked    Patient has felt bad or guilty about drinking/drug use Not Asked    Patient has had a drink/used drugs as an eye opener in the AM Not Asked   Social History Narrative    2014.    The patient became  25 years ago.  Her   at the age of 42 from lung cancer.  He had been an .  They had 4 children and she had the pleasure of being a full-time mother during thier early childhood.     Following her 's death she returned to school and got a college degree in nursing.  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  It was very difficult to figure out what was going on with her cognitive function.  When she reached the age of 64, her employer, [employer] forced her to retire.          She is a good friend of [employer] and an avid .    She feels ready for residential but her 4 children think that she is depressed and that she needs to work.         She is working with a psychiatrist. She does not drink or smoke.        Her eldest daughter is now 44 years old and her youngest child is her only son who is 33 yeas old.       MEDICATIONS & ALLERGIES:     Current Outpatient Medications on File Prior to Visit   Medication Sig Dispense Refill    citalopram (CELEXA) 40 MG tablet Take 1 tablet (40 mg total) by mouth once daily. 90 tablet 3    ferrous sulfate (IRON) 325 mg (65 mg iron) Tab tablet Take 1 tablet (325 mg total) by mouth every other day. 45 tablet     levocetirizine (XYZAL) 5 MG tablet Take 1 tablet (5 mg total) by mouth every evening. 90 tablet 3    levothyroxine (SYNTHROID) 50 MCG tablet Take 1 tablet (50 mcg total) by mouth before breakfast. 90 tablet 3    losartan-hydrochlorothiazide 50-12.5 mg (HYZAAR) 50-12.5 mg per tablet Take 1 tablet by mouth every morning. 90 tablet 3    pantoprazole (PROTONIX) 40 MG tablet Take 1 tablet (40 mg total) by mouth every morning. 90 tablet 3    pramipexole (MIRAPEX) 0.125 MG tablet Take 1 tablet (0.125 mg total) by mouth every evening. 90 tablet 3     No current facility-administered medications on file prior to visit.         Review of patient's allergies indicates:   Allergen Reactions    Penicillins Anaphylaxis and Itching       Medications Reconciliation:   I have reconciled the patient's home medications and discharge medications with the  patient/family. I have updated all changes.  Refer to After-Visit Medication List.    OBJECTIVE:     Vital Signs:  There were no vitals filed for this visit.  Wt Readings from Last 1 Encounters:   01/19/20 1501 73.5 kg (162 lb)     There is no height or weight on file to calculate BMI.   Wt Readings from Last 3 Encounters:   02/28/20 74.1 kg (163 lb 5.8 oz)   01/19/20 73.5 kg (162 lb)   10/02/19 74.8 kg (165 lb)     Temp Readings from Last 3 Encounters:   01/19/20 97.8 °F (36.6 °C)   10/02/19 97.9 °F (36.6 °C) (Temporal)   02/07/19 97.7 °F (36.5 °C) (Oral)     BP Readings from Last 3 Encounters:   02/28/20 (!) 140/98   01/19/20 (!) 150/93   10/02/19 139/77     Pulse Readings from Last 3 Encounters:   02/28/20 70   01/19/20 66   10/02/19 64       Physical Exam:  General: Well developed, well nourished. No distress.  HEENT: Head is normocephalic, atraumatic; ears are normal.   Eyes: Clear conjunctiva.  Neck: Supple, symmetrical neck; trachea midline.  Lungs: Clear to auscultation bilaterally and normal respiratory effort.  Cardiovascular: Heart with regular rate and rhythm. No murmurs, gallops or rubs  Extremities: No LE edema. Pulses 2+ and symmetric.   Abdomen: Abdomen is soft, non-tender non-distended with normal bowel sounds.  Skin: Skin color, texture, turgor normal. No rashes.  Musculoskeletal: Normal gait.   Lymph Nodes: No cervical or supraclavicular adenopathy.  Neurologic: Normal strength and tone. No focal numbness or weakness.   Psychiatric: Not depressed.    Laboratory  Lab Results   Component Value Date    WBC 4.53 02/07/2020    HGB 13.5 02/07/2020    HCT 42.9 02/07/2020     02/07/2020    CHOL 199 08/13/2018    TRIG 160 (H) 08/13/2018    HDL 55 08/13/2018    ALT 14 02/07/2020    AST 28 02/07/2020     02/07/2020    K 4.1 02/07/2020     02/07/2020    CREATININE 0.66 02/07/2020    BUN 15 02/07/2020    CO2 27 02/07/2020    TSH 4.000 02/07/2020         ASSESSMENT & PLAN:       Preventative  "health care  -     Comprehensive metabolic panel; Future; Expected date: 02/28/2020  -     CBC auto differential; Future; Expected date: 02/28/2020  -     Lipid panel; Future; Expected date: 02/28/2020  -     Hemoglobin A1c; Future; Expected date: 02/28/2020  -     Ferritin; Future; Expected date: 02/28/2020      Essential hypertension  Today: 140/98 (she stopped the Hyzaar  4 weeks ago, reports that "was running good and didn't feel needed")  ` advised to resume the Hyzaar  -     losartan-hydrochlorothiazide 50-12.5 mg (HYZAAR) 50-12.5 mg per tablet; Take 1 tablet by mouth every morning.  Dispense: 90 tablet; Refill: 3      Iron deficiency anemia due to chronic blood loss  -     Ferritin; Future; Expected date: 02/28/2020      Primary hypothyroidism  ` Synthroid (being managed by an outside Endocrinologist at [facility], Dr. Levy); will follow up with his clinic for further mgt; defer; appt on 3/22/2020      TATUM (obstructive sleep apnea)  Not wearing CPAP  Appt with Sleep disorder clinic: 3/18      Recurrent major depressive disorder, in partial remission  Celexa   -     citalopram (CELEXA) 40 MG tablet; Take 1 tablet (40 mg total) by mouth once daily.  Dispense: 90 tablet; Refill: 3      GERD:  Follows up with outside GI provider at [facility]  Protonix (would recommend decreasing the dose)         Immunizations:   Shingrix Series: refused         *Informed that will perform the Annual today and will need to follow up with Dr. Sanders for resumption of her care.   She is interested in resuming her Donpezil or considering another alternative pharm option for her memory; she has been off the medication for > than a year.   Future Appointments   Date Time Provider Department Center   3/18/2020  9:40 AM Bala Smith NP [facility]        Medication List           Accurate as of February 28, 2020  9:24 AM. If you have any questions, ask your nurse or doctor.               CHANGE how you take these " medications    citalopram 40 MG tablet  Commonly known as:  CELEXA  Take 1 tablet (40 mg total) by mouth once daily.  What changed:  Another medication with the same name was removed. Continue taking this medication, and follow the directions you see here.  Changed by:  BARRIE Alcantar     Synthroid 100 MCG tablet  Generic drug:  levothyroxine  What changed:  Another medication with the same name was removed. Continue taking this medication, and follow the directions you see here.  Changed by:  BARRIE Alcantar        CONTINUE taking these medications    ferrous sulfate 325 mg (65 mg iron) Tab tablet  Commonly known as:  iron  Take 1 tablet (325 mg total) by mouth every other day.     levocetirizine 5 MG tablet  Commonly known as:  XYZAL  Take 1 tablet (5 mg total) by mouth every evening.     losartan-hydrochlorothiazide 50-12.5 mg 50-12.5 mg per tablet  Commonly known as:  HYZAAR  Take 1 tablet by mouth every morning.     melatonin 5 mg Cap     pantoprazole 40 MG tablet  Commonly known as:  PROTONIX  Take 1 tablet (40 mg total) by mouth every morning.        STOP taking these medications    metoclopramide HCl 5 MG tablet  Commonly known as:  REGLAN  Stopped by:  BARRIE Alcantar     pramipexole 0.125 MG tablet  Commonly known as:  MIRAPEX  Stopped by:  BARRIE Alcantar           Where to Get Your Medications      These medications were sent to [facility] [address] [place] [address]  [address] [facility] [address]    Phone:  [phone]   · citalopram 40 MG tablet  · losartan-hydrochlorothiazide 50-12.5 mg 50-12.5 mg per tablet          Signing Physician:  BARRIE Alcantar

## 2020-02-28 ENCOUNTER — OFFICE VISIT (OUTPATIENT)
Dept: INTERNAL MEDICINE | Facility: CLINIC | Age: 70
End: 2020-02-28
Payer: MEDICARE

## 2020-02-28 ENCOUNTER — IMMUNIZATION (OUTPATIENT)
Dept: PHARMACY | Facility: CLINIC | Age: 70
End: 2020-02-28
Payer: MEDICARE

## 2020-02-28 VITALS
HEART RATE: 70 BPM | HEIGHT: 62 IN | WEIGHT: 163.38 LBS | SYSTOLIC BLOOD PRESSURE: 140 MMHG | DIASTOLIC BLOOD PRESSURE: 98 MMHG | BODY MASS INDEX: 30.07 KG/M2 | OXYGEN SATURATION: 96 %

## 2020-02-28 DIAGNOSIS — E03.9 PRIMARY HYPOTHYROIDISM: ICD-10-CM

## 2020-02-28 DIAGNOSIS — D50.0 IRON DEFICIENCY ANEMIA DUE TO CHRONIC BLOOD LOSS: ICD-10-CM

## 2020-02-28 DIAGNOSIS — I10 ESSENTIAL HYPERTENSION: ICD-10-CM

## 2020-02-28 DIAGNOSIS — G47.33 OSA (OBSTRUCTIVE SLEEP APNEA): ICD-10-CM

## 2020-02-28 DIAGNOSIS — Z00.00 PREVENTATIVE HEALTH CARE: Primary | ICD-10-CM

## 2020-02-28 DIAGNOSIS — E53.8 VITAMIN B 12 DEFICIENCY: ICD-10-CM

## 2020-02-28 DIAGNOSIS — F33.41 RECURRENT MAJOR DEPRESSIVE DISORDER, IN PARTIAL REMISSION: ICD-10-CM

## 2020-02-28 DIAGNOSIS — R79.9 ABNORMAL FINDING OF BLOOD CHEMISTRY, UNSPECIFIED: ICD-10-CM

## 2020-02-28 PROCEDURE — 3080F DIAST BP >= 90 MM HG: CPT | Mod: CPTII,S$GLB,, | Performed by: NURSE PRACTITIONER

## 2020-02-28 PROCEDURE — 99397 PR PREVENTIVE VISIT,EST,65 & OVER: ICD-10-PCS | Mod: S$GLB,,, | Performed by: NURSE PRACTITIONER

## 2020-02-28 PROCEDURE — 3077F SYST BP >= 140 MM HG: CPT | Mod: CPTII,S$GLB,, | Performed by: NURSE PRACTITIONER

## 2020-02-28 PROCEDURE — 99999 PR PBB SHADOW E&M-EST. PATIENT-LVL III: ICD-10-PCS | Mod: PBBFAC,,, | Performed by: NURSE PRACTITIONER

## 2020-02-28 PROCEDURE — 3077F PR MOST RECENT SYSTOLIC BLOOD PRESSURE >= 140 MM HG: ICD-10-PCS | Mod: CPTII,S$GLB,, | Performed by: NURSE PRACTITIONER

## 2020-02-28 PROCEDURE — 3080F PR MOST RECENT DIASTOLIC BLOOD PRESSURE >= 90 MM HG: ICD-10-PCS | Mod: CPTII,S$GLB,, | Performed by: NURSE PRACTITIONER

## 2020-02-28 PROCEDURE — 99397 PER PM REEVAL EST PAT 65+ YR: CPT | Mod: S$GLB,,, | Performed by: NURSE PRACTITIONER

## 2020-02-28 PROCEDURE — 99999 PR PBB SHADOW E&M-EST. PATIENT-LVL III: CPT | Mod: PBBFAC,,, | Performed by: NURSE PRACTITIONER

## 2020-02-28 RX ORDER — METOCLOPRAMIDE 5 MG/1
TABLET ORAL
COMMUNITY
Start: 2020-02-17 | End: 2020-02-28

## 2020-02-28 RX ORDER — LOSARTAN POTASSIUM AND HYDROCHLOROTHIAZIDE 12.5; 5 MG/1; MG/1
1 TABLET ORAL EVERY MORNING
Qty: 90 TABLET | Refills: 3 | Status: SHIPPED | OUTPATIENT
Start: 2020-02-28 | End: 2020-05-06 | Stop reason: SDUPTHER

## 2020-02-28 RX ORDER — MELATONIN 5 MG
5 CAPSULE ORAL
COMMUNITY
End: 2020-05-06 | Stop reason: SDUPTHER

## 2020-02-28 RX ORDER — CITALOPRAM 40 MG/1
40 TABLET, FILM COATED ORAL
COMMUNITY
Start: 2019-10-16 | End: 2020-02-28

## 2020-02-28 RX ORDER — LEVOTHYROXINE SODIUM 100 UG/1
100 TABLET ORAL
COMMUNITY
Start: 2019-10-16 | End: 2020-03-11

## 2020-02-28 RX ORDER — CITALOPRAM 40 MG/1
40 TABLET, FILM COATED ORAL DAILY
Qty: 90 TABLET | Refills: 3 | Status: SHIPPED | OUTPATIENT
Start: 2020-02-28 | End: 2020-05-06 | Stop reason: SDUPTHER

## 2020-03-03 ENCOUNTER — TELEPHONE (OUTPATIENT)
Dept: SLEEP MEDICINE | Facility: CLINIC | Age: 70
End: 2020-03-03

## 2020-03-03 ENCOUNTER — PATIENT MESSAGE (OUTPATIENT)
Dept: SLEEP MEDICINE | Facility: CLINIC | Age: 70
End: 2020-03-03

## 2020-03-03 DIAGNOSIS — G47.33 OSA (OBSTRUCTIVE SLEEP APNEA): Primary | ICD-10-CM

## 2020-03-11 NOTE — PROGRESS NOTES
Refill Authorization Note     is requesting a refill authorization.    Brief assessment and rationale for refill: APPROVE: needs appt(ANNUAL)     Medication-related problems identified: Requires appointment    Medication Therapy Plan: TSH-wnl; Needs appt(ANNUAL); Approve 3 more months                              Comments:   Requested Prescriptions   Pending Prescriptions Disp Refills    SYNTHROID 100 mcg tablet [Pharmacy Med Name: SYNTHROID 100 MCG TABLET] 90 tablet 0     Sig: TAKE 1 TABLET BY MOUTH ONCE DAILY       Endocrinology:  Hypothyroid Agents Failed - 3/11/2020  4:23 PM        Failed - Manual Review: FT4 is not required if last TSH is WNL.        Passed - Patient is at least 18 years old        Passed - Office visit in past 12 months or future 90 days.     Recent Outpatient Visits            1 week ago Preventative health care    [facility] BARRIE Morillo    8 months ago Sleep apnea, unspecified type    [id number] Bala Smith NP    1 year ago Gastroesophageal reflux disease without esophagitis    [facility] Jesika Sanders MD    1 year ago Viral URI with cough    [facility] Robel Stewart MD    1 year ago Recurrent major depressive disorder, in partial remission    [facility] Jesika Sanders MD                    Passed - TSH in normal range and within 360 days     TSH   Date Value Ref Range Status   02/07/2020 4.000 0.400 - 4.000 uIU/mL Final     Comment:     Warning:  Heterophilic antibodies in serum or plasma of   certain individuals are known to cause interference with   immunoassays. These antibodies may be present in blood samples   from individuals regularly exposed to animal or who have been   treated with animal products.   Patients taking high doses of supplemental biotin may have  negatively biased results.      08/13/2018 1.410 0.400 - 4.000 uIU/mL Final      Comment:     Warning:  Heterophilic antibodies in serum or plasma of   certain individuals are known to cause interference with   immunoassays. These antibodies may be present in blood samples   from individuals regularly exposed to animal or who have been   treated with animal products.      08/18/2017 1.890 0.400 - 4.000 uIU/mL Final     Comment:     Warning:  Heterophilic antibodies in serum or plasma of   certain individuals are known to cause interference with   immunoassays. These antibodies may be present in blood samples   from individuals regularly exposed to animal or who have been   treated with animal products.                 Passed - T4 free within 1080 days     Free T4   Date Value Ref Range Status   08/18/2017 0.97 0.71 - 1.51 ng/dL Final   07/24/2017 1.08 0.71 - 1.51 ng/dL Final   11/22/2016 1.00 0.71 - 1.51 ng/dL Final               Appointments  past 12m or future 3m with PCP    Date Provider   Last Visit   3/6/2019 Jesika Sanders MD   Next Visit   Visit date not found Jesika Sanders MD   .  ED visits in past 90 days: 1       Note composed:4:32 PM 03/11/2020

## 2020-03-12 RX ORDER — LEVOTHYROXINE SODIUM 100 UG/1
TABLET ORAL
Qty: 90 TABLET | Refills: 0 | Status: SHIPPED | OUTPATIENT
Start: 2020-03-12 | End: 2020-05-06 | Stop reason: SDUPTHER

## 2020-05-05 ENCOUNTER — PATIENT MESSAGE (OUTPATIENT)
Dept: INTERNAL MEDICINE | Facility: CLINIC | Age: 70
End: 2020-05-05

## 2020-05-05 DIAGNOSIS — I10 ESSENTIAL HYPERTENSION: ICD-10-CM

## 2020-05-05 DIAGNOSIS — F33.41 RECURRENT MAJOR DEPRESSIVE DISORDER, IN PARTIAL REMISSION: ICD-10-CM

## 2020-05-06 RX ORDER — FERROUS SULFATE 325(65) MG
325 TABLET ORAL EVERY OTHER DAY
Qty: 45 TABLET | COMMUNITY
Start: 2020-05-06 | End: 2021-09-24

## 2020-05-06 RX ORDER — LOSARTAN POTASSIUM AND HYDROCHLOROTHIAZIDE 12.5; 5 MG/1; MG/1
1 TABLET ORAL EVERY MORNING
Qty: 90 TABLET | Refills: 3 | Status: SHIPPED | OUTPATIENT
Start: 2020-05-06 | End: 2021-01-25 | Stop reason: SDUPTHER

## 2020-05-06 RX ORDER — LEVOCETIRIZINE DIHYDROCHLORIDE 5 MG/1
5 TABLET, FILM COATED ORAL NIGHTLY
Qty: 90 TABLET | Refills: 3 | Status: SHIPPED | OUTPATIENT
Start: 2020-05-06 | End: 2020-11-16 | Stop reason: SDUPTHER

## 2020-05-06 RX ORDER — MELATONIN 5 MG
5 CAPSULE ORAL NIGHTLY
Qty: 90 EACH | Refills: 3 | Status: SHIPPED | OUTPATIENT
Start: 2020-05-06 | End: 2021-09-24

## 2020-05-06 RX ORDER — CITALOPRAM 40 MG/1
40 TABLET, FILM COATED ORAL DAILY
Qty: 90 TABLET | Refills: 3 | Status: SHIPPED | OUTPATIENT
Start: 2020-05-06 | End: 2020-08-12 | Stop reason: SDUPTHER

## 2020-05-06 RX ORDER — LEVOTHYROXINE SODIUM 100 UG/1
100 TABLET ORAL
Qty: 90 TABLET | Refills: 3 | Status: SHIPPED | OUTPATIENT
Start: 2020-05-06 | End: 2020-07-07 | Stop reason: SDUPTHER

## 2020-05-06 RX ORDER — PANTOPRAZOLE SODIUM 40 MG/1
40 TABLET, DELAYED RELEASE ORAL EVERY MORNING
Qty: 90 TABLET | Refills: 3 | Status: SHIPPED | OUTPATIENT
Start: 2020-05-06 | End: 2021-09-24

## 2020-06-09 DIAGNOSIS — T18.2XXA FOREIGN BODY IN STOMACH: Primary | ICD-10-CM

## 2020-06-09 DIAGNOSIS — R10.13 ABDOMINAL PAIN, EPIGASTRIC: ICD-10-CM

## 2020-06-18 ENCOUNTER — HOSPITAL ENCOUNTER (OUTPATIENT)
Dept: RADIOLOGY | Facility: HOSPITAL | Age: 70
Discharge: HOME | End: 2020-06-18
Attending: INTERNAL MEDICINE
Payer: MEDICARE

## 2020-06-18 DIAGNOSIS — R10.13 ABDOMINAL PAIN, EPIGASTRIC: ICD-10-CM

## 2020-06-18 DIAGNOSIS — T18.2XXA FOREIGN BODY IN STOMACH: ICD-10-CM

## 2020-06-18 PROCEDURE — 78264 GASTRIC EMPTYING IMG STUDY: CPT | Mod: TC,HCNC

## 2020-06-18 PROCEDURE — A9541 TC99M SULFUR COLLOID: HCPCS | Mod: HCNC

## 2020-06-18 PROCEDURE — 78264 GASTRIC EMPTYING IMG STUDY: CPT | Mod: 26,HCNC,, | Performed by: RADIOLOGY

## 2020-06-18 PROCEDURE — 78264 NM GASTRIC EMPTYING: ICD-10-PCS | Mod: 26,HCNC,, | Performed by: RADIOLOGY

## 2020-07-07 ENCOUNTER — PATIENT MESSAGE (OUTPATIENT)
Dept: INTERNAL MEDICINE | Facility: CLINIC | Age: 70
End: 2020-07-07

## 2020-07-10 PROBLEM — K31.84 GASTROPARESIS: Status: ACTIVE | Noted: 2020-07-10

## 2020-07-31 DIAGNOSIS — K44.9 GASTROESOPHAGEAL HERNIA: Primary | ICD-10-CM

## 2020-08-06 ENCOUNTER — HOSPITAL ENCOUNTER (OUTPATIENT)
Dept: RADIOLOGY | Facility: HOSPITAL | Age: 70
Discharge: HOME | End: 2020-08-06
Attending: SURGERY
Payer: MEDICARE

## 2020-08-06 DIAGNOSIS — K44.9 GASTROESOPHAGEAL HERNIA: ICD-10-CM

## 2020-08-06 PROCEDURE — 74176 CT ABD & PELVIS W/O CONTRAST: CPT | Mod: TC,HCNC

## 2020-08-06 PROCEDURE — 74176 CT ABDOMEN PELVIS WITHOUT CONTRAST: ICD-10-PCS | Mod: 26,HCNC,, | Performed by: RADIOLOGY

## 2020-08-06 PROCEDURE — 74176 CT ABD & PELVIS W/O CONTRAST: CPT | Mod: 26,HCNC,, | Performed by: RADIOLOGY

## 2020-08-06 PROCEDURE — 25500020 PHARM REV CODE 255: Mod: HCNC | Performed by: SURGERY

## 2020-08-06 RX ADMIN — IOHEXOL 15 ML: 300 INJECTION, SOLUTION INTRAVENOUS at 09:08

## 2020-08-12 DIAGNOSIS — F33.41 RECURRENT MAJOR DEPRESSIVE DISORDER, IN PARTIAL REMISSION: ICD-10-CM

## 2020-08-12 RX ORDER — CITALOPRAM 40 MG/1
40 TABLET, FILM COATED ORAL DAILY
Qty: 90 TABLET | Refills: 3 | Status: SHIPPED | OUTPATIENT
Start: 2020-08-12 | End: 2020-08-13 | Stop reason: SDUPTHER

## 2020-08-12 NOTE — TELEPHONE ENCOUNTER
Care Due:                  Date            Visit Type   Department     Provider  --------------------------------------------------------------------------------                                ESTABLISHED   [facility] INTERNAL  Last Visit: 03-      PATIENT      MEDICINE       ERIK STARK  Next Visit: None Scheduled  None         None Found                                                            Last  Test          Frequency    Reason                     Performed    Due Date  --------------------------------------------------------------------------------    Office Visit  12 months..  citalopram,                03- 02-                             levocetirizine,                             levothyroxine,                             losartan-hydrochlorothiaz                             glenys, pantoprazole........    Cr..........  6 months...  losartan-hydrochlorothiaz  02- 08-                             glenys......................    K...........  6 months...  losartan-hydrochlorothiaz  02- 08-                             glenys......................    Na..........  6 months...  losartan-hydrochlorothiaz  02- 08-                             glenys......................    Powered by [employer]. Reference number: [id number]. 8/12/2020 10:50:14 AM   CDT

## 2020-09-29 ENCOUNTER — PATIENT MESSAGE (OUTPATIENT)
Dept: OTHER | Facility: OTHER | Age: 70
End: 2020-09-29

## 2020-10-24 ENCOUNTER — OFFICE VISIT (OUTPATIENT)
Dept: URGENT CARE | Facility: CLINIC | Age: 70
End: 2020-10-24
Payer: MEDICARE

## 2020-10-24 VITALS
HEART RATE: 76 BPM | RESPIRATION RATE: 16 BRPM | HEIGHT: 62 IN | DIASTOLIC BLOOD PRESSURE: 67 MMHG | BODY MASS INDEX: 30.36 KG/M2 | OXYGEN SATURATION: 96 % | SYSTOLIC BLOOD PRESSURE: 104 MMHG | TEMPERATURE: 98 F | WEIGHT: 165 LBS

## 2020-10-24 DIAGNOSIS — S92.034A CLOSED NONDISPLACED AVULSION FRACTURE OF TUBEROSITY OF RIGHT CALCANEUS, INITIAL ENCOUNTER: ICD-10-CM

## 2020-10-24 DIAGNOSIS — S99.921A RIGHT FOOT INJURY, INITIAL ENCOUNTER: ICD-10-CM

## 2020-10-24 DIAGNOSIS — S92.354A NONDISPLACED FRACTURE OF FIFTH METATARSAL BONE, RIGHT FOOT, INITIAL ENCOUNTER FOR CLOSED FRACTURE: Primary | ICD-10-CM

## 2020-10-24 DIAGNOSIS — S82.64XA NONDISPLACED FRACTURE OF LATERAL MALLEOLUS OF RIGHT FIBULA, INITIAL ENCOUNTER FOR CLOSED FRACTURE: ICD-10-CM

## 2020-10-24 PROCEDURE — 73630 X-RAY EXAM OF FOOT: CPT | Mod: FY,RT,S$GLB, | Performed by: RADIOLOGY

## 2020-10-24 PROCEDURE — 99214 OFFICE O/P EST MOD 30 MIN: CPT | Mod: S$GLB,,, | Performed by: PHYSICIAN ASSISTANT

## 2020-10-24 PROCEDURE — 99214 PR OFFICE/OUTPT VISIT, EST, LEVL IV, 30-39 MIN: ICD-10-PCS | Mod: S$GLB,,, | Performed by: PHYSICIAN ASSISTANT

## 2020-10-24 PROCEDURE — 73610 X-RAY EXAM OF ANKLE: CPT | Mod: FY,RT,S$GLB, | Performed by: RADIOLOGY

## 2020-10-24 PROCEDURE — 73610 XR ANKLE COMPLETE 3 VIEW RIGHT: ICD-10-PCS | Mod: FY,RT,S$GLB, | Performed by: RADIOLOGY

## 2020-10-24 PROCEDURE — 73630 XR FOOT COMPLETE 3 VIEW RIGHT: ICD-10-PCS | Mod: FY,RT,S$GLB, | Performed by: RADIOLOGY

## 2020-10-24 RX ORDER — LEVOTHYROXINE SODIUM 100 UG/1
100 TABLET ORAL
COMMUNITY
End: 2021-01-25 | Stop reason: SDUPTHER

## 2020-10-24 RX ORDER — METOCLOPRAMIDE 10 MG/1
TABLET ORAL
COMMUNITY
Start: 2020-08-07 | End: 2021-09-24

## 2020-10-24 NOTE — PROGRESS NOTES
"Subjective:       Patient ID: Trinity Valenzuela is a 70 y.o. female.    Vitals:  height is 5' 2" (1.575 m) and weight is 74.8 kg (165 lb). Her temporal temperature is 98.1 °F (36.7 °C). Her blood pressure is 104/67 and her pulse is 76. Her respiration is 16 and oxygen saturation is 96%.     Chief Complaint: Ankle Injury (right)    Pt c/o right ankle and foot pain for 2 days.  She states that she was attempting to get up from a chair when her flip flops got caught underneath her and rolled her right ankle.  Associated symptoms include swelling. Pt states she heard a "crack" after rolling her ankle.    Ankle Injury   The incident occurred 2 days ago. The incident occurred at home. The injury mechanism was a fall. The pain is present in the right ankle and right foot. The quality of the pain is described as burning. The pain is at a severity of 0/10. The patient is experiencing no pain. The pain has been improving since onset. Associated symptoms include an inability to bear weight and a loss of motion. Pertinent negatives include no numbness or tingling. She reports no foreign bodies present. The symptoms are aggravated by movement, palpation and weight bearing. She has tried nothing for the symptoms. The treatment provided no relief.       Constitution: Negative for chills, fatigue and fever.   HENT: Negative for facial swelling, facial trauma, congestion and sore throat.    Neck: Negative for neck stiffness and painful lymph nodes.   Cardiovascular: Negative for chest trauma, chest pain and leg swelling.   Eyes: Negative for eye trauma, double vision and blurred vision.   Respiratory: Negative for cough and shortness of breath.    Gastrointestinal: Negative for abdominal trauma, abdominal pain, nausea, vomiting, diarrhea and rectal bleeding.   Genitourinary: Negative for dysuria, frequency, urgency, hematuria, history of kidney stones, missed menses, genital trauma and pelvic pain.   Musculoskeletal: Positive for " pain, trauma, joint pain, joint swelling and abnormal ROM of joint. Negative for muscle cramps and muscle ache.   Skin: Negative for color change, pale, rash, wound, abrasion, laceration, erythema and bruising.   Allergic/Immunologic: Negative for seasonal allergies.   Neurological: Negative for dizziness, history of vertigo, light-headedness, passing out, coordination disturbances, headaches, altered mental status, loss of consciousness and numbness.   Hematologic/Lymphatic: Negative for swollen lymph nodes and history of bleeding disorder.   Psychiatric/Behavioral: Negative for altered mental status, nervous/anxious, sleep disturbance and depression. The patient is not nervous/anxious.        Objective:      Physical Exam   Constitutional: She is oriented to person, place, and time. She appears well-developed.   HENT:   Head: Normocephalic and atraumatic. Head is without abrasion, without contusion and without laceration.   Ears:   Right Ear: External ear normal.   Left Ear: External ear normal.   Nose: Nose normal.   Eyes: Pupils are equal, round, and reactive to light. Conjunctivae, EOM and lids are normal.   Neck: Full passive range of motion without pain and phonation normal. Neck supple.   Cardiovascular: Normal rate, regular rhythm and normal heart sounds.   Pulmonary/Chest: Effort normal. No respiratory distress.   Musculoskeletal:      Right ankle: She exhibits decreased range of motion, swelling and ecchymosis. Tenderness. Lateral malleolus tenderness found.      Right foot: Decreased range of motion. Tenderness, bony tenderness and swelling present.        Feet:    Neurological: She is alert and oriented to person, place, and time. She has intact cranial nerves. Gait abnormal.      Comments: Limping due to pain and swelling of right foot and ankle   Skin: Skin is warm, dry, intact and no rash. Capillary refill takes less than 2 seconds. abrasion, burn, bruising, erythema and ecchymosisPsychiatric: Her  speech is normal and behavior is normal. Judgment and thought content normal.   Nursing note and vitals reviewed.          X-ray Ankle Complete 3 View Right    Result Date: 10/24/2020  EXAMINATION: XR ANKLE COMPLETE 3 VIEW RIGHT CLINICAL HISTORY: Unspecified injury of right foot, initial encounter TECHNIQUE: AP, lateral, and oblique images of the right ankle were performed. COMPARISON: None FINDINGS: There is soft tissue swelling about the lateral aspect of the ankle.  There are tiny avulsion fractures at the tip of the lateral malleolus.  There is also a small avulsion fracture at the dorsal aspect of the anterior process of the calcaneus.  Nondisplaced fracture involving the base of the 5th metatarsal.  Well corticated ossific density adjacent to the plantar aspect of the calcaneus likely a sequela of prior trauma or an accessory ossification.     As above. Electronically signed by: Rosmery Ryan MD Date:    10/24/2020 Time:    16:54    X-ray Foot Complete 3 View Right    Result Date: 10/24/2020  EXAMINATION: XR FOOT COMPLETE 3 VIEW RIGHT CLINICAL HISTORY: . Unspecified injury of right foot, initial encounter TECHNIQUE: AP, lateral, and oblique views of the right foot were performed. COMPARISON: None FINDINGS: There is a nondisplaced avulsion fracture at base of the right 5th toe metatarsal. Soft tissue swelling about the hindfoot and ankle.     Nondisplaced avulsion fracture of the base of the right 5th toe metatarsal. Electronically signed by: Sujit Ignacio MD Date:    10/24/2020 Time:    16:57    Assessment:       1. Nondisplaced fracture of fifth metatarsal bone, right foot, initial encounter for closed fracture    2. Right foot injury, initial encounter    3. Closed nondisplaced avulsion fracture of tuberosity of right calcaneus, initial encounter    4. Nondisplaced fracture of lateral malleolus of right fibula, initial encounter for closed fracture        Plan:         Nondisplaced fracture of fifth  metatarsal bone, right foot, initial encounter for closed fracture  -     AIR CAST WALKER BOOT FOR HOME USE  -     Ambulatory referral/consult to Orthopedics    Right foot injury, initial encounter  -     X-Ray Foot Complete 3 view Right; Future; Expected date: 10/24/2020  -     X-Ray Ankle Complete 3 View Right; Future; Expected date: 10/24/2020  -     AIR CAST WALKER BOOT FOR HOME USE  -     Ambulatory referral/consult to Orthopedics    Closed nondisplaced avulsion fracture of tuberosity of right calcaneus, initial encounter  -     AIR CAST WALKER BOOT FOR HOME USE  -     Ambulatory referral/consult to Orthopedics    Nondisplaced fracture of lateral malleolus of right fibula, initial encounter for closed fracture  -     Ambulatory referral/consult to Orthopedics           Email sent to [employer] due to urgent orthopedics referral.  Pt reports that she has a scooter she can use.  Avoid bearing weight on right foot.    Patient Instructions       Understanding Fifth Metatarsal Fracture    A fifth metatarsal fracture is a type of broken bone in your foot. You have 5 metatarsals. They are the middle bones in your feet, between your toes and your anklebones (tarsals). The fifth metatarsal connects your smallest toe to your ankle. These bones help with arch support and balance.     How to say it  met--TAHR-sal   What causes a fifth metatarsal fracture?  A direct blow to the bone is often the cause of a fracture of the fifth metatarsal. That may happen if you drop a heavy object on your foot or land wrong on your foot or ankle. Twisting activities can also break the bone. Pivoting while playing basketball is one example.  Repeatedly placing too much stress on the bone can also cause a fracture of the fifth metatarsal. This is called a stress fracture. People who do physical activities like dancing or running tend to be more prone to stress fractures.  Symptoms of a fifth metatarsal fracture  Sudden pain along  the outside of your foot is the main symptom. A stress fracture may develop more slowly. You may feel chronic pain for a period of time. Your foot may also swell up and bruise. You may have trouble walking.  Treatment for a fifth metatarsal fracture  Treatment for this type of fracture depends on where the bone is broken and how severe the breakage is. Healing can take up to several months. Treatment may include:  · Cold therapy. Putting ice on the area may reduce swelling and pain, especially in the first few days after injury.  · Elevation. Propping up the foot so it is above the level of your heart may ease swelling.  · Prescription or over-the-counter pain medicines. These help reduce pain and swelling.  · Immobilization. Devices such as a splint, cast, or walking boot can protect the bone and ease pain. They can help keep the bone in place so it heals properly. You may need to avoid putting any weight on the broken bone for a period of time. Severe fractures usually need a longer limit on weight-bearing activities.  · Stretching and strengthening exercises. Certain exercises can help you regain flexibility and strength in your foot.  · Surgery. You usually will not need surgery. But you may need it if the bone is broken into 2 or more pieces and is not aligned (displaced), doesnt heal properly, or takes a long time to heal.  Possible complications of a fifth metatarsal fracture  · The bone doesnt heal correctly  · Acute compartment syndrome. This is when pressure builds up in the muscles of the foot and affects blood flow.     When to call your healthcare provider  Call your healthcare provider right away if you have any of these:  · Fever of 100.4°F (38°C) or higher, or as directed  · Symptoms that dont get better, or get worse  · Numbness or coldness in your foot  · Toe nails that turn blue or grey in color  · New symptoms   Date Last Reviewed: 3/10/2016  © 8433-9558 [employer]. [address] [place] [address]  formerly [facility], [place] [address]. All rights reserved. This information is not intended as a substitute for professional medical care. Always follow your healthcare professional's instructions.      You must understand that you've received an Urgent Care treatment only and that you may be released before all your medical problems are known or treated. You, the patient, will arrange for follow up care as instructed.    Follow up with your PCP or specialty clinic as directed in the next 1-2 weeks if not improved or as needed. You can call [phone] to schedule an appointment with the appropriate provider.    If your condition worsens we recommend that you receive another evaluation at the emergency room immediately or contact your primary medical clinic's after hours call service to discuss your concerns.    Please go to the Emergency Department for any concerns or worsening of condition.

## 2020-10-26 ENCOUNTER — PATIENT MESSAGE (OUTPATIENT)
Dept: INTERNAL MEDICINE | Facility: CLINIC | Age: 70
End: 2020-10-26

## 2020-10-26 DIAGNOSIS — S92.901A CLOSED FRACTURE OF RIGHT FOOT, INITIAL ENCOUNTER: Primary | ICD-10-CM

## 2020-10-27 ENCOUNTER — TELEPHONE (OUTPATIENT)
Dept: URGENT CARE | Facility: CLINIC | Age: 70
End: 2020-10-27

## 2020-10-27 ENCOUNTER — TELEPHONE (OUTPATIENT)
Dept: ADMINISTRATIVE | Facility: OTHER | Age: 70
End: 2020-10-27

## 2020-10-27 ENCOUNTER — TELEPHONE (OUTPATIENT)
Dept: ORTHOPEDICS | Facility: CLINIC | Age: 70
End: 2020-10-27

## 2020-10-27 NOTE — TELEPHONE ENCOUNTER
Ortho Referral: [id number]  LVM requesting return call regarding Ortho appt for closed fracture of R foot per Dr. Sanders/PCP referral.

## 2020-10-27 NOTE — TELEPHONE ENCOUNTER
Left voice message for patient to return call to schedule appointment from referral to Orthopedics.  Caroline SMART [phone]

## 2020-11-04 ENCOUNTER — PATIENT OUTREACH (OUTPATIENT)
Dept: ADMINISTRATIVE | Facility: OTHER | Age: 70
End: 2020-11-04

## 2020-11-04 ENCOUNTER — TELEPHONE (OUTPATIENT)
Dept: ORTHOPEDICS | Facility: CLINIC | Age: 70
End: 2020-11-04

## 2020-11-04 NOTE — TELEPHONE ENCOUNTER
Called and spoke with pt in regards to rescheduling appt to an earlier time. Scheduled pt on 11/05/2020 @ 10:30am. Pt was satisfied with new appt date/time.

## 2020-11-05 ENCOUNTER — HOSPITAL ENCOUNTER (OUTPATIENT)
Dept: RADIOLOGY | Facility: HOSPITAL | Age: 70
Discharge: HOME | End: 2020-11-05
Attending: NURSE PRACTITIONER
Payer: MEDICARE

## 2020-11-05 ENCOUNTER — OFFICE VISIT (OUTPATIENT)
Dept: ORTHOPEDICS | Facility: CLINIC | Age: 70
End: 2020-11-05
Payer: MEDICARE

## 2020-11-05 VITALS
WEIGHT: 171.06 LBS | HEIGHT: 62 IN | DIASTOLIC BLOOD PRESSURE: 80 MMHG | SYSTOLIC BLOOD PRESSURE: 126 MMHG | BODY MASS INDEX: 31.48 KG/M2 | HEART RATE: 77 BPM

## 2020-11-05 DIAGNOSIS — S92.901A CLOSED FRACTURE OF RIGHT FOOT, INITIAL ENCOUNTER: ICD-10-CM

## 2020-11-05 DIAGNOSIS — M79.671 RIGHT FOOT PAIN: Primary | ICD-10-CM

## 2020-11-05 DIAGNOSIS — M79.671 RIGHT FOOT PAIN: ICD-10-CM

## 2020-11-05 PROCEDURE — 3079F PR MOST RECENT DIASTOLIC BLOOD PRESSURE 80-89 MM HG: ICD-10-PCS | Mod: HCNC,CPTII,S$GLB, | Performed by: NURSE PRACTITIONER

## 2020-11-05 PROCEDURE — 1126F AMNT PAIN NOTED NONE PRSNT: CPT | Mod: HCNC,S$GLB,, | Performed by: NURSE PRACTITIONER

## 2020-11-05 PROCEDURE — 1159F PR MEDICATION LIST DOCUMENTED IN MEDICAL RECORD: ICD-10-PCS | Mod: HCNC,S$GLB,, | Performed by: NURSE PRACTITIONER

## 2020-11-05 PROCEDURE — 73630 X-RAY EXAM OF FOOT: CPT | Mod: TC,HCNC,RT

## 2020-11-05 PROCEDURE — 3074F PR MOST RECENT SYSTOLIC BLOOD PRESSURE < 130 MM HG: ICD-10-PCS | Mod: HCNC,CPTII,S$GLB, | Performed by: NURSE PRACTITIONER

## 2020-11-05 PROCEDURE — 1159F MED LIST DOCD IN RCRD: CPT | Mod: HCNC,S$GLB,, | Performed by: NURSE PRACTITIONER

## 2020-11-05 PROCEDURE — 3008F PR BODY MASS INDEX (BMI) DOCUMENTED: ICD-10-PCS | Mod: HCNC,CPTII,S$GLB, | Performed by: NURSE PRACTITIONER

## 2020-11-05 PROCEDURE — 1100F PR PT FALLS ASSESS DOC 2+ FALLS/FALL W/INJURY/YR: ICD-10-PCS | Mod: HCNC,CPTII,S$GLB, | Performed by: NURSE PRACTITIONER

## 2020-11-05 PROCEDURE — 99999 PR PBB SHADOW E&M-EST. PATIENT-LVL III: CPT | Mod: PBBFAC,HCNC,, | Performed by: NURSE PRACTITIONER

## 2020-11-05 PROCEDURE — 1126F PR PAIN SEVERITY QUANTIFIED, NO PAIN PRESENT: ICD-10-PCS | Mod: HCNC,S$GLB,, | Performed by: NURSE PRACTITIONER

## 2020-11-05 PROCEDURE — 99999 PR PBB SHADOW E&M-EST. PATIENT-LVL III: ICD-10-PCS | Mod: PBBFAC,HCNC,, | Performed by: NURSE PRACTITIONER

## 2020-11-05 PROCEDURE — 99204 PR OFFICE/OUTPT VISIT, NEW, LEVL IV, 45-59 MIN: ICD-10-PCS | Mod: HCNC,S$GLB,, | Performed by: NURSE PRACTITIONER

## 2020-11-05 PROCEDURE — 3079F DIAST BP 80-89 MM HG: CPT | Mod: HCNC,CPTII,S$GLB, | Performed by: NURSE PRACTITIONER

## 2020-11-05 PROCEDURE — 73630 X-RAY EXAM OF FOOT: CPT | Mod: 26,HCNC,RT, | Performed by: RADIOLOGY

## 2020-11-05 PROCEDURE — 3288F PR FALLS RISK ASSESSMENT DOCUMENTED: ICD-10-PCS | Mod: HCNC,CPTII,S$GLB, | Performed by: NURSE PRACTITIONER

## 2020-11-05 PROCEDURE — 99204 OFFICE O/P NEW MOD 45 MIN: CPT | Mod: HCNC,S$GLB,, | Performed by: NURSE PRACTITIONER

## 2020-11-05 PROCEDURE — 3074F SYST BP LT 130 MM HG: CPT | Mod: HCNC,CPTII,S$GLB, | Performed by: NURSE PRACTITIONER

## 2020-11-05 PROCEDURE — 73630 XR FOOT COMPLETE 3 VIEW RIGHT: ICD-10-PCS | Mod: 26,HCNC,RT, | Performed by: RADIOLOGY

## 2020-11-05 PROCEDURE — 1100F PTFALLS ASSESS-DOCD GE2>/YR: CPT | Mod: HCNC,CPTII,S$GLB, | Performed by: NURSE PRACTITIONER

## 2020-11-05 PROCEDURE — 3288F FALL RISK ASSESSMENT DOCD: CPT | Mod: HCNC,CPTII,S$GLB, | Performed by: NURSE PRACTITIONER

## 2020-11-05 PROCEDURE — 3008F BODY MASS INDEX DOCD: CPT | Mod: HCNC,CPTII,S$GLB, | Performed by: NURSE PRACTITIONER

## 2020-11-05 NOTE — PROGRESS NOTES
SUBJECTIVE:     Chief Complaint & History of Present Illness:  Trinity Valenzuela is a New 70 y.o. year old female patient here for intermittent right foot/ankle pain which has been present for 2 weeks.  There is a history of injury.  She reports she twisted her ankle when trying to get up from a seated position and her shoe got caught on the chair.  She went to urgent care, x-ray shows she had a 5th distal metatarsal fracture and a tiny avulsion fracture at the tip of her lateral malleolus.  She was placed into a short cam boot and referred to Ortho.    She presents to clinic in her boot.  She reports her pain is tolerable but has noticed an increase of pain intermittently since her injury.  She is using OTC analgesics as needed.  She reports intermittent tingling sensation and denies numbness.  She is able to bear weight in her boot.  Denies fever or chills.      Review of patient's allergies indicates:   Allergen Reactions    Penicillins Anaphylaxis and Itching         Current Outpatient Medications   Medication Sig Dispense Refill    citalopram (CELEXA) 40 MG tablet Take 1 tablet (40 mg total) by mouth once daily. 90 tablet 3    ferrous sulfate (IRON) 325 mg (65 mg iron) Tab tablet Take 1 tablet (325 mg total) by mouth every other day. (Patient not taking: Reported on 10/24/2020) 45 tablet     levocetirizine (XYZAL) 5 MG tablet Take 1 tablet (5 mg total) by mouth every evening. (Patient not taking: Reported on 10/24/2020) 90 tablet 3    levothyroxine (SYNTHROID) 100 MCG tablet Take 1 tablet (100 mcg total) by mouth before breakfast. Brand name Synthroid 90 tablet 3    levothyroxine (SYNTHROID) 100 MCG tablet Take 100 mcg by mouth before breakfast.      losartan-hydrochlorothiazide 50-12.5 mg (HYZAAR) 50-12.5 mg per tablet Take 1 tablet by mouth every morning. 90 tablet 3    melatonin 5 mg Cap Take 5 mg by mouth nightly. 90 each 3    metoclopramide HCl (REGLAN) 10 MG tablet TAKE 1 TABLET BY MOUTH TWICE  DAILY BETWEEN MEALS AS DIRECTED      pantoprazole (PROTONIX) 40 MG tablet Take 1 tablet (40 mg total) by mouth every morning. 90 tablet 3     No current facility-administered medications for this visit.        Past Medical History:   Diagnosis Date    Depression     GERD (gastroesophageal reflux disease)     Hypertension     Thyroid disease        Past Surgical History:   Procedure Laterality Date    ESOPHAGEAL MANOMETRY WITH MEASUREMENT OF IMPEDANCE N/A 10/2/2019    Procedure: MANOMETRY, ESOPHAGUS, WITH IMPEDANCE MEASUREMENT;  Surgeon: Etelvina Urbina MD;  Location: [address]);  Service: Endoscopy;  Laterality: N/A;  Referral from [facility]  9/25 - pt confirmed appt - sm    HERNIA REPAIR      HYSTERECTOMY      NASAL TURBINATE REDUCTION  2012    THYROIDECTOMY      TONSILLECTOMY         Family History   Problem Relation Age of Onset    Hypertension Mother     Hyperlipidemia Mother     Breast cancer Mother     Cancer Mother         esophageal    Diabetes Father     Hypertension Father     Hyperlipidemia Father     Migraines Brother     BRCA 1/2 Daughter          Review of Systems:  ROS:  Constitutional: no fever or chills  Eyes: no visual changes  ENT: no nasal congestion or sore throat  Respiratory: no cough or shortness of breath  Cardiovascular: no chest pain or palpitations  Gastrointestinal: no nausea or vomiting, tolerating diet  Genitourinary: no hematuria or dysuria  Integument/Breast: no rash or pruritis  Hematologic/Lymphatic: no easy bruising or lymphadenopathy  Musculoskeletal: right foot fracture  Neurological: no seizures or tremors  Behavioral/Psych: no auditory or visual hallucinations  Endocrine: no heat or cold intolerance      OBJECTIVE:     PHYSICAL EXAM:  Vital Signs (Most Recent)  Vitals:    11/05/20 1039   BP: 126/80   Pulse: 77          General Appearance: Well nourished, well developed, in no acute distress.  HENT: Normal cephalic, oropharynx pink and  moist  Eyes: PERRLA bilaterally and EOM x 4  Respiratory: Even and unlabored  Skin: Warm and Dry.  Healing bruising noted at 2nd and 3rd toe.  Psychiatric: AAO x 4, Mood & affect are normal.    right  Foot/Ankle    General appearance: no acute distress, alert/oriented x3, appropriate mood and affect, looks stated age and well nourished  The examination was performed out of splint/cast  Skin: healing bruising to toes 2 and 3.  Swelling: minimal  Warmth: no warmth  Tenderness: diffuse  ROM: normal  Strength: normal  Gait: normal  Stability: stable to testing and Cotton test: negative  Crepitus: no  Neurological Exam: normal  Vascular Exam: normal and pulse present    soft tissue swelling noted over the lateral ankle      RADIOGRAPHS:  X-ray of right foot obtained and personally reviewed by me.  She has a non displaced fracture to the distal end of her 5th metatarsal that is unchanged from her prior study on 10-.  No new fractures seen.    ASSESSMENT/PLAN:       ICD-10-CM ICD-9-CM   1. Closed fracture of right foot, initial encounter  S92.901A 825.20       Plan:  -Trinity Valenzuela presents to clinic today with c/c right 5th metatarsal fracture, date of injury 10-  -X-ray as above.  -Recommend RICE therapy.  -Will transition into a post-op shoe for 2 weeks and then can try to transition into a regular shoe.  -Continue weight bear as tolerated and avoid high impact activity.  -I will see her back in 4 weeks, at time repeat x-ray of her right foot.    -She can continue OTC analgesics PRN for pain control.  -Call for questions or concerns.

## 2020-11-05 NOTE — LETTER
November 5, 2020      Jesika Sanders MD  [address]  [facility] [address]           [facility] 5th Fl  [address]LO[address]  [facility] [address]  Phone: [phone]          Patient: Trinity Valenzuela   MR Number: [id number]   YOB: 1950   Date of Visit: 11/5/2020       Dear Dr. Jesika Sanders:    Thank you for referring Trinity Valenzuela to me for evaluation. Attached you will find relevant portions of my assessment and plan of care.    If you have questions, please do not hesitate to call me. I look forward to following Trinity Valenzuela along with you.    Sincerely,    Ilia Cole, BRANDON    Enclosure  CC:  No Recipients    If you would like to receive this communication electronically, please contact [email or web address] or [phone] to request more information on [employer] Link access.    For providers and/or their staff who would like to refer a patient to [facility], please contact us through our one-stop-shop provider referral line, [facility], at [phone].    If you feel you have received this communication in error or would no longer like to receive these types of communications, please e-mail [email or web address]

## 2020-11-05 NOTE — PROGRESS NOTES
Requested updates within Care Everywhere.  Patient's chart was reviewed for overdue BRENDAN topics.  Immunizations reconciled.

## 2020-11-06 ENCOUNTER — OFFICE VISIT (OUTPATIENT)
Dept: URGENT CARE | Facility: CLINIC | Age: 70
End: 2020-11-06
Payer: MEDICARE

## 2020-11-06 ENCOUNTER — PATIENT MESSAGE (OUTPATIENT)
Dept: ADMINISTRATIVE | Facility: OTHER | Age: 70
End: 2020-11-06

## 2020-11-06 ENCOUNTER — NURSE TRIAGE (OUTPATIENT)
Dept: ADMINISTRATIVE | Facility: CLINIC | Age: 70
End: 2020-11-06

## 2020-11-06 ENCOUNTER — TELEPHONE (OUTPATIENT)
Dept: INTERNAL MEDICINE | Facility: CLINIC | Age: 70
End: 2020-11-06

## 2020-11-06 VITALS
WEIGHT: 170 LBS | TEMPERATURE: 96 F | HEART RATE: 70 BPM | SYSTOLIC BLOOD PRESSURE: 125 MMHG | DIASTOLIC BLOOD PRESSURE: 76 MMHG | OXYGEN SATURATION: 97 % | RESPIRATION RATE: 16 BRPM | HEIGHT: 62 IN | BODY MASS INDEX: 31.28 KG/M2

## 2020-11-06 DIAGNOSIS — U07.1 COVID-19: Primary | ICD-10-CM

## 2020-11-06 DIAGNOSIS — R53.83 FATIGUE, UNSPECIFIED TYPE: ICD-10-CM

## 2020-11-06 LAB
CTP QC/QA: YES
SARS-COV-2 RDRP RESP QL NAA+PROBE: POSITIVE

## 2020-11-06 PROCEDURE — U0002 COVID-19 LAB TEST NON-CDC: HCPCS | Mod: QW,S$GLB,, | Performed by: PHYSICIAN ASSISTANT

## 2020-11-06 PROCEDURE — 99214 OFFICE O/P EST MOD 30 MIN: CPT | Mod: S$GLB,,, | Performed by: PHYSICIAN ASSISTANT

## 2020-11-06 PROCEDURE — U0002: ICD-10-PCS | Mod: QW,S$GLB,, | Performed by: PHYSICIAN ASSISTANT

## 2020-11-06 PROCEDURE — 99214 PR OFFICE/OUTPT VISIT, EST, LEVL IV, 30-39 MIN: ICD-10-PCS | Mod: S$GLB,,, | Performed by: PHYSICIAN ASSISTANT

## 2020-11-06 NOTE — TELEPHONE ENCOUNTER
First contact  Contacted patient on behalf of [facility]'s  Covid Surveillance Program Enrollment and Orientation Process.  Patient's identity verified by stating first and last names and .  Welcome Call Completed.  Patient wishes to participate.  Patient has a My Chart portal and able to sign and submit consent.  Patient's daughter is picking up her pulse ox today at [facility].  Patient denies fever.  Patient has a mild non-productive cough.  Denies CP or SOB.  Provided patient with OOC number for non-emergent needs and has access to 911 for emergencies    Called patient to review COVID-19 Surveillance Program enrollment process.    Smartphone: Yes  [facility] tk: Yes    Program consent: Yes  Pulse oximeter status: pending    Verified emergency contacts: Verified    Program Overview: Reviewed , no response process, and importance of correct emergency contacts in event that well-being check is warranted. Patient will call [phone]  to speak with an OnCall nurse, if needed. Pt aware that if Sp02 <94% or if they have any worsening symptoms, they need to go to the emergency department. If they are having a medical emergency, they will call 911. Otherwise, patient will continue to submit data as scheduled. Reviewed importance of wearing mask if self or family members leave the house.     Patient had no further questions.    Sp02: pending    Pulse: pending    Temperature: 96.3    SOB at rest (0-5): 0    SOB with activity (0-5): 0    Worsening symptoms: no    Fever symptoms: no    Increased home oxygen: no

## 2020-11-06 NOTE — PATIENT INSTRUCTIONS
You have tested positive for COVID-19 today.  Per the CDC, you are now in a 10 day isolation.         This isolation starts from the day you first developed symptoms, not the day of your positive test. For example, if your symptoms began on a Monday, and you waited until Friday of the same week to get tested, and it was positive, your 10 day isolation begins from that Monday, not the Friday you tested positive.         However, if you are asymptomatic (a person who does not have any symptoms), and received a COVID-19 test that was positive, your 10 day isolation begins on the day you tested positive.  This is regardless if you were exposed to a known positive days earlier.  So for example, if you test positive as an asymptomatic on day 7 from exposure, you have now extended your 14 day quarantine to a 17 day quarantine.         Also, per the CDC guidelines, once your 10 days have passed, and you have not had fever greater than 100.4F in the last 24 hours without taking any fever reducers such as Tylenol (Acetaminophen) or Motrin (Ibuprofen), you may return to your normal activities including social distancing, wearing masks, and frequent handwashing - YOU DO NOT NEED ANOTHER TEST, OR TO TEST NEGATIVE, IN ORDER TO END YOUR QUARANTINE!    Instructions for Patients with Confirmed or Suspected COVID-19    If you are awaiting your test result, you will either be called or it will be released to the patient portal.  If you have any questions about your test, please visit [email or web address] or call our COVID-19 information line at [phone].      Instructions for non-hospitalized or discharged patients with confirmed or suspected COVID-19:       Stay home except to get medical care.    Separate yourself from other people and animals in your home.    Call ahead before visiting your doctor.    Wear a face mask.    Cover your coughs and sneezes.    Clean your hands often.    Avoid sharing personal  household items.    Clean all high-touch surfaces every day.    Monitor your symptoms. Seek prompt medical attention if your illness is worsening (e.g., difficulty breathing). Before seeking care, call your healthcare provider.    If you have a medical emergency and must call 911, notify the dispatcher that you have or are being evaluated for COVID-19. If possible, put on a face mask before emergency medical services arrive.    Use the following symptom-based strategy to return to normal activity following a suspected or confirmed case of COVID-19. Continue isolation until:   o At least 3 days (72 hours) have passed since recovery defined as resolution of fever without the use of fever-reducing medications and improvement in respiratory symptoms (e.g. cough, shortness of breath), and   o At least 10 days have passed since the first positive test.       As one of the next steps, you will receive a call or text from the [place] Department of Health ([facility]) COVID-19 Tracing Team. See the contact information below so you know not to ignore the health departments call. It is important that you contact them back immediately so they can help.     Contact Tracer Number:  [phone]  Caller ID for most carriers: [facility]    What is contact tracing?   Contact tracing is a process that helps identify everyone who has been in close contact with an infected person. Contact tracers let those people know they may have been exposed and guide them on next steps. Confidentiality is important for everyone; no one will be told who may have exposed them to the virus.   Your involvement is important. The more we know about where and how this virus is spreading, the better chance we have at stopping it from spreading further.  What does exposure mean?   Exposure means you have been within 6 feet for more than 15 minutes with a person who has or had COVID-19.  What kind of questions do the contact tracers ask?   A  contact tracer will confirm your basic contact information including name, address, phone number, and next of kin, as well as asking about any symptoms you may have had. Theyll also ask you how you think you may have gotten sick, such as places where you may have been exposed to the virus, and people you were with. Those names will never be shared with anyone outside of that call, and will only be used to help trace and stop the spread of the virus.   I have privacy concerns. How will the state use my information?   Your privacy about your health is important. All calls are completed using call centers that use the appropriate health privacy protection measures (HIPAA compliance), meaning that your patient information is safe. No one will ever ask you any questions related to immigration status. Your health comes first.   Do I have to participate?   You do not have to participate, but we strongly encourage you to. Contact tracing can help us catch and control new outbreaks as theyre developing to keep your friends and family safe.   What if I dont hear from anyone?   If you dont receive a call within 24 hours, you can call the number above right away to inquire about your status. That line is open from 8:00 am - 8:00 p.m., 7 days a week.  Contact tracing saves lives! Together, we have the power to beat this virus and keep our loved ones and neighbors safe.       Instructions for household members, intimate partners and caregivers in a non-healthcare setting of a patient with confirmed or suspected COVID-19:         Close contacts should monitor their health and call their healthcare provider right away if they develop symptoms suggestive of COVID-19 (e.g., fever, cough, shortness of breath).    Stay home except to get medical care. Separate yourself from other people and animals in the home.   Monitor the patients symptoms. If the patient is getting sicker, call his or her healthcare provider. If the  patient has a medical emergency and you need to call 911, notify the dispatch personnel that the patient has or is being evaluated for COVID-19.    Wear a facemask when around other people such as sharing a room or vehicle and before entering a healthcare provider's office.   Cover coughs and sneezes with a tissue. Throw used tissues in a lined trash can immediately and wash hands.   Clean hands often with soap and water for at least 20 seconds or with an alcohol-based hand , rubbing hands together until they feel dry. Avoid touching your eyes, nose, and mouth with unwashed hands.   Clean all high-touch; surfaces every day, including counters, tabletops, doorknobs, bathroom fixtures, toilets, phones, keyboards, tablets, bedside tables, etc. Use a household cleaning spray or wipe according to label instructions.   Avoid sharing personal household items such as dishes, drinking glasses, cups, towels, bedding, etc. After these items are used, they should be washed thoroughly with soap and water.   Continue isolation until:   At least 3 days (72 hours) have passed since recovery defined as resolution of fever without the use of fever-reducing medications and improvement in respiratory symptoms (e.g. cough, shortness of breath), and    At least 10 days have passed since the patients first positive test.    https://www.cdc.gov/coronavirus/2019-ncov/your-health/index.htm    You must understand that you've received an Urgent Care treatment only and that you may be released before all your medical problems are known or treated. You, the patient, will arrange for follow up care as instructed.    Follow up with your PCP or specialty clinic as directed in the next 1-2 weeks if not improved or as needed. You can call [phone] to schedule an appointment with the appropriate provider.    If your condition worsens we recommend that you receive another evaluation at the emergency room immediately or contact  your primary medical clinic's after hours call service to discuss your concerns.    Please go to the Emergency Department for any concerns or worsening of condition.

## 2020-11-06 NOTE — TELEPHONE ENCOUNTER
Called patient and aware of Covid test result. Per MD advise her of the COVID surveillance program tk that goes on her cell phone to check temperature and pulse ox twice daily.    Patient stated she would like to participate to this program and she will have her daughter or someone to pick it up for her. She says she would like to pick it up at  [facility].

## 2020-11-06 NOTE — TELEPHONE ENCOUNTER
Seen at [facility] today and she tested positive for COVID  Checking the vital signs today everything looked good and she did not have a fever  It looks like her chief complaint was fatigue  The urgent care provider has not had time to put a note in the computer yet  Can you please call the patient?  She is a retired nurse, RN.  Could you tell her about the COVID surveillance program, an tk that goes on  Her cell phone to check temperature and pulse ox twice daily.  Let me know if she would like to participate and I can put  In the order. If she does not have a pulse oximeter then the  program will provide one but she needs to let us know where she would pick it up

## 2020-11-06 NOTE — PROGRESS NOTES
"Subjective:       Patient ID: Trinity Valenzuela is a 70 y.o. female.    Vitals:  height is 5' 2" (1.575 m) and weight is 77.1 kg (170 lb). Her tympanic temperature is 96.3 °F (35.7 °C). Her blood pressure is 125/76 and her pulse is 70. Her respiration is 16 and oxygen saturation is 97%.     Chief Complaint: Fatigue and COVID-19 Concerns    Fatigue  This is a new problem. Episode onset: 1 month. The problem occurs constantly. The problem has been unchanged. Associated symptoms include fatigue. Pertinent negatives include no arthralgias, chest pain, chills, congestion, coughing, fever, headaches, joint swelling, myalgias, nausea, rash, sore throat, vertigo, vomiting or weakness. Nothing aggravates the symptoms. She has tried nothing for the symptoms. The treatment provided no relief.       Constitution: Positive for fatigue. Negative for chills and fever.   HENT: Negative for congestion and sore throat.    Neck: Negative for painful lymph nodes.   Cardiovascular: Negative for chest pain and leg swelling.   Eyes: Negative for double vision and blurred vision.   Respiratory: Negative for cough and shortness of breath.    Gastrointestinal: Negative for nausea, vomiting and diarrhea.   Genitourinary: Negative for dysuria, frequency, urgency and history of kidney stones.   Musculoskeletal: Negative for joint pain, joint swelling, muscle cramps and muscle ache.   Skin: Negative for color change, pale, rash, erythema and bruising.   Allergic/Immunologic: Negative for seasonal allergies.   Neurological: Negative for dizziness, history of vertigo, light-headedness, passing out and headaches.   Hematologic/Lymphatic: Negative for swollen lymph nodes.   Psychiatric/Behavioral: Negative for nervous/anxious, sleep disturbance and depression. The patient is not nervous/anxious.        Objective:      Physical Exam   Constitutional: She is oriented to person, place, and time. She appears well-developed.   HENT:   Head: Normocephalic " and atraumatic. Head is without abrasion, without contusion and without laceration.   Ears:   Right Ear: External ear normal.   Left Ear: External ear normal.   Nose: Nose normal.   Eyes: Pupils are equal, round, and reactive to light. Conjunctivae, EOM and lids are normal.   Neck: Trachea normal, full passive range of motion without pain and phonation normal. Neck supple.   Cardiovascular: Normal rate, regular rhythm and normal heart sounds.   Pulmonary/Chest: Effort normal and breath sounds normal. No stridor. No respiratory distress. She has no decreased breath sounds. She has no wheezes. She has no rhonchi. She has no rales.   Musculoskeletal: Normal range of motion.   Neurological: She is alert and oriented to person, place, and time. She has intact cranial nerves.      Comments: CN's grossly intact   Skin: Skin is warm, dry, intact and no rash. Capillary refill takes less than 2 seconds. abrasion, burn, bruising, erythema and ecchymosisPsychiatric: Her speech is normal and behavior is normal. Judgment and thought content normal.   Nursing note and vitals reviewed.          Results for orders placed or performed in visit on 11/06/20   POCT COVID-19 Rapid Screening   Result Value Ref Range    POC Rapid COVID Positive (A) Negative     Acceptable Yes      Results reviewed with pt.  Assessment:       1. COVID-19    2. Fatigue, unspecified type        Plan:         COVID-19    Fatigue, unspecified type  -     POCT COVID-19 Rapid Screening           Patient Instructions   You have tested positive for COVID-19 today.  Per the CDC, you are now in a 10 day isolation.         This isolation starts from the day you first developed symptoms, not the day of your positive test. For example, if your symptoms began on a Monday, and you waited until Friday of the same week to get tested, and it was positive, your 10 day isolation begins from that Monday, not the Friday you tested positive.         However, if you  are asymptomatic (a person who does not have any symptoms), and received a COVID-19 test that was positive, your 10 day isolation begins on the day you tested positive.  This is regardless if you were exposed to a known positive days earlier.  So for example, if you test positive as an asymptomatic on day 7 from exposure, you have now extended your 14 day quarantine to a 17 day quarantine.         Also, per the CDC guidelines, once your 10 days have passed, and you have not had fever greater than 100.4F in the last 24 hours without taking any fever reducers such as Tylenol (Acetaminophen) or Motrin (Ibuprofen), you may return to your normal activities including social distancing, wearing masks, and frequent handwashing - YOU DO NOT NEED ANOTHER TEST, OR TO TEST NEGATIVE, IN ORDER TO END YOUR QUARANTINE!    Instructions for Patients with Confirmed or Suspected COVID-19    If you are awaiting your test result, you will either be called or it will be released to the patient portal.  If you have any questions about your test, please visit [email or web address] or call our COVID-19 information line at [phone].      Instructions for non-hospitalized or discharged patients with confirmed or suspected COVID-19:       Stay home except to get medical care.    Separate yourself from other people and animals in your home.    Call ahead before visiting your doctor.    Wear a face mask.    Cover your coughs and sneezes.    Clean your hands often.    Avoid sharing personal household items.    Clean all high-touch surfaces every day.    Monitor your symptoms. Seek prompt medical attention if your illness is worsening (e.g., difficulty breathing). Before seeking care, call your healthcare provider.    If you have a medical emergency and must call 911, notify the dispatcher that you have or are being evaluated for COVID-19. If possible, put on a face mask before emergency medical services arrive.    Use the  following symptom-based strategy to return to normal activity following a suspected or confirmed case of COVID-19. Continue isolation until:   o At least 3 days (72 hours) have passed since recovery defined as resolution of fever without the use of fever-reducing medications and improvement in respiratory symptoms (e.g. cough, shortness of breath), and   o At least 10 days have passed since the first positive test.       As one of the next steps, you will receive a call or text from the [place] Department of Health ([facility]) COVID-19 Tracing Team. See the contact information below so you know not to ignore the health departments call. It is important that you contact them back immediately so they can help.     Contact Tracer Number:  [phone]  Caller ID for most carriers: [facility]    What is contact tracing?   Contact tracing is a process that helps identify everyone who has been in close contact with an infected person. Contact tracers let those people know they may have been exposed and guide them on next steps. Confidentiality is important for everyone; no one will be told who may have exposed them to the virus.   Your involvement is important. The more we know about where and how this virus is spreading, the better chance we have at stopping it from spreading further.  What does exposure mean?   Exposure means you have been within 6 feet for more than 15 minutes with a person who has or had COVID-19.  What kind of questions do the contact tracers ask?   A contact tracer will confirm your basic contact information including name, address, phone number, and next of kin, as well as asking about any symptoms you may have had. Theyll also ask you how you think you may have gotten sick, such as places where you may have been exposed to the virus, and people you were with. Those names will never be shared with anyone outside of that call, and will only be used to help trace and stop the spread of the  virus.   I have privacy concerns. How will the state use my information?   Your privacy about your health is important. All calls are completed using call centers that use the appropriate health privacy protection measures (HIPAA compliance), meaning that your patient information is safe. No one will ever ask you any questions related to immigration status. Your health comes first.   Do I have to participate?   You do not have to participate, but we strongly encourage you to. Contact tracing can help us catch and control new outbreaks as theyre developing to keep your friends and family safe.   What if I dont hear from anyone?   If you dont receive a call within 24 hours, you can call the number above right away to inquire about your status. That line is open from 8:00 am - 8:00 p.m., 7 days a week.  Contact tracing saves lives! Together, we have the power to beat this virus and keep our loved ones and neighbors safe.       Instructions for household members, intimate partners and caregivers in a non-healthcare setting of a patient with confirmed or suspected COVID-19:         Close contacts should monitor their health and call their healthcare provider right away if they develop symptoms suggestive of COVID-19 (e.g., fever, cough, shortness of breath).    Stay home except to get medical care. Separate yourself from other people and animals in the home.   Monitor the patients symptoms. If the patient is getting sicker, call his or her healthcare provider. If the patient has a medical emergency and you need to call 911, notify the dispatch personnel that the patient has or is being evaluated for COVID-19.    Wear a facemask when around other people such as sharing a room or vehicle and before entering a healthcare provider's office.   Cover coughs and sneezes with a tissue. Throw used tissues in a lined trash can immediately and wash hands.   Clean hands often with soap and water for at least 20 seconds  or with an alcohol-based hand , rubbing hands together until they feel dry. Avoid touching your eyes, nose, and mouth with unwashed hands.   Clean all high-touch; surfaces every day, including counters, tabletops, doorknobs, bathroom fixtures, toilets, phones, keyboards, tablets, bedside tables, etc. Use a household cleaning spray or wipe according to label instructions.   Avoid sharing personal household items such as dishes, drinking glasses, cups, towels, bedding, etc. After these items are used, they should be washed thoroughly with soap and water.   Continue isolation until:   At least 3 days (72 hours) have passed since recovery defined as resolution of fever without the use of fever-reducing medications and improvement in respiratory symptoms (e.g. cough, shortness of breath), and    At least 10 days have passed since the patients first positive test.    https://www.cdc.gov/coronavirus/2019-ncov/your-health/index.htm    You must understand that you've received an Urgent Care treatment only and that you may be released before all your medical problems are known or treated. You, the patient, will arrange for follow up care as instructed.    Follow up with your PCP or specialty clinic as directed in the next 1-2 weeks if not improved or as needed. You can call [phone] to schedule an appointment with the appropriate provider.    If your condition worsens we recommend that you receive another evaluation at the emergency room immediately or contact your primary medical clinic's after hours call service to discuss your concerns.    Please go to the Emergency Department for any concerns or worsening of condition.

## 2020-11-06 NOTE — TELEPHONE ENCOUNTER
Reason for Disposition   [1] COVID-19 infection diagnosed or suspected AND [2] mild symptoms (fever, cough) AND [3] no trouble breathing or other complications   COVID-19 Home Isolation, questions about    Additional Information   Negative: Severe difficulty breathing (e.g., struggling for each breath, speaks in single words)   Negative: Difficult to awaken or acting confused (e.g., disoriented, slurred speech)   Negative: Bluish (or gray) lips or face now   Negative: Shock suspected (e.g., cold/pale/clammy skin, too weak to stand, low BP, rapid pulse)   Negative: Sounds like a life-threatening emergency to the triager   Negative: [1] COVID-19 suspected (e.g., cough, fever, shortness of breath) AND [2] mild symptoms AND [3] public health department recommends testing   Negative: [1] COVID-19 exposure AND [2] no symptoms   Negative: COVID-19 and Breastfeeding, questions about   Negative: SEVERE or constant chest pain (Exception: mild central chest pain, present only when coughing)   Negative: MODERATE difficulty breathing (e.g., speaks in phrases, SOB even at rest, pulse 100-120)   Negative: Patient sounds very sick or weak to the triager   Negative: MILD difficulty breathing (e.g., minimal/no SOB at rest, SOB with walking, pulse <100)   Negative: Chest pain   Negative: Fever > 103 F (39.4 C)   Negative: [1] Fever > 101 F (38.3 C) AND [2] age > 60   Negative: [1] Fever > 100.0 F (37.8 C) AND [2] bedridden (e.g., nursing home patient, CVA, chronic illness, recovering from surgery)   Negative: HIGH RISK patient (e.g., age > 64 years, diabetes, heart or lung disease, weak immune system)   Negative: Fever present > 3 days (72 hours)   Negative: [1] Fever returns after gone for over 24 hours AND [2] symptoms worse or not improved   Negative: [1] Continuous (nonstop) coughing interferes with work or school AND [2] no improvement using cough treatment per protocol   Negative: Cough present > 3  weeks    Protocols used: CORONAVIRUS (COVID-19) - DIAGNOSED OR [id number]

## 2020-11-07 ENCOUNTER — NURSE TRIAGE (OUTPATIENT)
Dept: ADMINISTRATIVE | Facility: CLINIC | Age: 70
End: 2020-11-07

## 2020-11-07 ENCOUNTER — PATIENT MESSAGE (OUTPATIENT)
Dept: ADMINISTRATIVE | Facility: OTHER | Age: 70
End: 2020-11-07

## 2020-11-07 NOTE — TELEPHONE ENCOUNTER
Patient escalated to Covid Surveillance Program as no response.  Contacted patient  Patient's identity verified by stating first and last names and .  Patient stated her daughter picked up her pulse oximeter and she was just fixing to upload VS now.  Patent stated her symptoms have not worsened since yesterday but she was up most of the night with a headache.  Patient stated she took tylenol which helped.  Patient has OOC number for non-emergent needs and access to 911 for emergencies

## 2020-11-07 NOTE — TELEPHONE ENCOUNTER
Reason for Disposition   [1] COVID-19 infection diagnosed or suspected AND [2] mild symptoms (fever, cough) AND [3] no trouble breathing or other complications   COVID-19 Home Isolation, questions about   COVID-19 Prevention and Healthy Living, questions about    Additional Information   Negative: Severe difficulty breathing (e.g., struggling for each breath, speaks in single words)   Negative: Difficult to awaken or acting confused (e.g., disoriented, slurred speech)   Negative: Bluish (or gray) lips or face now   Negative: Shock suspected (e.g., cold/pale/clammy skin, too weak to stand, low BP, rapid pulse)   Negative: Sounds like a life-threatening emergency to the triager   Negative: [1] COVID-19 suspected (e.g., cough, fever, shortness of breath) AND [2] mild symptoms AND [3] public health department recommends testing   Negative: [1] COVID-19 exposure AND [2] no symptoms   Negative: COVID-19 and Breastfeeding, questions about   Negative: SEVERE or constant chest pain (Exception: mild central chest pain, present only when coughing)   Negative: MODERATE difficulty breathing (e.g., speaks in phrases, SOB even at rest, pulse 100-120)   Negative: Patient sounds very sick or weak to the triager   Negative: MILD difficulty breathing (e.g., minimal/no SOB at rest, SOB with walking, pulse <100)   Negative: Chest pain   Negative: Fever > 103 F (39.4 C)   Negative: [1] Fever > 101 F (38.3 C) AND [2] age > 60   Negative: [1] Fever > 100.0 F (37.8 C) AND [2] bedridden (e.g., nursing home patient, CVA, chronic illness, recovering from surgery)   Negative: HIGH RISK patient (e.g., age > 64 years, diabetes, heart or lung disease, weak immune system)   Negative: Fever present > 3 days (72 hours)   Negative: [1] Fever returns after gone for over 24 hours AND [2] symptoms worse or not improved   Negative: [1] Continuous (nonstop) coughing interferes with work or school AND [2] no improvement using cough  treatment per protocol   Negative: Cough present > 3 weeks    Protocols used: CORONAVIRUS (COVID-19) - DIAGNOSED OR [id number]

## 2020-11-07 NOTE — TELEPHONE ENCOUNTER
Patient escalated due to abnormal vital sign and/or symptom; however, patient did respond to call  pt said that she has a little dry cough and had a h/a last night pt told that the H/A could be part of the disease process as well plenty people that have the virus. Pt took advil and helped pt denies any SOB or CP will continue to monitor and call us back if any problems. Pt said shes doing ok.         Reason for Disposition   [1] COVID-19 infection diagnosed or suspected AND [2] mild symptoms (fever, cough) AND [3] no trouble breathing or other complications    Additional Information   Negative: Severe difficulty breathing (e.g., struggling for each breath, speaks in single words)   Negative: Difficult to awaken or acting confused (e.g., disoriented, slurred speech)   Negative: Bluish (or gray) lips or face now   Negative: Shock suspected (e.g., cold/pale/clammy skin, too weak to stand, low BP, rapid pulse)   Negative: Sounds like a life-threatening emergency to the triager   Negative: [1] COVID-19 suspected (e.g., cough, fever, shortness of breath) AND [2] mild symptoms AND [3] public health department recommends testing   Negative: [1] COVID-19 exposure AND [2] no symptoms   Negative: COVID-19 and Breastfeeding, questions about   Negative: SEVERE or constant chest pain (Exception: mild central chest pain, present only when coughing)   Negative: MODERATE difficulty breathing (e.g., speaks in phrases, SOB even at rest, pulse 100-120)   Negative: Patient sounds very sick or weak to the triager   Negative: MILD difficulty breathing (e.g., minimal/no SOB at rest, SOB with walking, pulse <100)   Negative: Chest pain   Negative: Fever > 103 F (39.4 C)   Negative: [1] Fever > 101 F (38.3 C) AND [2] age > 60   Negative: [1] Fever > 100.0 F (37.8 C) AND [2] bedridden (e.g., nursing home patient, CVA, chronic illness, recovering from surgery)   Negative: HIGH RISK patient (e.g., age > 64 years, diabetes,  heart or lung disease, weak immune system)   Negative: Fever present > 3 days (72 hours)   Negative: [1] Fever returns after gone for over 24 hours AND [2] symptoms worse or not improved   Negative: [1] Continuous (nonstop) coughing interferes with work or school AND [2] no improvement using cough treatment per protocol   Negative: Cough present > 3 weeks    Protocols used: CORONAVIRUS (COVID-19) - DIAGNOSED OR [id number]

## 2020-11-08 ENCOUNTER — PATIENT MESSAGE (OUTPATIENT)
Dept: ADMINISTRATIVE | Facility: OTHER | Age: 70
End: 2020-11-08

## 2020-11-08 ENCOUNTER — NURSE TRIAGE (OUTPATIENT)
Dept: ADMINISTRATIVE | Facility: CLINIC | Age: 70
End: 2020-11-08

## 2020-11-08 NOTE — TELEPHONE ENCOUNTER
"Patient escalated to Covid Surveillance que for abnormal VS and symptoms and SOB at rest and on exertion a "2".  Patient's identity verified by stating first and last names and .   Patient stated her symptoms are not any worse that she is just "tired"   Patient stated she still has a  Mild productive cough with white sputum.  Denies CP. Patient stated she has not had a headache today.  Patient hsa OOC number for non-emergent needs and access to 911 for emergencies  "

## 2020-11-08 NOTE — TELEPHONE ENCOUNTER
Patient escalated due to abnormal vital sign and/or symptom; however, patient did not answer the follow up nurse phone call. Per protocol, each number in chart was tried one time, voicemail left instructing patient to call back. See follow up MyChart message that was sent to patient.    Left message and will send mychart message  Reason for Disposition   Message left on unidentified voice mail.  Phone number verified.    Protocols used: NO CONTACT OR DUPLICATE CONTACT CALL-A-AH

## 2020-11-08 NOTE — TELEPHONE ENCOUNTER
Reason for Disposition   [1] COVID-19 infection diagnosed or suspected AND [2] mild symptoms (fever, cough) AND [3] no trouble breathing or other complications   COVID-19 Home Isolation, questions about   COVID-19 Prevention and Healthy Living, questions about    Additional Information   Negative: Severe difficulty breathing (e.g., struggling for each breath, speaks in single words)   Negative: Difficult to awaken or acting confused (e.g., disoriented, slurred speech)   Negative: Bluish (or gray) lips or face now   Negative: Shock suspected (e.g., cold/pale/clammy skin, too weak to stand, low BP, rapid pulse)   Negative: Sounds like a life-threatening emergency to the triager   Negative: [1] COVID-19 suspected (e.g., cough, fever, shortness of breath) AND [2] mild symptoms AND [3] public health department recommends testing   Negative: [1] COVID-19 exposure AND [2] no symptoms   Negative: COVID-19 and Breastfeeding, questions about   Negative: SEVERE or constant chest pain (Exception: mild central chest pain, present only when coughing)   Negative: MODERATE difficulty breathing (e.g., speaks in phrases, SOB even at rest, pulse 100-120)   Negative: Patient sounds very sick or weak to the triager   Negative: MILD difficulty breathing (e.g., minimal/no SOB at rest, SOB with walking, pulse <100)   Negative: Chest pain   Negative: Fever > 103 F (39.4 C)   Negative: [1] Fever > 101 F (38.3 C) AND [2] age > 60   Negative: [1] Fever > 100.0 F (37.8 C) AND [2] bedridden (e.g., nursing home patient, CVA, chronic illness, recovering from surgery)   Negative: HIGH RISK patient (e.g., age > 64 years, diabetes, heart or lung disease, weak immune system)   Negative: Fever present > 3 days (72 hours)   Negative: [1] Fever returns after gone for over 24 hours AND [2] symptoms worse or not improved   Negative: [1] Continuous (nonstop) coughing interferes with work or school AND [2] no improvement using cough  treatment per protocol   Negative: Cough present > 3 weeks    Protocols used: CORONAVIRUS (COVID-19) - DIAGNOSED OR [id number]

## 2020-11-09 ENCOUNTER — PATIENT MESSAGE (OUTPATIENT)
Dept: ADMINISTRATIVE | Facility: OTHER | Age: 70
End: 2020-11-09

## 2020-11-09 NOTE — TELEPHONE ENCOUNTER
Called patient, and stated that she got her pulse ox/device from pharmacy this weekend and it is working fine. She's stated she doing better and still quarantine at this time.

## 2020-11-10 ENCOUNTER — PATIENT MESSAGE (OUTPATIENT)
Dept: ADMINISTRATIVE | Facility: OTHER | Age: 70
End: 2020-11-10

## 2020-11-11 ENCOUNTER — PATIENT MESSAGE (OUTPATIENT)
Dept: ADMINISTRATIVE | Facility: OTHER | Age: 70
End: 2020-11-11

## 2020-11-12 ENCOUNTER — PATIENT MESSAGE (OUTPATIENT)
Dept: ADMINISTRATIVE | Facility: OTHER | Age: 70
End: 2020-11-12

## 2020-11-12 ENCOUNTER — NURSE TRIAGE (OUTPATIENT)
Dept: ADMINISTRATIVE | Facility: CLINIC | Age: 70
End: 2020-11-12

## 2020-11-12 ENCOUNTER — PATIENT MESSAGE (OUTPATIENT)
Dept: ORTHOPEDICS | Facility: CLINIC | Age: 70
End: 2020-11-12

## 2020-11-12 NOTE — TELEPHONE ENCOUNTER
Pt called since she was no response and she submitted V/S over the phone she would not have escalated if she had put them in. She said that she slept till 10 am. Will continue to monitor    Reason for Disposition   [1] COVID-19 diagnosed by positive lab test AND [2] mild symptoms (e.g., cough, fever, others) AND [3] no complications or SOB    Additional Information   Negative: Severe difficulty breathing (e.g., struggling for each breath, speaks in single words)   Negative: Difficult to awaken or acting confused (e.g., disoriented, slurred speech)   Negative: Bluish (or gray) lips or face now   Negative: Shock suspected (e.g., cold/pale/clammy skin, too weak to stand, low BP, rapid pulse)   Negative: Sounds like a life-threatening emergency to the triager   Negative: [1] COVID-19 exposure AND [2] no symptoms   Negative: COVID-19 and Breastfeeding, questions about   Negative: [1] Adult with possible COVID-19 symptoms AND [2] triager concerned about severity of symptoms or other causes   Negative: SEVERE or constant chest pain or pressure (Exception: mild central chest pain, present only when coughing)   Negative: MODERATE difficulty breathing (e.g., speaks in phrases, SOB even at rest, pulse 100-120)   Negative: MILD difficulty breathing (e.g., minimal/no SOB at rest, SOB with walking, pulse <100)   Negative: Chest pain   Negative: Patient sounds very sick or weak to the triager   Negative: Fever > 103 F (39.4 C)   Negative: [1] Fever > 101 F (38.3 C) AND [2] age > 60   Negative: [1] Fever > 100.0 F (37.8 C) AND [2] bedridden (e.g., nursing home patient, CVA, chronic illness, recovering from surgery)   Negative: HIGH RISK patient (e.g., age > 64 years, diabetes, heart or lung disease, weak immune system) (Exception: has already been evaluated by healthcare provider and has no new or worsening symptoms)   Negative: [1] COVID-19 infection suspected by caller or triager AND [2] mild symptoms (cough,  fever, or others) AND [3] no complications or SOB   Negative: Fever present > 3 days (72 hours)   Negative: [1] Fever returns after gone for over 24 hours AND [2] symptoms worse or not improved   Negative: [1] Continuous (nonstop) coughing interferes with work or school AND [2] no improvement using cough treatment per protocol   Negative: Cough present > 3 weeks    Protocols used: CORONAVIRUS (COVID-19) DIAGNOSED OR [id number]

## 2020-11-13 ENCOUNTER — PATIENT MESSAGE (OUTPATIENT)
Dept: ADMINISTRATIVE | Facility: OTHER | Age: 70
End: 2020-11-13

## 2020-11-14 ENCOUNTER — PATIENT MESSAGE (OUTPATIENT)
Dept: ADMINISTRATIVE | Facility: OTHER | Age: 70
End: 2020-11-14

## 2020-11-15 ENCOUNTER — NURSE TRIAGE (OUTPATIENT)
Dept: ADMINISTRATIVE | Facility: CLINIC | Age: 70
End: 2020-11-15

## 2020-11-15 ENCOUNTER — TELEPHONE (OUTPATIENT)
Dept: HOME HEALTH SERVICES | Facility: CLINIC | Age: 70
End: 2020-11-15

## 2020-11-15 ENCOUNTER — PATIENT MESSAGE (OUTPATIENT)
Dept: ADMINISTRATIVE | Facility: OTHER | Age: 70
End: 2020-11-15

## 2020-11-15 NOTE — TELEPHONE ENCOUNTER
Pt contacted for Surveillance escalation - C/O feeling Sx of fever. Denies any SOB, pt able to speak in full sentences without noted SOB or cough.Demies any N/V/D and reports she is hydrating well. Instructed on use of OTC tylenol for any body aches and elevated temp/Sx of temp. Pt able to verbalize understanding. Info only protocol used and pt advised on home care. Pt instructed to call OOC for worsening of symptoms or health questions.    Reason for Disposition   [1] Follow-up call to recent contact AND [2] information only call, no triage required    Protocols used: INFORMATION ONLY CALL - NO TRIAGE-A-

## 2020-11-15 NOTE — TELEPHONE ENCOUNTER
"Called patient due to RN escalation in COVID Surveillance program. Pt escalated due to O2 sat 93%.    Patient location: Home     Vitals: Sp02 : 93%. P: 64. Temp: 98.6    70 y.o. female with pertinent PMHx HTN, Hypothyroidism, TATUM  with Covid symptoms. Positive Covid screen 11/6/2020. No CXR on chart. Home oxygen: no. COVID-19 Hospitalization History: none. Patient excalated due to O2 sat of 93%. Spoke with patient, patients current O2 sat 97%. Patient denies any SOB or difficulty breathing at this time. Patient reports feeling "sluggish" today. Patient encouraged to drink fluids to keep hydrated. Patient denies any worsening of symptoms. Will continue to monitor patient.     HPI:    ROS: Denies worsening cough, light headedness, chills, diaphoresis, chest pain, abdominal pain, emesis, diarrhea or further symptoms.     Assessment: During phone call, patient appears alert and oriented. Able to speak in full sentences without difficulty. No audible wheezing heard during call.     Plan:    Reviewed with patient the reasons for seeking emergency care. Pt aware that if Sp02 <94% or if they have any worsening symptoms, they need to go to the emergency department. If they are having a medical emergency, they will call 911. Otherwise, patient will continue to submit data as scheduled. Reviewed importance of wearing mask if self or family members leave the house.     Advised next steps: Continue care at home  "

## 2020-11-15 NOTE — TELEPHONE ENCOUNTER
Pt contacted through Covid Surveillance Program for an escalation of vital signs. Pt sat this morning reading 93%. Pt called. States overall she is not feeling as well today. States she feels feverish and sluggish.  Repeat vitals taken:    O2 Sat: 94-95%  HR: 71  T= 98.4  Fever sx: Yes (ahes and sluggish)  Cough/ worsening symp: yes - States she has developed a cough with very thick clear sputum over night)  SOB at rest : 0  SB with activity 0    Denies CP. Speaking in complete sentences without difficulty. No audible wheezes noted. Al ert and oriented. Denies discoloration to face.     Escalated to BARBARA, CHELSEA Garsia for further assessment and directives. Pt advised BARBARA would call her. Verbalized understanding.     CHELSEA Johnson NP secure chatted. Acknowledged and will reach out to pt.     Reason for Disposition   HIGH RISK patient (e.g., age > 64 years, diabetes, heart or lung disease, weak immune system)     Escalated to BARBARA for abn symptoms.    Additional Information   Negative: Severe difficulty breathing (e.g., struggling for each breath, speaks in single words)   Negative: Difficult to awaken or acting confused (e.g., disoriented, slurred speech)   Negative: Bluish (or gray) lips or face now   Negative: Shock suspected (e.g., cold/pale/clammy skin, too weak to stand, low BP, rapid pulse)   Negative: Sounds like a life-threatening emergency to the triager   Negative: SEVERE or constant chest pain (Exception: mild central chest pain, present only when coughing)   Negative: MODERATE difficulty breathing (e.g., speaks in phrases, SOB even at rest, pulse 100-120)   Negative: Patient sounds very sick or weak to the triager   Negative: MILD difficulty breathing (e.g., minimal/no SOB at rest, SOB with walking, pulse <100)   Negative: Chest pain   Negative: Fever > 103 F (39.4 C)   Negative: [1] Fever > 101 F (38.3 C) AND [2] age > 60   Negative: [1] Fever > 100.0 F (37.8 C) AND [2] bedridden (e.g., nursing  home patient, CVA, chronic illness, recovering from surgery)    Protocols used: CORONAVIRUS (COVID-19) - DIAGNOSED OR [id number]

## 2020-11-16 ENCOUNTER — PATIENT MESSAGE (OUTPATIENT)
Dept: ADMINISTRATIVE | Facility: OTHER | Age: 70
End: 2020-11-16

## 2020-11-16 ENCOUNTER — NURSE TRIAGE (OUTPATIENT)
Dept: ADMINISTRATIVE | Facility: CLINIC | Age: 70
End: 2020-11-16

## 2020-11-16 ENCOUNTER — TELEPHONE (OUTPATIENT)
Dept: ADMINISTRATIVE | Facility: CLINIC | Age: 70
End: 2020-11-16

## 2020-11-16 RX ORDER — LEVOCETIRIZINE DIHYDROCHLORIDE 5 MG/1
5 TABLET, FILM COATED ORAL NIGHTLY
Qty: 90 TABLET | Refills: 3 | Status: SHIPPED | OUTPATIENT
Start: 2020-11-16 | End: 2021-09-24

## 2020-11-16 RX ORDER — GUAIFENESIN 600 MG/1
1200 TABLET, EXTENDED RELEASE ORAL 2 TIMES DAILY
Qty: 20 TABLET | Refills: 0 | Status: SHIPPED | OUTPATIENT
Start: 2020-11-16 | End: 2021-09-24

## 2020-11-16 RX ORDER — FLUTICASONE PROPIONATE 50 MCG
2 SPRAY, SUSPENSION (ML) NASAL DAILY
Qty: 16 G | Refills: 1 | Status: SHIPPED | OUTPATIENT
Start: 2020-11-16 | End: 2020-12-16

## 2020-11-16 NOTE — TELEPHONE ENCOUNTER
"Patient escalated to Covid Surveillance que for responding "yes" to fever and worsening symptoms.  Patient's identity verified by stating first and last names and .  Patient stated she checked her temperature at 1501 and it was 98.9 and that is "fever" for her  Patient just spoke with Dr Rebecca Yoder and medications called in for her cough and SOB.  Patient also stated she will start taking a baby aspirin daily because she hasa broken foot.  Patient stated her tongue feels "hot".  Questions answered.  Patient has OOC number for non-emergent needs and access to 911 for emergencies  "

## 2020-11-16 NOTE — TELEPHONE ENCOUNTER
Patient escalated to Covid Surveillance que for worsening of symptoms.  Attempted to contact patient.  No answer.  No contact   Left voice message.  My chart message sent

## 2020-11-16 NOTE — TELEPHONE ENCOUNTER
Called patient due to RN escalation in COVID Surveillance program. Pt escalated due to *slight worsening shortness of breath today     Patient location:  Home in [place]     Vitals: Sp02 : 95 %. P: 61 Temp: AF    70 y.o. female with pertinent PMHx on day 10 of Covid symptoms. Positive Covid screen 11/6 . Home oxygen: no. COVID-19 Hospitalization History: N/A    HPI:  70-year-old nonsmoking female with a past medical history significant for GERD, hypothyroidism, hypertension, restless leg syndrome as well as allergic rhinitis who today is complaining of a little more fatigue than the prior few days and difficulty breathing out of her nose.  She does have a history of allergic rhinitis and has not been on her Xyzal since she been sick with COVID.  Her presenting symptom of COVID was fatigue followed by a nocturnal headache which she also had yesterday evening and hence did not sleep well.    ROS: Denies worsening cough, light headedness, fever, chills, diaphoresis, chest pain, abdominal pain, emesis, diarrhea or further symptoms.     Assessment: Vitals appear WNL. During phone call, patient appears alert and oriented. Able to speak in full sentences without difficulty. No audible wheezing heard during call.    Plan:  1) get back on Xyzal which I will electronically prescribed in addition to Mucinex so as not to cause drying of her sinus pocket.  Will also prescribe Flonase to use concomitantly.  2) she will continue to hydrate with non caffeinated and nonalcoholic fluids as well as checking with the COVID to [place] at least for another day or so and she is on D 10 to 11 of symptoms from positive COVID test.    Reviewed with patient the reasons for seeking emergency care. Pt aware that if Sp02 <94% or if they have any worsening symptoms, they need to go to the emergency department. If they are having a medical emergency, they will call 911. Otherwise, patient will continue to submit data as scheduled. Reviewed  importance of wearing mask if self or family members leave the house.     Advised next steps: Continue care at home

## 2020-11-16 NOTE — TELEPHONE ENCOUNTER
"Patient returned call after 2 attempts to contact patient this am with Covid Surveillance escalations.  Zhane's identity verified by stating first and last names and .  Patient stated she responded "yes" to worsening of symptoms because fot last couple of days she has been experiencing severe headaches at night.  Patient stated tylenol helps the headaches. No history of Migraines.  Patient also stated her SOB is a little worse.  Denies fever or CP. Informed patient I would send her PCP a message about her headaches.  Patient agreed.  Patient has OOC number for non-emergent needs and has access to 911 for emergencies  " no fever/no abdominal distension/no chills/no dysuria/no diarrhea

## 2020-11-16 NOTE — TELEPHONE ENCOUNTER
Reason for Disposition   [1] COVID-19 infection diagnosed or suspected AND [2] mild symptoms (fever, cough) AND [3] no trouble breathing or other complications   COVID-19 Home Isolation, questions about    Additional Information   Negative: Severe difficulty breathing (e.g., struggling for each breath, speaks in single words)   Negative: Difficult to awaken or acting confused (e.g., disoriented, slurred speech)   Negative: Bluish (or gray) lips or face now   Negative: Shock suspected (e.g., cold/pale/clammy skin, too weak to stand, low BP, rapid pulse)   Negative: Sounds like a life-threatening emergency to the triager   Negative: [1] COVID-19 suspected (e.g., cough, fever, shortness of breath) AND [2] mild symptoms AND [3] public health department recommends testing   Negative: [1] COVID-19 exposure AND [2] no symptoms   Negative: COVID-19 and Breastfeeding, questions about   Negative: SEVERE or constant chest pain (Exception: mild central chest pain, present only when coughing)   Negative: MODERATE difficulty breathing (e.g., speaks in phrases, SOB even at rest, pulse 100-120)   Negative: Patient sounds very sick or weak to the triager   Negative: MILD difficulty breathing (e.g., minimal/no SOB at rest, SOB with walking, pulse <100)   Negative: Chest pain   Negative: Fever > 103 F (39.4 C)   Negative: [1] Fever > 101 F (38.3 C) AND [2] age > 60   Negative: [1] Fever > 100.0 F (37.8 C) AND [2] bedridden (e.g., nursing home patient, CVA, chronic illness, recovering from surgery)   Negative: HIGH RISK patient (e.g., age > 64 years, diabetes, heart or lung disease, weak immune system)   Negative: Fever present > 3 days (72 hours)   Negative: [1] Fever returns after gone for over 24 hours AND [2] symptoms worse or not improved   Negative: [1] Continuous (nonstop) coughing interferes with work or school AND [2] no improvement using cough treatment per protocol   Negative: Cough present > 3  weeks    Protocols used: CORONAVIRUS (COVID-19) - DIAGNOSED OR [id number]

## 2020-11-16 NOTE — TELEPHONE ENCOUNTER
Pt returning a missed call to the covid-19 surveillance line, Raine Maria RN.  Messaged Raine, she will call the patient back.  Reason for Disposition   Information only question and nurse able to answer    Additional Information   Negative: Nursing judgment   Negative: Nursing judgment   Negative: Nursing judgment   Negative: Nursing judgment    Protocols used: INFORMATION ONLY CALL - NO TRIAGE-A-OH

## 2020-11-16 NOTE — TELEPHONE ENCOUNTER
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

## 2020-11-17 ENCOUNTER — NURSE TRIAGE (OUTPATIENT)
Dept: ADMINISTRATIVE | Facility: CLINIC | Age: 70
End: 2020-11-17

## 2020-11-17 ENCOUNTER — PATIENT MESSAGE (OUTPATIENT)
Dept: ADMINISTRATIVE | Facility: OTHER | Age: 70
End: 2020-11-17

## 2020-11-17 ENCOUNTER — TELEPHONE (OUTPATIENT)
Dept: ADMINISTRATIVE | Facility: CLINIC | Age: 70
End: 2020-11-17

## 2020-11-17 NOTE — TELEPHONE ENCOUNTER
Called patient due to RN escalation in COVID Surveillance program. Pt escalated due to shortness of breath.    Patient location: [place]     Vitals: Sp02 : 98%. P: 61. Temp: 98.4 F    70 y.o. female with pertinent PMHx of TBI, chronic sinusitis, HTN, hep C (treated), hypothyroidism, chronic diarrhea, gastroparesis, TATUM on day 12 of Covid symptoms. Positive Covid screen 11/6. No recent CXR. Home oxygen: no. COVID-19 Hospitalization History: none ([facility] visit on 11/6).    HPI: Patient with above PMH escalated today for reported mild shortness of breath at rest (1) and with activity (2). States she broke her foot 3 weeks ago and when she first wakes up she feels a little more short of breath. It tends to improve throughout the day and has not worsened in recent days. States she is deconditioned due to a lot of sitting and so it is noticeable. Has a cough productive of white mucous that has not worsened. Taking mucinex and flonase. States her temp has gone up 1 degree and this feels like a fever to her. Denies chills or body aches. Tolerating PO well. No other acute complaints noted.    ROS: Denies worsening cough, light headedness, fever, chills, diaphoresis, chest pain, abdominal pain, emesis, diarrhea or further symptoms.     Assessment: Vitals appear WNL as above. During phone call, patient appears alert and oriented. Able to speak in full sentences without difficulty. Mild hoarse voice noted. No audible wheezing heard during call.    Plan:    Continue supportive care at home. Reviewed with patient the reasons for seeking emergency care. Pt aware that if Sp02 <94% or if they have any worsening symptoms, they need to go to the emergency department. If they are having a medical emergency, they will call 911. Otherwise, patient will continue to submit data as scheduled. Reviewed importance of wearing mask if self or family members leave the house.     Advised next steps: Continue care at home     Will copy PCP on this  encounter.

## 2020-11-17 NOTE — TELEPHONE ENCOUNTER
Pt called and she said that she had fever symptoms but temp 98.7 pt said that she ususally run 97 so she will monitor throughout the day and call us back if any problems O2 sat was 98-97. Pt does say that she feels a little SOB since just waking up but tends to get better through out the day. Will pass to Diego for protocol only. Pt seems to be doing well.    Reason for Disposition   MILD difficulty breathing (e.g., minimal/no SOB at rest, SOB with walking, pulse <100)    Additional Information   Negative: SEVERE difficulty breathing (e.g., struggling for each breath, speaks in single words)   Negative: Difficult to awaken or acting confused (e.g., disoriented, slurred speech)   Negative: Bluish (or gray) lips or face now   Negative: Shock suspected (e.g., cold/pale/clammy skin, too weak to stand, low BP, rapid pulse)   Negative: Sounds like a life-threatening emergency to the triager   Negative: [1] COVID-19 exposure AND [2] NO symptoms   Negative: COVID-19 and Breastfeeding, questions about   Negative: [1] Adult with possible COVID-19 symptoms AND [2] triager concerned about severity of symptoms or other causes   Negative: SEVERE or constant chest pain or pressure (Exception: mild central chest pain, present only when coughing)   Negative: MODERATE difficulty breathing (e.g., speaks in phrases, SOB even at rest, pulse 100-120)   Negative: Patient sounds very sick or weak to the triager    Protocols used: CORONAVIRUS (COVID-19) DIAGNOSED OR [id number]

## 2020-11-18 ENCOUNTER — NURSE TRIAGE (OUTPATIENT)
Dept: ADMINISTRATIVE | Facility: CLINIC | Age: 70
End: 2020-11-18

## 2020-11-18 ENCOUNTER — PATIENT MESSAGE (OUTPATIENT)
Dept: ADMINISTRATIVE | Facility: OTHER | Age: 70
End: 2020-11-18

## 2020-11-18 NOTE — TELEPHONE ENCOUNTER
Pt called for escalation and she said that she had a temp but it was 98.2. Pt said that her temp is ususally 96 so 98 she considers a fever. Pt denies any SOB,or CP at this time states that she does have a little cough but not worse taking her nose spray and mucinex will continue to monitor and call if gets worse.     Reason for Disposition   [1] COVID-19 infection diagnosed or suspected AND [2] mild symptoms (fever, cough) AND [3] no trouble breathing or other complications    Additional Information   Negative: Severe difficulty breathing (e.g., struggling for each breath, speaks in single words)   Negative: Difficult to awaken or acting confused (e.g., disoriented, slurred speech)   Negative: Bluish (or gray) lips or face now   Negative: Shock suspected (e.g., cold/pale/clammy skin, too weak to stand, low BP, rapid pulse)   Negative: Sounds like a life-threatening emergency to the triager   Negative: [1] COVID-19 suspected (e.g., cough, fever, shortness of breath) AND [2] mild symptoms AND [3] public health department recommends testing   Negative: [1] COVID-19 exposure AND [2] no symptoms   Negative: COVID-19 and Breastfeeding, questions about   Negative: SEVERE or constant chest pain (Exception: mild central chest pain, present only when coughing)   Negative: MODERATE difficulty breathing (e.g., speaks in phrases, SOB even at rest, pulse 100-120)   Negative: Patient sounds very sick or weak to the triager   Negative: MILD difficulty breathing (e.g., minimal/no SOB at rest, SOB with walking, pulse <100)   Negative: Chest pain   Negative: Fever > 103 F (39.4 C)   Negative: [1] Fever > 101 F (38.3 C) AND [2] age > 60   Negative: [1] Fever > 100.0 F (37.8 C) AND [2] bedridden (e.g., nursing home patient, CVA, chronic illness, recovering from surgery)   Negative: HIGH RISK patient (e.g., age > 64 years, diabetes, heart or lung disease, weak immune system)   Negative: Fever present > 3 days (72  hours)   Negative: [1] Fever returns after gone for over 24 hours AND [2] symptoms worse or not improved   Negative: [1] Continuous (nonstop) coughing interferes with work or school AND [2] no improvement using cough treatment per protocol   Negative: Cough present > 3 weeks    Protocols used: CORONAVIRUS (COVID-19) - DIAGNOSED OR [id number]

## 2020-11-19 ENCOUNTER — PATIENT MESSAGE (OUTPATIENT)
Dept: ADMINISTRATIVE | Facility: OTHER | Age: 70
End: 2020-11-19

## 2020-11-20 ENCOUNTER — PATIENT MESSAGE (OUTPATIENT)
Dept: ADMINISTRATIVE | Facility: OTHER | Age: 70
End: 2020-11-20

## 2020-12-03 ENCOUNTER — OFFICE VISIT (OUTPATIENT)
Dept: ORTHOPEDICS | Facility: CLINIC | Age: 70
End: 2020-12-03
Payer: MEDICARE

## 2020-12-03 ENCOUNTER — HOSPITAL ENCOUNTER (OUTPATIENT)
Dept: RADIOLOGY | Facility: HOSPITAL | Age: 70
Discharge: HOME | End: 2020-12-03
Attending: NURSE PRACTITIONER
Payer: MEDICARE

## 2020-12-03 VITALS — TEMPERATURE: 98 F

## 2020-12-03 DIAGNOSIS — S92.901A CLOSED FRACTURE OF RIGHT FOOT, INITIAL ENCOUNTER: ICD-10-CM

## 2020-12-03 DIAGNOSIS — S92.354D CLOSED NONDISPLACED FRACTURE OF FIFTH METATARSAL BONE OF RIGHT FOOT WITH ROUTINE HEALING, SUBSEQUENT ENCOUNTER: Primary | ICD-10-CM

## 2020-12-03 PROCEDURE — 1125F AMNT PAIN NOTED PAIN PRSNT: CPT | Mod: HCNC,S$GLB,, | Performed by: NURSE PRACTITIONER

## 2020-12-03 PROCEDURE — 99999 PR PBB SHADOW E&M-EST. PATIENT-LVL III: CPT | Mod: PBBFAC,HCNC,, | Performed by: NURSE PRACTITIONER

## 2020-12-03 PROCEDURE — 1159F MED LIST DOCD IN RCRD: CPT | Mod: HCNC,S$GLB,, | Performed by: NURSE PRACTITIONER

## 2020-12-03 PROCEDURE — 99999 PR PBB SHADOW E&M-EST. PATIENT-LVL III: ICD-10-PCS | Mod: PBBFAC,HCNC,, | Performed by: NURSE PRACTITIONER

## 2020-12-03 PROCEDURE — 1125F PR PAIN SEVERITY QUANTIFIED, PAIN PRESENT: ICD-10-PCS | Mod: HCNC,S$GLB,, | Performed by: NURSE PRACTITIONER

## 2020-12-03 PROCEDURE — 73630 XR FOOT COMPLETE 3 VIEW RIGHT: ICD-10-PCS | Mod: 26,HCNC,RT, | Performed by: RADIOLOGY

## 2020-12-03 PROCEDURE — 73630 X-RAY EXAM OF FOOT: CPT | Mod: 26,HCNC,RT, | Performed by: RADIOLOGY

## 2020-12-03 PROCEDURE — 1159F PR MEDICATION LIST DOCUMENTED IN MEDICAL RECORD: ICD-10-PCS | Mod: HCNC,S$GLB,, | Performed by: NURSE PRACTITIONER

## 2020-12-03 PROCEDURE — 73630 X-RAY EXAM OF FOOT: CPT | Mod: TC,HCNC,RT

## 2020-12-03 PROCEDURE — 99213 PR OFFICE/OUTPT VISIT, EST, LEVL III, 20-29 MIN: ICD-10-PCS | Mod: HCNC,S$GLB,, | Performed by: NURSE PRACTITIONER

## 2020-12-03 PROCEDURE — 99213 OFFICE O/P EST LOW 20 MIN: CPT | Mod: HCNC,S$GLB,, | Performed by: NURSE PRACTITIONER

## 2020-12-03 NOTE — PROGRESS NOTES
SUBJECTIVE:     Chief Complaint & History of Present Illness:  Trinity Valenzuela is a Established 70 y.o. year old female patient here for follow up for her 5th right metatarsal.  She was last seen by me on 11/5/2020 at which time non-operative management was recommended.  She reports she only has pain with walking otherwise she has no pain.  No falls or injuries since last seen.  She reports she was Covid + 2 days after her last appointment but she self quarantine with her son as he was the one who gave to her.      Currently she denies falls or injuries since last seen.  She reports she has not needed anything for pain.  She is in regular tennis shoes currently and ambulating without an assistive device.        Review of patient's allergies indicates:   Allergen Reactions    Penicillins Anaphylaxis and Itching         Current Outpatient Medications   Medication Sig Dispense Refill    citalopram (CELEXA) 40 MG tablet Take 1 tablet (40 mg total) by mouth once daily. 90 tablet 3    levocetirizine (XYZAL) 5 MG tablet Take 1 tablet (5 mg total) by mouth every evening. 90 tablet 3    losartan-hydrochlorothiazide 50-12.5 mg (HYZAAR) 50-12.5 mg per tablet Take 1 tablet by mouth every morning. 90 tablet 3    metoclopramide HCl (REGLAN) 10 MG tablet TAKE 1 TABLET BY MOUTH TWICE DAILY BETWEEN MEALS AS DIRECTED      pulse oximeter (PULSE OXIMETER) device by Apply Externally route 2 (two) times a day. Use twice daily at 8 AM and 3 PM and [facility] as directed. 1 each 0    ferrous sulfate (IRON) 325 mg (65 mg iron) Tab tablet Take 1 tablet (325 mg total) by mouth every other day. (Patient not taking: Reported on 11/6/2020) 45 tablet     fluticasone propionate (FLONASE) 50 mcg/actuation nasal spray 2 sprays (100 mcg total) by Each Nostril route once daily. 16 g 1    guaiFENesin (MUCINEX) 600 mg 12 hr tablet Take 2 tablets (1,200 mg total) by mouth 2 (two) times daily. 20 tablet 0    levothyroxine  (SYNTHROID) 100 MCG tablet Take 1 tablet (100 mcg total) by mouth before breakfast. Brand name Synthroid 90 tablet 3    levothyroxine (SYNTHROID) 100 MCG tablet Take 100 mcg by mouth before breakfast.      melatonin 5 mg Cap Take 5 mg by mouth nightly. 90 each 3    pantoprazole (PROTONIX) 40 MG tablet Take 1 tablet (40 mg total) by mouth every morning. 90 tablet 3     No current facility-administered medications for this visit.        Past Medical History:   Diagnosis Date    Depression     GERD (gastroesophageal reflux disease)     Hypertension     Thyroid disease        Past Surgical History:   Procedure Laterality Date    ESOPHAGEAL MANOMETRY WITH MEASUREMENT OF IMPEDANCE N/A 10/2/2019    Procedure: MANOMETRY, ESOPHAGUS, WITH IMPEDANCE MEASUREMENT;  Surgeon: Etelvina Urbina MD;  Location: [facility] ([address] [place] [address]);  Service: Endoscopy;  Laterality: N/A;  Referral from [facility]  9/25 - pt confirmed appt - sm    HERNIA REPAIR      HYSTERECTOMY      NASAL TURBINATE REDUCTION  2012    THYROIDECTOMY      TONSILLECTOMY         Family History   Problem Relation Age of Onset    Hypertension Mother     Hyperlipidemia Mother     Breast cancer Mother     Cancer Mother         esophageal    Diabetes Father     Hypertension Father     Hyperlipidemia Father     Migraines Brother     BRCA 1/2 Daughter          Review of Systems:  ROS:  Constitutional: no fever or chills  Eyes: no visual changes  ENT: no nasal congestion or sore throat  Respiratory: no cough or shortness of breath  Cardiovascular: no chest pain or palpitations  Gastrointestinal: no nausea or vomiting, tolerating diet  Genitourinary: no hematuria or dysuria  Integument/Breast: no rash or pruritis  Hematologic/Lymphatic: no easy bruising or lymphadenopathy  Musculoskeletal: right foot fracture  Neurological: no seizures or tremors  Behavioral/Psych: no auditory or visual hallucinations  Endocrine: no heat or cold  intolerance      OBJECTIVE:     PHYSICAL EXAM:  Vital Signs (Most Recent)  Vitals:    12/03/20 1029   Temp: 97.7 °F (36.5 °C)          General Appearance: Well nourished, well developed, in no acute distress.  HENT: Normal cephalic, oropharynx pink and moist  Eyes: PERRLA bilaterally and EOM x 4  Respiratory: Even and unlabored  Skin: Warm and Dry.  Healing bruising noted at 2nd and 3rd toe.  Psychiatric: AAO x 4, Mood & affect are normal.    right  Foot/Ankle    General appearance: no acute distress, alert/oriented x3, appropriate mood and affect, looks stated age and well nourished  The examination was performed out of splint/cast  Skin: normal.  Swelling: none  Warmth: no warmth  Tenderness: mild to lateral foot  ROM: normal  Strength: normal  Gait: normal  Stability: stable to testing and Cotton test: negative  Crepitus: no  Neurological Exam: normal  Vascular Exam: normal and pulse present    normal exam, no swelling, tenderness, instability; ligaments intact, full range of motion of all ankle/foot joints      RADIOGRAPHS:  X-ray of right foot obtained and personally reviewed by me.  Her 5th metatarsal fracture is still present, no callus formation is seen and there is mild displacement.  No new fractures seen.    ASSESSMENT/PLAN:       ICD-10-CM ICD-9-CM   1. Closed nondisplaced fracture of fifth metatarsal bone of right foot with routine healing, subsequent encounter  S92.354D V54.19       Plan:  -Trinity Valenzuela presents to clinic today with c/c right 5th metatarsal fracture, date of injury 10-  -X-ray as above.  -Recommend RICE therapy.  -Will refer her to PT with [facility].  -Continue regular shoes and weight bear as tolerated and avoid high impact activity.  -I will see her back in 6 weeks, at time repeat x-ray of her right foot.    -She can continue OTC analgesics PRN for pain control.  -Call for questions or concerns.

## 2020-12-08 PROBLEM — R26.81 UNSTEADY GAIT: Status: ACTIVE | Noted: 2020-12-08

## 2020-12-08 PROBLEM — M25.673 DECREASED ROM OF ANKLE: Status: ACTIVE | Noted: 2020-12-08

## 2021-01-05 NOTE — TELEPHONE ENCOUNTER
----- Message from Geneva Doshi sent at 5/9/2019  2:37 PM CDT -----  Contact: PT Portal Request  Appointment Request From: Trinity Valenzuela    With Provider: Sleep specialist    Preferred Date Range: 5/13/2019 - 5/17/2019    Preferred Times: Any time    Reason for visit: Never feel like I have slept.    Comments:  Test for sleep disorder       [facility]    Hospitalist Progress Note    Assessment & Plan   Stevie Dotson is a 64 year old male admitted on 12/30/2020, found to have bacterial meningitis, possible seizures. Unremarkable PMH.      Hospital Summary: Initially admitted with sepsis secondary to bacterial meningitis, acute infectious encephalopathy.  Later on the day of admission, while undergoing EEG monitoring the patient lost her airway protective reflexes requiring emergent intubation.  On 1/3, the patient went for MRI demonstrating possible purulent material versus less likely hemorrhage. Pt able to be successfully extubated 1/3.  Infectious diseases following for antibiotic recommendations, patient was transferred to hospitalist service on 1/4 for transfer out of the ICU.     Sepsis 2/2 strep pneumo meningitis, improving  Possible seizures, resolved  Encephalopathy/encephalitis secondary to above, improving  CSF cultures grew GPC in pairs and chains along with positive blood cultures for strep pneumo -covered by ceftriaxone twice daily.  Has been on vEEG x 5 days, findings consistent to mod-severe encephalopathy with cortical dysfunction in the right hemisphere, this was significantly improved with sedation stopped, no electroclinical seizures or clear epileptiform discharges during the 5-day monitoring period. Mentation now improving, on 1/4 patient is disoriented to situation.  Today;   Fully oriented, up with therapy  Poor appetite. Not have breakfast  Mild to moderate HA and generalized achiness  Afebrile  Diffuse BUE and BLE weakness    Plan;   - Followed by neurology- now signed off, plan to continue Keppra and will need to see General Neurology ([facility]) in 2-3 months to follow Keppra dosing and likely begin tapering dose at that time, meningitis follow up. Discussed with Critical Care Neurologist.   -discontinue fosphenytoin.  -follow up Gen Neurology in several weeks once out of  ARU  --Every 4 hours neuros, per neuro   --Dexamethasone 10 mg every 6 x 4 days (completed 1/4)  --monitor for seizure activity, discontinue vEEG per neuro   --profound deconditioning, consult PT/OT 1/4  -Abx per ID, will discuss length of therapy and timing of discharge  - will need ARU, PT, OT[  -monitor po intake  -may remove ervin today. Remove rectal tube.      Acute respiratory failure 2/2 strep pneumo meningitis, improving  Required mechanical ventilation from 12/30-1/3.  No significant respiratory complaints or concerns.  Oxygen saturation 99% on 4 L nasal cannula, weaned down to 2L.  occ cough. Lungs clear. No oxygen needs    Plan;   --Encourage aggressive pulmonary hygiene  --Wean O2, goal oxygen saturation greater than 90%     Elevated blood pressure  No formal diagnosis of hypertension, has been intermittently hypertensive while intubated.  BP since extubation 120s/80s - 140s/80s.  --Monitor for today, may need initiation of antihypertensive if persistent      Hypoalbuminemia 2/2 critical illness  Started on postpyloric tube feeds on 1/2, has been tolerating well. Electrolytes stable.  --Continue per nutrition recommendations  --Attempt SLP eval 1/5     Hyperglycemia  No formal history or diagnosis of diabetes mellitus, type II, Hgb A1c 6.2%. Requiring low amounts of sliding scale insulin, covered with 10u Lantus every AM. Hyperglycemia is likely related to steroid use.   --Decrease Lantus 7u every AM x 2, then will likely stop lantus now that dexamethasone completed---> stopped lantus given poor po intake  follow  --Continue sliding scale for now     Normocytic Anemia  Hemoglobin 13.1, MCV 90.  No abnormal bleeding  --monitor           Diet: diet per speech. Nutrition consult    DVT Prophylaxis: Heparin SQ  Ervin Catheter: in place, indication: Strict 1-2 Hour I&O  Code Status: Full Code               Disposition Plan     Expected discharge: 2 - 3 days, recommended to pending therapy evals once adequate  pain management/ tolerating PO medications, mental status at baseline and safe disposition plan/ TCU bed available.  Will likely need ARU per Therapy, RN to contact care coordinator. Will discuss time of discharge with ID    discusssed care plan with RN,pt,     Irwin Lopez MD  Text Page  (7am to 6pm)  Interval History   occ cough. Mild to moderate HA  No new issues  Up with PT  Not eat this am    -Data reviewed today: I reviewed all new labs and imaging results over the last 24 hours. I personally reviewed labs and cx data last 24 hours.       Physical Exam   Temp: 98.2  F (36.8  C) Temp src: Oral BP: 134/82 Pulse: 74   Resp: 20 SpO2: 92 % O2 Device: None (Room air) Oxygen Delivery: 2 LPM  Vitals:    01/03/21 0600 01/04/21 0600 01/05/21 0503   Weight: 115.7 kg (255 lb 1.2 oz) 116 kg (255 lb 11.7 oz) 115.2 kg (254 lb)     Vital Signs with Ranges  Temp:  [98.2  F (36.8  C)-100.8  F (38.2  C)] 98.2  F (36.8  C)  Pulse:  [50-84] 74  Resp:  [15-24] 20  BP: (128-150)/(81-93) 134/82  SpO2:  [92 %-98 %] 92 %  I/O last 3 completed shifts:  In: 1760 [P.O.:1100; I.V.:240; NG/GT:300]  Out: 4250 [Urine:4250]    Constitutional: Up in chair, drinking water, nad  Respiratory: CTAB, breathing easily  Cardiovascular: RRR no r/g/m  GI: soft, nt, nd  Skin/Integumen: no rash or edema  Neuro: fully oriented. Nl speech and mentatino  4/5  bilaterally, antigravity BLE hip flexors, nl dorso/plantar flexion, deltoids antigravity, triceps 3-45, biceps 3/5.   Diffusely symmetrically weak      Medications       sodium chloride 0.9%  500 mL Intravenous Once     cefTRIAXone  2 g Intravenous Q12H     heparin ANTICOAGULANT  5,000 Units Subcutaneous Q8H     insulin aspart  1-6 Units Subcutaneous Q4H     insulin glargine  7 Units Subcutaneous QAM AC     levETIRAcetam  1,000 mg Oral or Feeding Tube BID     pantoprazole  40 mg Oral Daily     sodium chloride (PF)  3 mL Intracatheter Q8H       Data   Recent Labs   Lab 01/05/21  6201  01/04/21  0556 01/03/21  0623 12/30/20  0830 12/30/20  0830   WBC 22.8* 23.9* 19.8*   < > 28.8*   HGB 13.5 13.1* 12.9*   < > 15.7   MCV 89 90 90   < > 93    387 340   < > 329    143 144   < > 138   POTASSIUM 3.6 3.6 4.0   < > 3.8   CHLORIDE 107 111* 113*   < > 104   CO2 27 28 29   < > 29   BUN 30 38* 39*   < > 17   CR 0.81 0.78 0.83   < > 0.89   ANIONGAP 4 4 2*   < > 5   HERNESTO 8.1* 8.0* 8.2*   < > 9.2   GLC 79 145* 175*   < > 224*   ALBUMIN  --  2.0* 1.9*   < > 3.3*   PROTTOTAL  --  6.3* 6.4*   < > 8.3   BILITOTAL  --  0.8 0.7   < > 1.8*   ALKPHOS  --  52 53   < > 78   ALT  --  52 53   < > 70   AST  --  33 25   < > 38   TROPI  --   --   --   --  <0.015    < > = values in this interval not displayed.       Imaging:   No results found for this or any previous visit (from the past 24 hour(s)).

## 2021-01-10 ENCOUNTER — IMMUNIZATION (OUTPATIENT)
Dept: FAMILY MEDICINE | Facility: CLINIC | Age: 71
End: 2021-01-10
Payer: MEDICARE

## 2021-01-10 DIAGNOSIS — Z23 NEED FOR VACCINATION: ICD-10-CM

## 2021-01-10 PROCEDURE — 91300 COVID-19, MRNA, LNP-S, PF, 30 MCG/0.3 ML DOSE VACCINE: CPT | Mod: PBBFAC | Performed by: FAMILY MEDICINE

## 2021-01-12 ENCOUNTER — PATIENT OUTREACH (OUTPATIENT)
Dept: ADMINISTRATIVE | Facility: OTHER | Age: 71
End: 2021-01-12

## 2021-01-12 DIAGNOSIS — Z12.31 ENCOUNTER FOR SCREENING MAMMOGRAM FOR MALIGNANT NEOPLASM OF BREAST: Primary | ICD-10-CM

## 2021-01-14 ENCOUNTER — HOSPITAL ENCOUNTER (OUTPATIENT)
Dept: RADIOLOGY | Facility: HOSPITAL | Age: 71
Discharge: HOME | End: 2021-01-14
Attending: NURSE PRACTITIONER
Payer: MEDICARE

## 2021-01-14 ENCOUNTER — PATIENT MESSAGE (OUTPATIENT)
Dept: INTERNAL MEDICINE | Facility: CLINIC | Age: 71
End: 2021-01-14

## 2021-01-14 ENCOUNTER — OFFICE VISIT (OUTPATIENT)
Dept: ORTHOPEDICS | Facility: CLINIC | Age: 71
End: 2021-01-14
Payer: MEDICARE

## 2021-01-14 VITALS — WEIGHT: 172.38 LBS | BODY MASS INDEX: 31.72 KG/M2 | HEIGHT: 62 IN

## 2021-01-14 DIAGNOSIS — S92.354D CLOSED NONDISPLACED FRACTURE OF FIFTH METATARSAL BONE OF RIGHT FOOT WITH ROUTINE HEALING, SUBSEQUENT ENCOUNTER: Primary | ICD-10-CM

## 2021-01-14 DIAGNOSIS — S92.354D CLOSED NONDISPLACED FRACTURE OF FIFTH METATARSAL BONE OF RIGHT FOOT WITH ROUTINE HEALING, SUBSEQUENT ENCOUNTER: ICD-10-CM

## 2021-01-14 DIAGNOSIS — Z12.31 BREAST CANCER SCREENING BY MAMMOGRAM: Primary | ICD-10-CM

## 2021-01-14 PROCEDURE — 99999 PR PBB SHADOW E&M-EST. PATIENT-LVL III: ICD-10-PCS | Mod: PBBFAC,HCNC,, | Performed by: NURSE PRACTITIONER

## 2021-01-14 PROCEDURE — 1159F MED LIST DOCD IN RCRD: CPT | Mod: HCNC,S$GLB,, | Performed by: NURSE PRACTITIONER

## 2021-01-14 PROCEDURE — 99213 OFFICE O/P EST LOW 20 MIN: CPT | Mod: HCNC,S$GLB,, | Performed by: NURSE PRACTITIONER

## 2021-01-14 PROCEDURE — 73630 X-RAY EXAM OF FOOT: CPT | Mod: TC,HCNC,RT

## 2021-01-14 PROCEDURE — 99213 PR OFFICE/OUTPT VISIT, EST, LEVL III, 20-29 MIN: ICD-10-PCS | Mod: HCNC,S$GLB,, | Performed by: NURSE PRACTITIONER

## 2021-01-14 PROCEDURE — 3288F PR FALLS RISK ASSESSMENT DOCUMENTED: ICD-10-PCS | Mod: HCNC,CPTII,S$GLB, | Performed by: NURSE PRACTITIONER

## 2021-01-14 PROCEDURE — 3008F BODY MASS INDEX DOCD: CPT | Mod: HCNC,CPTII,S$GLB, | Performed by: NURSE PRACTITIONER

## 2021-01-14 PROCEDURE — 73630 XR FOOT COMPLETE 3 VIEW RIGHT: ICD-10-PCS | Mod: 26,HCNC,RT, | Performed by: RADIOLOGY

## 2021-01-14 PROCEDURE — 1126F PR PAIN SEVERITY QUANTIFIED, NO PAIN PRESENT: ICD-10-PCS | Mod: HCNC,S$GLB,, | Performed by: NURSE PRACTITIONER

## 2021-01-14 PROCEDURE — 1101F PR PT FALLS ASSESS DOC 0-1 FALLS W/OUT INJ PAST YR: ICD-10-PCS | Mod: HCNC,CPTII,S$GLB, | Performed by: NURSE PRACTITIONER

## 2021-01-14 PROCEDURE — 3008F PR BODY MASS INDEX (BMI) DOCUMENTED: ICD-10-PCS | Mod: HCNC,CPTII,S$GLB, | Performed by: NURSE PRACTITIONER

## 2021-01-14 PROCEDURE — 1101F PT FALLS ASSESS-DOCD LE1/YR: CPT | Mod: HCNC,CPTII,S$GLB, | Performed by: NURSE PRACTITIONER

## 2021-01-14 PROCEDURE — 1159F PR MEDICATION LIST DOCUMENTED IN MEDICAL RECORD: ICD-10-PCS | Mod: HCNC,S$GLB,, | Performed by: NURSE PRACTITIONER

## 2021-01-14 PROCEDURE — 99999 PR PBB SHADOW E&M-EST. PATIENT-LVL III: CPT | Mod: PBBFAC,HCNC,, | Performed by: NURSE PRACTITIONER

## 2021-01-14 PROCEDURE — 1126F AMNT PAIN NOTED NONE PRSNT: CPT | Mod: HCNC,S$GLB,, | Performed by: NURSE PRACTITIONER

## 2021-01-14 PROCEDURE — 3288F FALL RISK ASSESSMENT DOCD: CPT | Mod: HCNC,CPTII,S$GLB, | Performed by: NURSE PRACTITIONER

## 2021-01-14 PROCEDURE — 73630 X-RAY EXAM OF FOOT: CPT | Mod: 26,HCNC,RT, | Performed by: RADIOLOGY

## 2021-01-22 ENCOUNTER — PATIENT MESSAGE (OUTPATIENT)
Dept: INTERNAL MEDICINE | Facility: CLINIC | Age: 71
End: 2021-01-22

## 2021-01-22 DIAGNOSIS — I10 ESSENTIAL HYPERTENSION: ICD-10-CM

## 2021-01-25 RX ORDER — LOSARTAN POTASSIUM AND HYDROCHLOROTHIAZIDE 12.5; 5 MG/1; MG/1
1 TABLET ORAL EVERY MORNING
Qty: 90 TABLET | Refills: 0 | Status: SHIPPED | OUTPATIENT
Start: 2021-01-25 | End: 2021-05-03

## 2021-01-31 ENCOUNTER — IMMUNIZATION (OUTPATIENT)
Dept: FAMILY MEDICINE | Facility: CLINIC | Age: 71
End: 2021-01-31
Payer: MEDICARE

## 2021-01-31 DIAGNOSIS — Z23 NEED FOR VACCINATION: Primary | ICD-10-CM

## 2021-01-31 PROCEDURE — 91300 COVID-19, MRNA, LNP-S, PF, 30 MCG/0.3 ML DOSE VACCINE: CPT | Mod: PBBFAC | Performed by: FAMILY MEDICINE

## 2021-01-31 PROCEDURE — 0002A COVID-19, MRNA, LNP-S, PF, 30 MCG/0.3 ML DOSE VACCINE: CPT | Mod: PBBFAC | Performed by: FAMILY MEDICINE

## 2021-02-25 ENCOUNTER — PES CALL (OUTPATIENT)
Dept: ADMINISTRATIVE | Facility: CLINIC | Age: 71
End: 2021-02-25

## 2021-02-26 ENCOUNTER — PATIENT MESSAGE (OUTPATIENT)
Dept: INTERNAL MEDICINE | Facility: CLINIC | Age: 71
End: 2021-02-26

## 2021-02-26 DIAGNOSIS — Z78.0 POSTMENOPAUSAL STATUS: Primary | ICD-10-CM

## 2021-03-22 NOTE — PATIENT INSTRUCTIONS
Understanding Fifth Metatarsal Fracture    A fifth metatarsal fracture is a type of broken bone in your foot. You have 5 metatarsals. They are the middle bones in your feet, between your toes and your anklebones (tarsals). The fifth metatarsal connects your smallest toe to your ankle. These bones help with arch support and balance.     How to say it  met--TAHR-sal   What causes a fifth metatarsal fracture?  A direct blow to the bone is often the cause of a fracture of the fifth metatarsal. That may happen if you drop a heavy object on your foot or land wrong on your foot or ankle. Twisting activities can also break the bone. Pivoting while playing basketball is one example.  Repeatedly placing too much stress on the bone can also cause a fracture of the fifth metatarsal. This is called a stress fracture. People who do physical activities like dancing or running tend to be more prone to stress fractures.  Symptoms of a fifth metatarsal fracture  Sudden pain along the outside of your foot is the main symptom. A stress fracture may develop more slowly. You may feel chronic pain for a period of time. Your foot may also swell up and bruise. You may have trouble walking.  Treatment for a fifth metatarsal fracture  Treatment for this type of fracture depends on where the bone is broken and how severe the breakage is. Healing can take up to several months. Treatment may include:  · Cold therapy. Putting ice on the area may reduce swelling and pain, especially in the first few days after injury.  · Elevation. Propping up the foot so it is above the level of your heart may ease swelling.  · Prescription or over-the-counter pain medicines. These help reduce pain and swelling.  · Immobilization. Devices such as a splint, cast, or walking boot can protect the bone and ease pain. They can help keep the bone in place so it heals properly. You may need to avoid putting any weight on the broken bone for a period of time. Severe  fractures usually need a longer limit on weight-bearing activities.  · Stretching and strengthening exercises. Certain exercises can help you regain flexibility and strength in your foot.  · Surgery. You usually will not need surgery. But you may need it if the bone is broken into 2 or more pieces and is not aligned (displaced), doesnt heal properly, or takes a long time to heal.  Possible complications of a fifth metatarsal fracture  · The bone doesnt heal correctly  · Acute compartment syndrome. This is when pressure builds up in the muscles of the foot and affects blood flow.     When to call your healthcare provider  Call your healthcare provider right away if you have any of these:  · Fever of 100.4°F (38°C) or higher, or as directed  · Symptoms that dont get better, or get worse  · Numbness or coldness in your foot  · Toe nails that turn blue or grey in color  · New symptoms   Date Last Reviewed: 3/10/2016  © 7358-8698 [employer]. [address] [place] [address]. All rights reserved. This information is not intended as a substitute for professional medical care. Always follow your healthcare professional's instructions.      You must understand that you've received an Urgent Care treatment only and that you may be released before all your medical problems are known or treated. You, the patient, will arrange for follow up care as instructed.    Follow up with your PCP or specialty clinic as directed in the next 1-2 weeks if not improved or as needed. You can call [phone] to schedule an appointment with the appropriate provider.    If your condition worsens we recommend that you receive another evaluation at the emergency room immediately or contact your primary medical clinic's after hours call service to discuss your concerns.    Please go to the Emergency Department for any concerns or worsening of condition.     There are no Wet Read(s) to document.

## 2021-04-05 ENCOUNTER — PATIENT MESSAGE (OUTPATIENT)
Dept: ADMINISTRATIVE | Facility: HOSPITAL | Age: 71
End: 2021-04-05

## 2021-04-30 ENCOUNTER — PATIENT MESSAGE (OUTPATIENT)
Dept: ADMINISTRATIVE | Facility: HOSPITAL | Age: 71
End: 2021-04-30

## 2021-05-04 ENCOUNTER — PATIENT MESSAGE (OUTPATIENT)
Dept: INTERNAL MEDICINE | Facility: CLINIC | Age: 71
End: 2021-05-04

## 2021-05-14 ENCOUNTER — CLINICAL SUPPORT (OUTPATIENT)
Dept: URGENT CARE | Facility: CLINIC | Age: 71
End: 2021-05-14
Payer: MEDICARE

## 2021-05-14 VITALS — TEMPERATURE: 98 F

## 2021-05-14 DIAGNOSIS — Z11.52 ENCOUNTER FOR SCREENING FOR COVID-19: Primary | ICD-10-CM

## 2021-05-14 LAB
CTP QC/QA: YES
SARS-COV-2 RDRP RESP QL NAA+PROBE: NEGATIVE

## 2021-05-14 PROCEDURE — U0002 COVID-19 LAB TEST NON-CDC: HCPCS | Mod: QW,S$GLB,, | Performed by: NURSE PRACTITIONER

## 2021-05-14 PROCEDURE — U0002: ICD-10-PCS | Mod: QW,S$GLB,, | Performed by: NURSE PRACTITIONER

## 2021-05-18 ENCOUNTER — PATIENT MESSAGE (OUTPATIENT)
Dept: INTERNAL MEDICINE | Facility: CLINIC | Age: 71
End: 2021-05-18

## 2021-05-18 DIAGNOSIS — E03.9 PRIMARY HYPOTHYROIDISM: Primary | ICD-10-CM

## 2021-05-29 ENCOUNTER — PATIENT MESSAGE (OUTPATIENT)
Dept: INTERNAL MEDICINE | Facility: CLINIC | Age: 71
End: 2021-05-29

## 2021-05-29 DIAGNOSIS — E05.80 IATROGENIC HYPERTHYROIDISM: ICD-10-CM

## 2021-05-29 DIAGNOSIS — E03.9 PRIMARY HYPOTHYROIDISM: Primary | ICD-10-CM

## 2021-06-09 ENCOUNTER — PATIENT MESSAGE (OUTPATIENT)
Dept: INTERNAL MEDICINE | Facility: CLINIC | Age: 71
End: 2021-06-09

## 2021-06-09 DIAGNOSIS — E05.80 IATROGENIC HYPERTHYROIDISM: Primary | ICD-10-CM

## 2021-06-17 ENCOUNTER — PATIENT MESSAGE (OUTPATIENT)
Dept: INTERNAL MEDICINE | Facility: CLINIC | Age: 71
End: 2021-06-17

## 2021-06-29 ENCOUNTER — PATIENT MESSAGE (OUTPATIENT)
Dept: INTERNAL MEDICINE | Facility: CLINIC | Age: 71
End: 2021-06-29

## 2021-06-29 DIAGNOSIS — F09 MILD COGNITIVE DISORDER: Primary | ICD-10-CM

## 2021-06-29 DIAGNOSIS — S06.9X9S TRAUMATIC BRAIN INJURY WITH LOSS OF CONSCIOUSNESS, SEQUELA: ICD-10-CM

## 2021-07-12 ENCOUNTER — PATIENT MESSAGE (OUTPATIENT)
Dept: INTERNAL MEDICINE | Facility: CLINIC | Age: 71
End: 2021-07-12

## 2021-07-27 ENCOUNTER — PATIENT MESSAGE (OUTPATIENT)
Dept: INTERNAL MEDICINE | Facility: CLINIC | Age: 71
End: 2021-07-27

## 2021-07-29 ENCOUNTER — TELEPHONE (OUTPATIENT)
Dept: INTERNAL MEDICINE | Facility: CLINIC | Age: 71
End: 2021-07-29

## 2021-07-30 ENCOUNTER — TELEPHONE (OUTPATIENT)
Dept: INTERNAL MEDICINE | Facility: CLINIC | Age: 71
End: 2021-07-30

## 2021-08-13 ENCOUNTER — PATIENT MESSAGE (OUTPATIENT)
Dept: INTERNAL MEDICINE | Facility: CLINIC | Age: 71
End: 2021-08-13

## 2021-08-13 RX ORDER — LEVOTHYROXINE SODIUM 100 UG/1
100 TABLET ORAL
Qty: 90 TABLET | Refills: 3 | Status: SHIPPED | OUTPATIENT
Start: 2021-08-13 | End: 2021-11-18

## 2021-08-23 ENCOUNTER — TELEPHONE (OUTPATIENT)
Dept: INTERNAL MEDICINE | Facility: CLINIC | Age: 71
End: 2021-08-23

## 2021-08-27 ENCOUNTER — TELEPHONE (OUTPATIENT)
Dept: INTERNAL MEDICINE | Facility: CLINIC | Age: 71
End: 2021-08-27

## 2021-09-24 ENCOUNTER — IMMUNIZATION (OUTPATIENT)
Dept: INTERNAL MEDICINE | Facility: CLINIC | Age: 71
End: 2021-09-24
Payer: MEDICARE

## 2021-09-24 ENCOUNTER — OFFICE VISIT (OUTPATIENT)
Dept: INTERNAL MEDICINE | Facility: CLINIC | Age: 71
End: 2021-09-24
Payer: MEDICARE

## 2021-09-24 VITALS
SYSTOLIC BLOOD PRESSURE: 126 MMHG | HEIGHT: 62 IN | HEART RATE: 77 BPM | DIASTOLIC BLOOD PRESSURE: 72 MMHG | WEIGHT: 169.75 LBS | BODY MASS INDEX: 31.24 KG/M2

## 2021-09-24 DIAGNOSIS — Z23 NEEDS FLU SHOT: ICD-10-CM

## 2021-09-24 DIAGNOSIS — R41.3 MEMORY LOSS: ICD-10-CM

## 2021-09-24 DIAGNOSIS — S06.9X9S TRAUMATIC BRAIN INJURY WITH LOSS OF CONSCIOUSNESS, SEQUELA: ICD-10-CM

## 2021-09-24 DIAGNOSIS — Z00.00 ANNUAL PHYSICAL EXAM: Primary | ICD-10-CM

## 2021-09-24 DIAGNOSIS — Z23 NEED FOR VACCINATION: Primary | ICD-10-CM

## 2021-09-24 DIAGNOSIS — G47.33 OSA (OBSTRUCTIVE SLEEP APNEA): ICD-10-CM

## 2021-09-24 DIAGNOSIS — E89.0 S/P TOTAL THYROIDECTOMY: ICD-10-CM

## 2021-09-24 DIAGNOSIS — I10 ESSENTIAL HYPERTENSION: ICD-10-CM

## 2021-09-24 DIAGNOSIS — F33.41 RECURRENT MAJOR DEPRESSIVE DISORDER, IN PARTIAL REMISSION: ICD-10-CM

## 2021-09-24 PROCEDURE — 99999 PR PBB SHADOW E&M-EST. PATIENT-LVL IV: CPT | Mod: PBBFAC,HCNC,, | Performed by: INTERNAL MEDICINE

## 2021-09-24 PROCEDURE — 3074F SYST BP LT 130 MM HG: CPT | Mod: HCNC,CPTII,S$GLB, | Performed by: INTERNAL MEDICINE

## 2021-09-24 PROCEDURE — G0008 FLU VACCINE - QUADRIVALENT - ADJUVANTED: ICD-10-PCS | Mod: HCNC,S$GLB,, | Performed by: INTERNAL MEDICINE

## 2021-09-24 PROCEDURE — 1125F PR PAIN SEVERITY QUANTIFIED, PAIN PRESENT: ICD-10-PCS | Mod: HCNC,CPTII,S$GLB, | Performed by: INTERNAL MEDICINE

## 2021-09-24 PROCEDURE — 99499 RISK ADDL DX/OHS AUDIT: ICD-10-PCS | Mod: HCNC,S$GLB,, | Performed by: INTERNAL MEDICINE

## 2021-09-24 PROCEDURE — 3288F PR FALLS RISK ASSESSMENT DOCUMENTED: ICD-10-PCS | Mod: HCNC,CPTII,S$GLB, | Performed by: INTERNAL MEDICINE

## 2021-09-24 PROCEDURE — 1101F PT FALLS ASSESS-DOCD LE1/YR: CPT | Mod: HCNC,CPTII,S$GLB, | Performed by: INTERNAL MEDICINE

## 2021-09-24 PROCEDURE — 99397 PR PREVENTIVE VISIT,EST,65 & OVER: ICD-10-PCS | Mod: 25,HCNC,S$GLB, | Performed by: INTERNAL MEDICINE

## 2021-09-24 PROCEDURE — 3078F DIAST BP <80 MM HG: CPT | Mod: HCNC,CPTII,S$GLB, | Performed by: INTERNAL MEDICINE

## 2021-09-24 PROCEDURE — 90694 VACC AIIV4 NO PRSRV 0.5ML IM: CPT | Mod: HCNC,S$GLB,, | Performed by: INTERNAL MEDICINE

## 2021-09-24 PROCEDURE — 3288F FALL RISK ASSESSMENT DOCD: CPT | Mod: HCNC,CPTII,S$GLB, | Performed by: INTERNAL MEDICINE

## 2021-09-24 PROCEDURE — 3078F PR MOST RECENT DIASTOLIC BLOOD PRESSURE < 80 MM HG: ICD-10-PCS | Mod: HCNC,CPTII,S$GLB, | Performed by: INTERNAL MEDICINE

## 2021-09-24 PROCEDURE — 0003A COVID-19, MRNA, LNP-S, PF, 30 MCG/0.3 ML DOSE VACCINE: CPT | Mod: HCNC,PBBFAC,CV19 | Performed by: INTERNAL MEDICINE

## 2021-09-24 PROCEDURE — 90694 FLU VACCINE - QUADRIVALENT - ADJUVANTED: ICD-10-PCS | Mod: HCNC,S$GLB,, | Performed by: INTERNAL MEDICINE

## 2021-09-24 PROCEDURE — 91300 COVID-19, MRNA, LNP-S, PF, 30 MCG/0.3 ML DOSE VACCINE: CPT | Mod: HCNC,PBBFAC | Performed by: INTERNAL MEDICINE

## 2021-09-24 PROCEDURE — G0008 ADMIN INFLUENZA VIRUS VAC: HCPCS | Mod: HCNC,S$GLB,, | Performed by: INTERNAL MEDICINE

## 2021-09-24 PROCEDURE — 3008F PR BODY MASS INDEX (BMI) DOCUMENTED: ICD-10-PCS | Mod: HCNC,CPTII,S$GLB, | Performed by: INTERNAL MEDICINE

## 2021-09-24 PROCEDURE — 99999 PR PBB SHADOW E&M-EST. PATIENT-LVL IV: ICD-10-PCS | Mod: PBBFAC,HCNC,, | Performed by: INTERNAL MEDICINE

## 2021-09-24 PROCEDURE — 99397 PER PM REEVAL EST PAT 65+ YR: CPT | Mod: 25,HCNC,S$GLB, | Performed by: INTERNAL MEDICINE

## 2021-09-24 PROCEDURE — 3008F BODY MASS INDEX DOCD: CPT | Mod: HCNC,CPTII,S$GLB, | Performed by: INTERNAL MEDICINE

## 2021-09-24 PROCEDURE — 99499 UNLISTED E&M SERVICE: CPT | Mod: HCNC,S$GLB,, | Performed by: INTERNAL MEDICINE

## 2021-09-24 PROCEDURE — 3074F PR MOST RECENT SYSTOLIC BLOOD PRESSURE < 130 MM HG: ICD-10-PCS | Mod: HCNC,CPTII,S$GLB, | Performed by: INTERNAL MEDICINE

## 2021-09-24 PROCEDURE — 1125F AMNT PAIN NOTED PAIN PRSNT: CPT | Mod: HCNC,CPTII,S$GLB, | Performed by: INTERNAL MEDICINE

## 2021-09-24 PROCEDURE — 1101F PR PT FALLS ASSESS DOC 0-1 FALLS W/OUT INJ PAST YR: ICD-10-PCS | Mod: HCNC,CPTII,S$GLB, | Performed by: INTERNAL MEDICINE

## 2021-09-24 RX ORDER — LOSARTAN POTASSIUM AND HYDROCHLOROTHIAZIDE 12.5; 5 MG/1; MG/1
1 TABLET ORAL EVERY MORNING
Qty: 90 TABLET | Refills: 1 | Status: SHIPPED | OUTPATIENT
Start: 2021-09-24 | End: 2022-02-16

## 2021-09-24 RX ORDER — CITALOPRAM 40 MG/1
40 TABLET, FILM COATED ORAL DAILY
Qty: 90 TABLET | Refills: 3 | Status: SHIPPED | OUTPATIENT
Start: 2021-09-24

## 2021-09-24 RX ORDER — DONEPEZIL HYDROCHLORIDE 5 MG/1
5 TABLET, FILM COATED ORAL NIGHTLY
Qty: 30 TABLET | Refills: 11 | Status: SHIPPED | OUTPATIENT
Start: 2021-09-24 | End: 2023-08-28

## 2021-09-28 ENCOUNTER — OFFICE VISIT (OUTPATIENT)
Dept: SLEEP MEDICINE | Facility: CLINIC | Age: 71
End: 2021-09-28
Payer: MEDICARE

## 2021-09-28 DIAGNOSIS — S06.9X9S TRAUMATIC BRAIN INJURY WITH LOSS OF CONSCIOUSNESS, SEQUELA: ICD-10-CM

## 2021-09-28 DIAGNOSIS — G47.33 OSA (OBSTRUCTIVE SLEEP APNEA): Primary | ICD-10-CM

## 2021-09-28 DIAGNOSIS — F33.41 RECURRENT MAJOR DEPRESSIVE DISORDER, IN PARTIAL REMISSION: ICD-10-CM

## 2021-09-28 DIAGNOSIS — F41.9 ANXIETY DISORDER, UNSPECIFIED TYPE: ICD-10-CM

## 2021-09-28 DIAGNOSIS — R06.89 HYPERCARBIA: ICD-10-CM

## 2021-09-28 DIAGNOSIS — Z01.818 PRE-OP TESTING: ICD-10-CM

## 2021-09-28 DIAGNOSIS — K21.9 GASTROESOPHAGEAL REFLUX DISEASE WITHOUT ESOPHAGITIS: ICD-10-CM

## 2021-09-28 PROCEDURE — 1160F RVW MEDS BY RX/DR IN RCRD: CPT | Mod: HCNC,CPTII,95, | Performed by: INTERNAL MEDICINE

## 2021-09-28 PROCEDURE — 1159F PR MEDICATION LIST DOCUMENTED IN MEDICAL RECORD: ICD-10-PCS | Mod: HCNC,CPTII,95, | Performed by: INTERNAL MEDICINE

## 2021-09-28 PROCEDURE — 99212 OFFICE O/P EST SF 10 MIN: CPT | Mod: HCNC,95,, | Performed by: INTERNAL MEDICINE

## 2021-09-28 PROCEDURE — 1160F PR REVIEW ALL MEDS BY PRESCRIBER/CLIN PHARMACIST DOCUMENTED: ICD-10-PCS | Mod: HCNC,CPTII,95, | Performed by: INTERNAL MEDICINE

## 2021-09-28 PROCEDURE — 99499 UNLISTED E&M SERVICE: CPT | Mod: HCNC,95,, | Performed by: INTERNAL MEDICINE

## 2021-09-28 PROCEDURE — 1159F MED LIST DOCD IN RCRD: CPT | Mod: HCNC,CPTII,95, | Performed by: INTERNAL MEDICINE

## 2021-09-28 PROCEDURE — 99212 PR OFFICE/OUTPT VISIT, EST, LEVL II, 10-19 MIN: ICD-10-PCS | Mod: HCNC,95,, | Performed by: INTERNAL MEDICINE

## 2021-09-28 PROCEDURE — 99499 RISK ADDL DX/OHS AUDIT: ICD-10-PCS | Mod: HCNC,95,, | Performed by: INTERNAL MEDICINE

## 2021-09-30 ENCOUNTER — TELEPHONE (OUTPATIENT)
Dept: SLEEP MEDICINE | Facility: OTHER | Age: 71
End: 2021-09-30

## 2021-10-01 ENCOUNTER — TELEPHONE (OUTPATIENT)
Dept: SLEEP MEDICINE | Facility: OTHER | Age: 71
End: 2021-10-01

## 2021-10-06 ENCOUNTER — LAB VISIT (OUTPATIENT)
Dept: SPORTS MEDICINE | Facility: CLINIC | Age: 71
End: 2021-10-06
Payer: MEDICARE

## 2021-10-06 DIAGNOSIS — Z01.818 PRE-OP TESTING: ICD-10-CM

## 2021-10-06 PROCEDURE — U0005 INFEC AGEN DETEC AMPLI PROBE: HCPCS | Performed by: INTERNAL MEDICINE

## 2021-10-06 PROCEDURE — U0003 INFECTIOUS AGENT DETECTION BY NUCLEIC ACID (DNA OR RNA); SEVERE ACUTE RESPIRATORY SYNDROME CORONAVIRUS 2 (SARS-COV-2) (CORONAVIRUS DISEASE [COVID-19]), AMPLIFIED PROBE TECHNIQUE, MAKING USE OF HIGH THROUGHPUT TECHNOLOGIES AS DESCRIBED BY CMS-2020-01-R: HCPCS | Mod: HCNC | Performed by: INTERNAL MEDICINE

## 2021-10-07 LAB
SARS-COV-2 RNA RESP QL NAA+PROBE: NOT DETECTED
SARS-COV-2- CYCLE NUMBER: NORMAL

## 2021-10-09 ENCOUNTER — HOSPITAL ENCOUNTER (OUTPATIENT)
Dept: SLEEP MEDICINE | Facility: OTHER | Age: 71
Discharge: HOME | End: 2021-10-09
Attending: INTERNAL MEDICINE
Payer: MEDICARE

## 2021-10-09 DIAGNOSIS — G47.33 OSA (OBSTRUCTIVE SLEEP APNEA): ICD-10-CM

## 2021-10-09 DIAGNOSIS — F41.9 ANXIETY DISORDER, UNSPECIFIED TYPE: ICD-10-CM

## 2021-10-09 DIAGNOSIS — K21.9 GASTROESOPHAGEAL REFLUX DISEASE WITHOUT ESOPHAGITIS: ICD-10-CM

## 2021-10-09 DIAGNOSIS — R06.89 HYPERCARBIA: ICD-10-CM

## 2021-10-09 DIAGNOSIS — G47.61 PERIODIC LIMB MOVEMENT DISORDER (PLMD): Primary | ICD-10-CM

## 2021-10-09 DIAGNOSIS — F33.41 RECURRENT MAJOR DEPRESSIVE DISORDER, IN PARTIAL REMISSION: ICD-10-CM

## 2021-10-09 DIAGNOSIS — S06.9X9S TRAUMATIC BRAIN INJURY WITH LOSS OF CONSCIOUSNESS, SEQUELA: ICD-10-CM

## 2021-10-09 PROCEDURE — 95810 PR POLYSOMNOGRAPHY, 4 OR MORE: ICD-10-PCS | Mod: 26,HCNC,, | Performed by: INTERNAL MEDICINE

## 2021-10-09 PROCEDURE — 95810 POLYSOM 6/> YRS 4/> PARAM: CPT | Mod: 26,HCNC,, | Performed by: INTERNAL MEDICINE

## 2021-10-09 PROCEDURE — 95810 POLYSOM 6/> YRS 4/> PARAM: CPT | Mod: HCNC

## 2021-10-14 ENCOUNTER — PATIENT OUTREACH (OUTPATIENT)
Dept: ADMINISTRATIVE | Facility: HOSPITAL | Age: 71
End: 2021-10-14

## 2021-10-18 ENCOUNTER — TELEPHONE (OUTPATIENT)
Dept: INTERNAL MEDICINE | Facility: CLINIC | Age: 71
End: 2021-10-18

## 2021-10-18 DIAGNOSIS — E89.0 S/P TOTAL THYROIDECTOMY: Primary | ICD-10-CM

## 2021-10-25 ENCOUNTER — PATIENT MESSAGE (OUTPATIENT)
Dept: SLEEP MEDICINE | Facility: CLINIC | Age: 71
End: 2021-10-25
Payer: MEDICARE

## 2021-11-05 ENCOUNTER — TELEPHONE (OUTPATIENT)
Dept: NEUROLOGY | Facility: CLINIC | Age: 71
End: 2021-11-05
Payer: MEDICARE

## 2021-11-05 ENCOUNTER — PATIENT MESSAGE (OUTPATIENT)
Dept: NEUROLOGY | Facility: CLINIC | Age: 71
End: 2021-11-05
Payer: MEDICARE

## 2021-11-15 ENCOUNTER — TELEPHONE (OUTPATIENT)
Dept: NEUROLOGY | Facility: CLINIC | Age: 71
End: 2021-11-15
Payer: MEDICARE

## 2021-11-15 ENCOUNTER — PATIENT OUTREACH (OUTPATIENT)
Dept: ADMINISTRATIVE | Facility: OTHER | Age: 71
End: 2021-11-15
Payer: MEDICARE

## 2021-11-16 ENCOUNTER — PATIENT MESSAGE (OUTPATIENT)
Dept: SLEEP MEDICINE | Facility: CLINIC | Age: 71
End: 2021-11-16
Payer: MEDICARE

## 2021-11-16 ENCOUNTER — TELEPHONE (OUTPATIENT)
Dept: SLEEP MEDICINE | Facility: CLINIC | Age: 71
End: 2021-11-16
Payer: MEDICARE

## 2021-11-18 ENCOUNTER — OFFICE VISIT (OUTPATIENT)
Dept: SLEEP MEDICINE | Facility: CLINIC | Age: 71
End: 2021-11-18
Payer: MEDICARE

## 2021-11-18 DIAGNOSIS — K21.9 GASTROESOPHAGEAL REFLUX DISEASE WITHOUT ESOPHAGITIS: ICD-10-CM

## 2021-11-18 DIAGNOSIS — F41.9 ANXIETY DISORDER, UNSPECIFIED TYPE: ICD-10-CM

## 2021-11-18 DIAGNOSIS — S06.9X9S TRAUMATIC BRAIN INJURY WITH LOSS OF CONSCIOUSNESS, SEQUELA: ICD-10-CM

## 2021-11-18 DIAGNOSIS — G47.61 PERIODIC LIMB MOVEMENT DISORDER (PLMD): ICD-10-CM

## 2021-11-18 DIAGNOSIS — R06.89 HYPERCARBIA: ICD-10-CM

## 2021-11-18 DIAGNOSIS — F33.41 RECURRENT MAJOR DEPRESSIVE DISORDER, IN PARTIAL REMISSION: ICD-10-CM

## 2021-11-18 DIAGNOSIS — G47.33 OSA (OBSTRUCTIVE SLEEP APNEA): Primary | ICD-10-CM

## 2021-11-18 PROCEDURE — 1160F PR REVIEW ALL MEDS BY PRESCRIBER/CLIN PHARMACIST DOCUMENTED: ICD-10-PCS | Mod: HCNC,CPTII,95, | Performed by: INTERNAL MEDICINE

## 2021-11-18 PROCEDURE — 1159F MED LIST DOCD IN RCRD: CPT | Mod: HCNC,CPTII,95, | Performed by: INTERNAL MEDICINE

## 2021-11-18 PROCEDURE — 99212 PR OFFICE/OUTPT VISIT, EST, LEVL II, 10-19 MIN: ICD-10-PCS | Mod: HCNC,95,, | Performed by: INTERNAL MEDICINE

## 2021-11-18 PROCEDURE — 1159F PR MEDICATION LIST DOCUMENTED IN MEDICAL RECORD: ICD-10-PCS | Mod: HCNC,CPTII,95, | Performed by: INTERNAL MEDICINE

## 2021-11-18 PROCEDURE — 1160F RVW MEDS BY RX/DR IN RCRD: CPT | Mod: HCNC,CPTII,95, | Performed by: INTERNAL MEDICINE

## 2021-11-18 PROCEDURE — 99212 OFFICE O/P EST SF 10 MIN: CPT | Mod: HCNC,95,, | Performed by: INTERNAL MEDICINE

## 2021-11-18 RX ORDER — PRAMIPEXOLE DIHYDROCHLORIDE 0.25 MG/1
TABLET ORAL
Qty: 60 TABLET | Refills: 3 | Status: SHIPPED | OUTPATIENT
Start: 2021-11-18 | End: 2022-02-16

## 2021-12-02 ENCOUNTER — PATIENT MESSAGE (OUTPATIENT)
Dept: INTERNAL MEDICINE | Facility: CLINIC | Age: 71
End: 2021-12-02
Payer: MEDICARE

## 2021-12-03 ENCOUNTER — PATIENT MESSAGE (OUTPATIENT)
Dept: INTERNAL MEDICINE | Facility: CLINIC | Age: 71
End: 2021-12-03
Payer: MEDICARE

## 2021-12-03 RX ORDER — LEVOCETIRIZINE DIHYDROCHLORIDE 5 MG/1
5 TABLET, FILM COATED ORAL NIGHTLY
Qty: 30 TABLET | Refills: 11 | Status: SHIPPED | OUTPATIENT
Start: 2021-12-03 | End: 2021-12-23 | Stop reason: SDUPTHER

## 2021-12-13 ENCOUNTER — PATIENT MESSAGE (OUTPATIENT)
Dept: INTERNAL MEDICINE | Facility: CLINIC | Age: 71
End: 2021-12-13
Payer: MEDICARE

## 2021-12-23 RX ORDER — LEVOCETIRIZINE DIHYDROCHLORIDE 5 MG/1
5 TABLET, FILM COATED ORAL NIGHTLY
Qty: 30 TABLET | Refills: 11 | Status: SHIPPED | OUTPATIENT
Start: 2021-12-23 | End: 2022-02-16

## 2022-02-16 DIAGNOSIS — I10 ESSENTIAL HYPERTENSION: ICD-10-CM

## 2022-02-16 DIAGNOSIS — F33.41 RECURRENT MAJOR DEPRESSIVE DISORDER, IN PARTIAL REMISSION: ICD-10-CM

## 2022-02-16 RX ORDER — LOSARTAN POTASSIUM AND HYDROCHLOROTHIAZIDE 12.5; 5 MG/1; MG/1
1 TABLET ORAL DAILY
Qty: 90 TABLET | Refills: 0 | Status: SHIPPED | OUTPATIENT
Start: 2022-02-16

## 2022-02-16 RX ORDER — LEVOCETIRIZINE DIHYDROCHLORIDE 5 MG/1
5 TABLET, FILM COATED ORAL NIGHTLY
Qty: 90 TABLET | Refills: 0 | Status: SHIPPED | OUTPATIENT
Start: 2022-02-16

## 2022-02-16 RX ORDER — LEVOTHYROXINE SODIUM 100 UG/1
TABLET ORAL
Qty: 90 TABLET | Refills: 0 | OUTPATIENT
Start: 2022-02-16

## 2022-02-16 RX ORDER — CITALOPRAM 40 MG/1
TABLET, FILM COATED ORAL
Qty: 90 TABLET | Refills: 0 | OUTPATIENT
Start: 2022-02-16

## 2022-02-16 NOTE — TELEPHONE ENCOUNTER
No new care gaps identified.  Powered by [employer]. Reference number: [id number].   2/16/2022 10:04:14 AM CST

## 2022-02-16 NOTE — TELEPHONE ENCOUNTER
No new care gaps identified.  Powered by [employer]. Reference number: [id number].   2/16/2022 10:02:18 AM CST

## 2022-02-16 NOTE — TELEPHONE ENCOUNTER
No new care gaps identified.  Powered by [employer]. Reference number: [id number].   2/16/2022 10:03:05 AM CST

## 2022-02-16 NOTE — TELEPHONE ENCOUNTER
No new care gaps identified.  Powered by [employer]. Reference number: [id number].   2/16/2022 10:02:00 AM CST

## 2022-02-16 NOTE — TELEPHONE ENCOUNTER
This Rx Request does not qualify for assessment with the OR.    Please review Protocol Details (hover to discover) and the Care Due Message for extra clinical information.      Pharmacy is requesting new scripts for the following medications without required information, (sig/ frequency/qty/etc)   Pharmacies have been requesting medications for patients without required information, (sig, frequency, qty, etc.). In addition, requests are sent for medication(s) pt. are currently not taking, and medications patients have never taken.    We have spoken to the pharmacies about these request types and advised their teams previously that we are unable to assess these New Script requests and require all details for these requests. This is a known issue and has been reported.

## 2022-02-16 NOTE — TELEPHONE ENCOUNTER
Quick DC. Inappropriate Request    Refill Authorization Note   Trinity Valenzuela  is requesting a refill authorization.  Brief Assessment and Rationale for Refill:  Quick Discontinue  Medication Therapy Plan:    Pharmacy is requesting new scripts for the following medications without required information, (sig/ frequency/qty/etc)       Medication Reconciliation Completed:  No      Comments:   Pended Medication(s)       Requested Prescriptions     Refused Prescriptions Disp Refills    levothyroxine (SYNTHROID) 100 MCG tablet 90 tablet 0     Refused By: GIACOMO LIU     Reason for Refusal: Refill not appropriate        Duplicate Pended Encounter(s)/ Last Prescribed Details: (includes pharmacy & prescriber details)   Pharmacies have been requesting medications for patients without required information, (sig, frequency, qty, etc.). In addition, requests are sent for medication(s) pt. are currently not taking, and medications patients have never taken.    We have spoken to the pharmacies about these request types and advised their teams previously that we are unable to assess these New Script requests and require all details for these requests. This is a known issue and has been reported.               Note composed:11:15 AM 02/16/2022

## 2022-12-09 RX ORDER — PRAMIPEXOLE DIHYDROCHLORIDE 0.25 MG/1
TABLET ORAL
Qty: 180 TABLET | Refills: 0 | Status: SHIPPED | OUTPATIENT
Start: 2022-12-09 | End: 2022-12-09 | Stop reason: SDUPTHER

## 2022-12-09 RX ORDER — PRAMIPEXOLE DIHYDROCHLORIDE 0.25 MG/1
TABLET ORAL
Qty: 180 TABLET | Refills: 0 | Status: SHIPPED | OUTPATIENT
Start: 2022-12-09

## 2023-08-03 ENCOUNTER — OFFICE VISIT (OUTPATIENT)
Dept: URGENT CARE | Facility: CLINIC | Age: 73
End: 2023-08-03
Payer: MEDICARE

## 2023-08-03 VITALS
HEIGHT: 62 IN | BODY MASS INDEX: 31.1 KG/M2 | TEMPERATURE: 98 F | SYSTOLIC BLOOD PRESSURE: 131 MMHG | DIASTOLIC BLOOD PRESSURE: 75 MMHG | RESPIRATION RATE: 20 BRPM | WEIGHT: 169 LBS | OXYGEN SATURATION: 95 % | HEART RATE: 85 BPM

## 2023-08-03 DIAGNOSIS — J02.9 SORE THROAT: Primary | ICD-10-CM

## 2023-08-03 DIAGNOSIS — G44.209 ACUTE NON INTRACTABLE TENSION-TYPE HEADACHE: ICD-10-CM

## 2023-08-03 LAB
CTP QC/QA: YES
SARS-COV-2 AG RESP QL IA.RAPID: NEGATIVE

## 2023-08-03 PROCEDURE — 99214 OFFICE O/P EST MOD 30 MIN: CPT | Mod: S$GLB,,, | Performed by: INTERNAL MEDICINE

## 2023-08-03 PROCEDURE — 87811 SARS-COV-2 COVID19 W/OPTIC: CPT | Mod: QW,S$GLB,, | Performed by: INTERNAL MEDICINE

## 2023-08-03 PROCEDURE — 87811 SARS CORONAVIRUS 2 ANTIGEN POCT, MANUAL READ: ICD-10-PCS | Mod: QW,S$GLB,, | Performed by: INTERNAL MEDICINE

## 2023-08-03 PROCEDURE — 99214 PR OFFICE/OUTPT VISIT, EST, LEVL IV, 30-39 MIN: ICD-10-PCS | Mod: S$GLB,,, | Performed by: INTERNAL MEDICINE

## 2023-08-03 RX ORDER — ERGOCALCIFEROL 1.25 MG/1
50000 CAPSULE ORAL
COMMUNITY
Start: 2023-07-28

## 2023-08-03 RX ORDER — IBUPROFEN 800 MG/1
800 TABLET ORAL
COMMUNITY
Start: 2023-03-10 | End: 2023-08-28

## 2023-08-03 RX ORDER — ACETAMINOPHEN 500 MG
500 TABLET ORAL
Status: COMPLETED | OUTPATIENT
Start: 2023-08-03 | End: 2023-08-03

## 2023-08-03 RX ADMIN — Medication 500 MG: at 05:08

## 2023-08-03 NOTE — PROGRESS NOTES
"Subjective:      Patient ID: Trinity Valenzuela is a 73 y.o. female.    Vitals:  height is 5' 2" (1.575 m) and weight is 76.7 kg (169 lb). Her oral temperature is 98.4 °F (36.9 °C). Her blood pressure is 131/75 and her pulse is 85. Her respiration is 20 and oxygen saturation is 95%.     Chief Complaint: Headache    Patient stated her headache and ear pain started today.  She stated her throat started a few days ago.  She spoke with her PCP and she was told to contact her GI dr but she did not get a chance too. She stated she sees that her left side of her face is droopy.    72 yo female presenting for evaluation of sore throat ear pain and headache. Headache is unilateral on left side behind ear and down head. Waxing and waning. 5/10 pain. Not "worse headache of life", no numbness or tingling, no vomiting, no trauma. She felt the left side of her face was lower than the right. Denies speech difficulty. Also dealing with sore throat, under care of her PCP for this, she has plans to follow up with her GI for an endoscopy to rule out esophageal causes of her hoarse voice and sore throat.     Headache   This is a new problem. The current episode started today. The problem occurs constantly. The problem has been unchanged. The pain is located in the Temporal region. The pain does not radiate. The pain quality is not similar to prior headaches. The quality of the pain is described as aching. The pain is at a severity of 5/10. The pain is moderate. Associated symptoms include ear pain and a sore throat. Pertinent negatives include no back pain, blurred vision, coughing, dizziness, eye pain, eye redness, facial sweating, fever, muscle aches, nausea, numbness, tingling, visual change or vomiting. Treatments tried: ibuprofen. The treatment provided mild relief. Her past medical history is significant for hypertension. There is no history of migraine headaches.       Constitution: Negative for fever.   HENT:  Positive for ear " pain and sore throat.    Eyes:  Negative for eye pain, eye redness and blurred vision.   Respiratory:  Negative for cough.    Gastrointestinal:  Negative for nausea and vomiting.   Musculoskeletal:  Negative for back pain.   Skin:  Negative for erythema.   Neurological:  Positive for headaches. Negative for dizziness, history of migraines and numbness.      Objective:     Physical Exam   Constitutional: She is oriented to person, place, and time. She appears well-developed.  Non-toxic appearance. She does not appear ill. No distress.   HENT:   Head: Normocephalic and atraumatic.   Ears:   Right Ear: External ear normal.   Left Ear: External ear normal.   Nose: Nose normal.   Mouth/Throat: Oropharynx is clear and moist. Mucous membranes are moist. No oropharyngeal exudate. Oropharynx is clear.   Eyes: Conjunctivae and EOM are normal. Pupils are equal, round, and reactive to light. No visual field deficit is present. Right eye exhibits no discharge. Left eye exhibits no discharge. No scleral icterus.   Neck: Neck supple.   Cardiovascular: Normal rate, regular rhythm and normal heart sounds.   No murmur heard.Exam reveals no gallop and no friction rub.   Pulmonary/Chest: Effort normal. No respiratory distress. She has no wheezes. She has no rales.   Abdominal: Bowel sounds are normal. She exhibits no distension. Soft.   Lymphadenopathy:     She has no cervical adenopathy.   Neurological: She is alert and oriented to person, place, and time. She has normal motor skills, normal sensation and intact cranial nerves (2-12). She displays no weakness, no tremor, facial symmetry and no dysarthria. No cranial nerve deficit or sensory deficit. She exhibits normal muscle tone. She has a normal Finger-Nose-Finger Test. Coordination: Heel to shin test normal and Rapid alternating movements normal. She shows no pronator drift. She displays no seizure activity. Gait and coordination normal. Coordination and gait normal. GCS eye  subscore is 4. GCS verbal subscore is 5. GCS motor subscore is 6.      Comments: Face is symmetrical, no facial droop observed.    Skin: Skin is warm, dry, not diaphoretic and no rash. Capillary refill takes less than 2 seconds. No erythema   Psychiatric: Her behavior is normal.   Nursing note and vitals reviewed.      Assessment:     1. Sore throat    2. Acute non intractable tension-type headache        Plan:       Sore throat  -     SARS Coronavirus 2 Antigen, POCT Manual Read    Acute non intractable tension-type headache  -     acetaminophen tablet 500 mg      Likely tension headache, took 800 mg of motrin <1 hr PTA. Advised that she is not having red flag symptoms of headache, no neurologic deficits. Reassured that I do not observe facial drooping. Highly doubt CVA. Instructed to rest, hydrate, take fluids. If any worsening to go to ER, but based on my history, absence of red flag signs, no neurologic deficit, think this is unlikely.

## 2023-08-06 ENCOUNTER — TELEPHONE (OUTPATIENT)
Dept: URGENT CARE | Facility: CLINIC | Age: 73
End: 2023-08-06
Payer: MEDICARE

## 2023-08-28 ENCOUNTER — OFFICE VISIT (OUTPATIENT)
Dept: PODIATRY | Facility: CLINIC | Age: 73
End: 2023-08-28
Payer: MEDICARE

## 2023-08-28 VITALS — SYSTOLIC BLOOD PRESSURE: 125 MMHG | HEART RATE: 76 BPM | DIASTOLIC BLOOD PRESSURE: 82 MMHG

## 2023-08-28 DIAGNOSIS — M79.671 PAIN IN BOTH FEET: Primary | ICD-10-CM

## 2023-08-28 DIAGNOSIS — M77.50 BONE SPUR OF FOOT: ICD-10-CM

## 2023-08-28 DIAGNOSIS — M79.672 PAIN IN BOTH FEET: Primary | ICD-10-CM

## 2023-08-28 DIAGNOSIS — G57.30 DEEP PERONEAL NEUROPATHY, UNSPECIFIED LATERALITY: ICD-10-CM

## 2023-08-28 PROCEDURE — 3074F PR MOST RECENT SYSTOLIC BLOOD PRESSURE < 130 MM HG: ICD-10-PCS | Mod: CPTII,S$GLB,, | Performed by: PODIATRIST

## 2023-08-28 PROCEDURE — 99999 PR PBB SHADOW E&M-EST. PATIENT-LVL III: ICD-10-PCS | Mod: PBBFAC,,, | Performed by: PODIATRIST

## 2023-08-28 PROCEDURE — 1125F PR PAIN SEVERITY QUANTIFIED, PAIN PRESENT: ICD-10-PCS | Mod: CPTII,S$GLB,, | Performed by: PODIATRIST

## 2023-08-28 PROCEDURE — 3079F PR MOST RECENT DIASTOLIC BLOOD PRESSURE 80-89 MM HG: ICD-10-PCS | Mod: CPTII,S$GLB,, | Performed by: PODIATRIST

## 2023-08-28 PROCEDURE — 1160F PR REVIEW ALL MEDS BY PRESCRIBER/CLIN PHARMACIST DOCUMENTED: ICD-10-PCS | Mod: CPTII,S$GLB,, | Performed by: PODIATRIST

## 2023-08-28 PROCEDURE — 99203 OFFICE O/P NEW LOW 30 MIN: CPT | Mod: S$GLB,,, | Performed by: PODIATRIST

## 2023-08-28 PROCEDURE — 3061F PR NEG MICROALBUMINURIA RESULT DOCUMENTED/REVIEW: ICD-10-PCS | Mod: CPTII,S$GLB,, | Performed by: PODIATRIST

## 2023-08-28 PROCEDURE — 3074F SYST BP LT 130 MM HG: CPT | Mod: CPTII,S$GLB,, | Performed by: PODIATRIST

## 2023-08-28 PROCEDURE — 1159F PR MEDICATION LIST DOCUMENTED IN MEDICAL RECORD: ICD-10-PCS | Mod: CPTII,S$GLB,, | Performed by: PODIATRIST

## 2023-08-28 PROCEDURE — 3044F HG A1C LEVEL LT 7.0%: CPT | Mod: CPTII,S$GLB,, | Performed by: PODIATRIST

## 2023-08-28 PROCEDURE — 3066F NEPHROPATHY DOC TX: CPT | Mod: CPTII,S$GLB,, | Performed by: PODIATRIST

## 2023-08-28 PROCEDURE — 99999 PR PBB SHADOW E&M-EST. PATIENT-LVL III: CPT | Mod: PBBFAC,,, | Performed by: PODIATRIST

## 2023-08-28 PROCEDURE — 1125F AMNT PAIN NOTED PAIN PRSNT: CPT | Mod: CPTII,S$GLB,, | Performed by: PODIATRIST

## 2023-08-28 PROCEDURE — 1160F RVW MEDS BY RX/DR IN RCRD: CPT | Mod: CPTII,S$GLB,, | Performed by: PODIATRIST

## 2023-08-28 PROCEDURE — 3066F PR DOCUMENTATION OF TREATMENT FOR NEPHROPATHY: ICD-10-PCS | Mod: CPTII,S$GLB,, | Performed by: PODIATRIST

## 2023-08-28 PROCEDURE — 3079F DIAST BP 80-89 MM HG: CPT | Mod: CPTII,S$GLB,, | Performed by: PODIATRIST

## 2023-08-28 PROCEDURE — 1159F MED LIST DOCD IN RCRD: CPT | Mod: CPTII,S$GLB,, | Performed by: PODIATRIST

## 2023-08-28 PROCEDURE — 99203 PR OFFICE/OUTPT VISIT, NEW, LEVL III, 30-44 MIN: ICD-10-PCS | Mod: S$GLB,,, | Performed by: PODIATRIST

## 2023-08-28 PROCEDURE — 3044F PR MOST RECENT HEMOGLOBIN A1C LEVEL <7.0%: ICD-10-PCS | Mod: CPTII,S$GLB,, | Performed by: PODIATRIST

## 2023-08-28 PROCEDURE — 3061F NEG MICROALBUMINURIA REV: CPT | Mod: CPTII,S$GLB,, | Performed by: PODIATRIST

## 2023-08-28 RX ORDER — MELOXICAM 15 MG/1
15 TABLET ORAL DAILY
Qty: 30 TABLET | Refills: 0 | Status: SHIPPED | OUTPATIENT
Start: 2023-08-28

## 2023-08-28 RX ORDER — DICLOFENAC SODIUM 10 MG/G
2 GEL TOPICAL 4 TIMES DAILY
Qty: 100 G | Refills: 2 | Status: SHIPPED | OUTPATIENT
Start: 2023-08-28

## 2023-08-31 NOTE — PROGRESS NOTES
Subjective:      Patient ID: Trinity Valenzuela is a 73 y.o. female.    Chief Complaint:   Foot Pain (Bilateral along lateral top /Radiating sharp shocking pain )    Trinity is a 73 y.o. female who presents to the podiatry clinic  with complaint of  bilateral foot pain. Onset of the symptoms was several months ago. Precipitating event: none known. Current symptoms include: ability to bear weight, but with some pain. Aggravating factors: any weight bearing. Symptoms have gradually improved. Patient has had no prior foot problems. Evaluation to date: none. Treatment to date: avoidance of offending activity. Patients rates pain 5/10 on pain scale.    Review of Systems   Constitutional: Negative for chills, decreased appetite, fever and malaise/fatigue.   HENT:  Negative for congestion, hearing loss, nosebleeds and tinnitus.    Eyes:  Negative for double vision, pain, photophobia and visual disturbance.   Cardiovascular:  Negative for chest pain, claudication, cyanosis and leg swelling.   Respiratory:  Negative for cough, hemoptysis, shortness of breath and wheezing.    Endocrine: Negative for cold intolerance and heat intolerance.   Hematologic/Lymphatic: Negative for adenopathy and bleeding problem.   Skin:  Negative for color change, dry skin, itching, nail changes and suspicious lesions.   Musculoskeletal:  Positive for arthritis, joint pain and myalgias. Negative for stiffness.   Gastrointestinal:  Negative for abdominal pain, jaundice, nausea and vomiting.   Genitourinary:  Negative for dysuria, frequency and hematuria.   Neurological:  Positive for paresthesias and sensory change. Negative for difficulty with concentration, loss of balance and numbness.   Psychiatric/Behavioral:  Negative for altered mental status, hallucinations and suicidal ideas. The patient is not nervous/anxious.    Allergic/Immunologic: Negative for environmental allergies and persistent infections.         Objective:      Physical  Exam  Vitals reviewed.   Constitutional:       Appearance: She is well-developed.   HENT:      Head: Normocephalic and atraumatic.   Cardiovascular:      Pulses:           Dorsalis pedis pulses are 2+ on the right side and 2+ on the left side.        Posterior tibial pulses are 2+ on the right side and 2+ on the left side.   Pulmonary:      Effort: Pulmonary effort is normal.   Musculoskeletal:         General: Normal range of motion.      Comments: Inspection and palpation of the muscles joints and bones of both lower extremities reveal that muscle strength for the anterior lateral and posterior muscle groups and intrinsic muscle groups of the foot are all 5 over 5 symmetrical.      Palpable exostosis bilateral dorsum foot at the base of 1st and 2nd metatarsal cuneiform joints.   Skin:     General: Skin is warm and dry.      Capillary Refill: Capillary refill takes 2 to 3 seconds.      Comments: Skin turgor is normal bilaterally.  Skin texture is well hydrated to both lower extremities.  No lesions or rashes or wounds appreciated bilaterally.  Nail plates 1 through 5 bilaterally are within normal limits for length and thickness.  No nail clubbing or incurvation noted.   Neurological:      Mental Status: She is alert and oriented to person, place, and time.      Comments: Sharp dull light touch vibratory proprioceptive sensation are intact bilaterally.  Deep tendon reflexes to patellar and Achilles tendon are symmetrical 2 over 4 bilaterally.  No ankle clonus or Babinski reflexes noted bilaterally.  Coordination is normal to both feet and lower extremities.    Positive Tinel sign/provocation sign noted bilateral deep peroneal nerves.   Psychiatric:         Behavior: Behavior normal.           Assessment:       Encounter Diagnoses   Name Primary?    Pain in both feet Yes    Deep peroneal neuropathy, unspecified laterality     Bone spur of foot      Independent visualization of imaging was performed.  Results were  reviewed in detail with patient.       Plan:       Trinity was seen today for foot pain.    Diagnoses and all orders for this visit:    Pain in both feet  -     X-Ray Foot Complete Bilateral; Future    Deep peroneal neuropathy, unspecified laterality    Bone spur of foot    Other orders  -     meloxicam (MOBIC) 15 MG tablet; Take 1 tablet (15 mg total) by mouth once daily.  -     diclofenac sodium (VOLTAREN) 1 % Gel; Apply 2 g topically 4 (four) times daily.      I counseled the patient on her conditions, their implications and medical management.    The nature of the condition, options for management, as well as potential risks and complications were discussed in detail with patient. Patient was amenable to my recommendations and left my office fully informed and will follow up as instructed or sooner if necessary.      Discussed options for peripheral neuropathy/nerve entrapment syndrome including nerve block therapy, surgical nerve entrapment decompression procedures, and various vitamins and supplementation available shown to improve nerve function.    Conservative and surgical options discussed in detail.  Begin topical and oral anti-inflammatory medications.  Follow-up to review radiographs.

## 2024-06-12 ENCOUNTER — OFFICE VISIT (OUTPATIENT)
Dept: ORTHOPEDICS | Facility: CLINIC | Age: 74
End: 2024-06-12
Payer: MEDICARE

## 2024-06-12 VITALS — HEIGHT: 62 IN | BODY MASS INDEX: 30.91 KG/M2

## 2024-06-12 DIAGNOSIS — S52.614A CLOSED NONDISPLACED FRACTURE OF STYLOID PROCESS OF RIGHT ULNA, INITIAL ENCOUNTER: ICD-10-CM

## 2024-06-12 DIAGNOSIS — S62.101A CLOSED FRACTURE OF RIGHT WRIST, INITIAL ENCOUNTER: Primary | ICD-10-CM

## 2024-06-12 DIAGNOSIS — S52.501A CLOSED FRACTURE OF DISTAL END OF RIGHT RADIUS, UNSPECIFIED FRACTURE MORPHOLOGY, INITIAL ENCOUNTER: ICD-10-CM

## 2024-06-12 PROCEDURE — 1101F PT FALLS ASSESS-DOCD LE1/YR: CPT | Mod: CPTII,S$GLB,, | Performed by: ORTHOPAEDIC SURGERY

## 2024-06-12 PROCEDURE — 99999 PR PBB SHADOW E&M-EST. PATIENT-LVL III: CPT | Mod: PBBFAC,,, | Performed by: ORTHOPAEDIC SURGERY

## 2024-06-12 PROCEDURE — 1159F MED LIST DOCD IN RCRD: CPT | Mod: CPTII,S$GLB,, | Performed by: ORTHOPAEDIC SURGERY

## 2024-06-12 PROCEDURE — 1126F AMNT PAIN NOTED NONE PRSNT: CPT | Mod: CPTII,S$GLB,, | Performed by: ORTHOPAEDIC SURGERY

## 2024-06-12 PROCEDURE — 3288F FALL RISK ASSESSMENT DOCD: CPT | Mod: CPTII,S$GLB,, | Performed by: ORTHOPAEDIC SURGERY

## 2024-06-12 PROCEDURE — 99204 OFFICE O/P NEW MOD 45 MIN: CPT | Mod: S$GLB,,, | Performed by: ORTHOPAEDIC SURGERY

## 2024-06-12 RX ORDER — CEFAZOLIN SODIUM 2 G/50ML
2 SOLUTION INTRAVENOUS
Status: CANCELLED | OUTPATIENT
Start: 2024-06-12

## 2024-06-12 NOTE — PROGRESS NOTES
CC:  Right distal radius fracture displaced intra-articular        HPI:  Trinity Valenzuela is a very pleasant 74 y.o. female sustained injury to her right wrist several days ago when she fell at home   She was seen in the emergency room noted to have a right distal radius fracture   Placed in a splint   Referred for treatment   Complaining of mild pain no numbness right hand         PAST MEDICAL HISTORY:   Past Medical History:   Diagnosis Date    Depression     GERD (gastroesophageal reflux disease)     Hypertension     Thyroid disease      PAST SURGICAL HISTORY:   Past Surgical History:   Procedure Laterality Date    ESOPHAGEAL MANOMETRY WITH MEASUREMENT OF IMPEDANCE N/A 10/2/2019    Procedure: MANOMETRY, ESOPHAGUS, WITH IMPEDANCE MEASUREMENT;  Surgeon: Etelvina Urbina MD;  Location: [facility] ([address] [place] [address]);  Service: Endoscopy;  Laterality: N/A;  Referral from [facility]   - pt confirmed appt - sm    HERNIA REPAIR      HYSTERECTOMY      NASAL TURBINATE REDUCTION      THYROIDECTOMY      TONSILLECTOMY       FAMILY HISTORY:   Family History   Problem Relation Name Age of Onset    Hypertension Mother      Hyperlipidemia Mother      Breast cancer Mother      Cancer Mother          esophageal    Diabetes Father      Hypertension Father      Hyperlipidemia Father      Migraines Brother      BRCA 1/2 Daughter      Breast cancer Daughter       SOCIAL HISTORY:   Social History     Socioeconomic History    Marital status:    Tobacco Use    Smoking status: Never    Smokeless tobacco: Never   Substance and Sexual Activity    Alcohol use: No    Drug use: No    Sexual activity: Not Currently   Social History Narrative    2014.    The patient became  25 years ago.  Her   at the age of 42 from lung cancer.  He had been an .  They had 4 children and she had the pleasure of being a full-time mother during thier early childhood.     Following her 's death she  returned to school and got a college degree in nursing.  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  It was very difficult to figure out what was going on with her cognitive function.  When she reached the age of 64, her employer, [employer] forced her to retire.          She is a good friend of [employer] and an avid .    She feels ready for correction but her 4 children think that she is depressed and that she needs to work.     She does not drink or smoke.  She has a son and daughter.     Social Determinants of Health     Financial Resource Strain: Low Risk  (8/13/2022)    Received from [facility]    Overall Financial Resource Strain (CARDIA)     Difficulty of Paying Living Expenses: Not hard at all   Food Insecurity: No Food Insecurity (8/13/2022)    Received from [facility]    Hunger Vital Sign     Worried About Running Out of Food in the Last Year: Never true     Ran Out of Food in the Last Year: Never true   Transportation Needs: No Transportation Needs (8/13/2022)    Received from [facility]    PRAPARE - Transportation     Lack of Transportation (Medical): No     Lack of Transportation (Non-Medical): No   Physical Activity: Inactive (8/13/2022)    Received from [facility]    Exercise Vital Sign     Days of Exercise per Week: 0 days     Minutes of Exercise per Session: 100 min   Stress: No Stress Concern Present (8/13/2022)    Received from [facility]    [place] [place] [employer] - Occupational Stress Questionnaire     Feeling of Stress : Not at all   Housing Stability: Low  Risk  (8/13/2022)    Received from [facility]    Housing Stability Vital Sign     Unable to Pay for Housing in the Last Year: No     Number of Places Lived in the Last Year: 1     In the last 12 months, was there a time when you did not have a steady place to sleep or slept in a shelter (including now)?: No       MEDICATIONS:   Current Outpatient Medications:     citalopram (CELEXA) 40 MG tablet, Take 1 tablet (40 mg total) by mouth once daily., Disp: 90 tablet, Rfl: 3    ergocalciferol (ERGOCALCIFEROL) 50,000 unit Cap, Take 50,000 Units by mouth every 7 days., Disp: , Rfl:     HYDROcodone-acetaminophen (NORCO) 5-325 mg per tablet, Take 1 tablet by mouth every 6 (six) hours as needed for Pain., Disp: 12 tablet, Rfl: 0    levocetirizine (XYZAL) 5 MG tablet, Take 1 tablet (5 mg total) by mouth every evening., Disp: 90 tablet, Rfl: 0    losartan-hydrochlorothiazide 50-12.5 mg (HYZAAR) 50-12.5 mg per tablet, Take 1 tablet by mouth once daily., Disp: 90 tablet, Rfl: 0    meloxicam (MOBIC) 15 MG tablet, Take 1 tablet (15 mg total) by mouth once daily., Disp: 30 tablet, Rfl: 0    pramipexole (MIRAPEX) 0.25 MG tablet, TAKE 1 OR 2 TABLETS ONE HOUR PRIOR TO BEDTIME, Disp: 180 tablet, Rfl: 0    diclofenac sodium (VOLTAREN) 1 % Gel, Apply 2 g topically 4 (four) times daily. (Patient not taking: Reported on 6/12/2024), Disp: 100 g, Rfl: 2  ALLERGIES: Review of patient's allergies indicates:  No Known Allergies    Review of Systems:  Constitutional: no fever or chills  ENT: no nasal congestion or sore throat  Respiratory: no cough or shortness of breath  Cardiovascular: no chest pain or palpitations  Gastrointestinal: no nausea or vomiting, PUD, GERD, NSAID intolerance  Genitourinary: no hematuria or dysuria  Integument/Breast: no rash or pruritis  Hematologic/Lymphatic: no easy bruising or lymphadenopathy  Musculoskeletal: see HPI  Neurological: no seizures or tremors  Behavioral/Psych: no auditory or visual  "hallucinations      Physical Exam   Vitals:    06/12/24 1548   Height: 5' 2" (1.575 m)   PainSc: 0-No pain   PainLoc: Wrist       Constitutional: Oriented to person, place, and time. Appears well-developed and well-nourished.   HENT:   Head: Normocephalic and atraumatic.   Nose: Nose normal.   Eyes: No scleral icterus.   Neck: Normal range of motion.   Cardiovascular: Normal rate and regular rhythm.    Pulses:       Radial pulses are 2+ on the right side, and 2+ on the left side.   Pulmonary/Chest: Effort normal and breath sounds normal.   Abdominal: Soft.   Neurological: Alert and oriented to person, place, and time.   Skin: Skin is warm.   Psychiatric: Normal mood and affect.     MUSCULOSKELETAL UPPER EXTREMITY:  Examination right wrist after removal of the splint there is some bruising swelling and obvious deformity is noted of the wrist   There is diffuse tenderness   Range of motion limited   Range of motion fingers slightly decreased sensation intact all digits  Skin is intact            Diagnostic Studies:  AP lateral x-ray of the right wrist show a displaced intra-articular fracture of the right wrist        Assessment:  Displaced fracture right distal radius    Plan:  I explained the nature of the problem to the patient   I have recommended surgical treatment to include ORIF right distal radius fracture next week   I have given her a wrist brace for use until then she can remove the brace for showering only   Pain medicine as needed no heavy lifting follow up for surgery      The risks and benefits of surgery were discussed with the patient today and they understand.  The consent was signed in the office for surgery.      Logan Ngo MD (Jay)  [facility]  Orthopedic Upper Extremity Surgery       "

## 2024-06-17 DIAGNOSIS — S52.614A CLOSED NONDISPLACED FRACTURE OF STYLOID PROCESS OF RIGHT ULNA, INITIAL ENCOUNTER: ICD-10-CM

## 2024-06-17 DIAGNOSIS — S52.501A CLOSED FRACTURE OF DISTAL END OF RIGHT RADIUS, UNSPECIFIED FRACTURE MORPHOLOGY, INITIAL ENCOUNTER: ICD-10-CM

## 2024-06-17 RX ORDER — MELOXICAM 15 MG/1
15 TABLET ORAL DAILY
Qty: 30 TABLET | Refills: 0 | Status: SHIPPED | OUTPATIENT
Start: 2024-06-17

## 2024-06-17 RX ORDER — HYDROCODONE BITARTRATE AND ACETAMINOPHEN 5; 325 MG/1; MG/1
1 TABLET ORAL EVERY 6 HOURS PRN
Qty: 12 TABLET | Refills: 0 | Status: SHIPPED | OUTPATIENT
Start: 2024-06-17

## 2024-06-20 ENCOUNTER — TELEPHONE (OUTPATIENT)
Dept: ORTHOPEDICS | Facility: CLINIC | Age: 74
End: 2024-06-20
Payer: MEDICARE

## 2024-06-20 ENCOUNTER — PATIENT MESSAGE (OUTPATIENT)
Dept: URGENT CARE | Facility: CLINIC | Age: 74
End: 2024-06-20

## 2024-06-20 NOTE — TELEPHONE ENCOUNTER
----- Message from Chanda Contreras sent at 6/20/2024  4:00 PM CDT -----  Regarding: Questions and concerns  Contact: [phone]  Type:  Needs Medical Advice    Who Called: Roland  Symptoms (please be specific): Severe pain, patient had surgery on yesterday  Would the patient rather a call back or a response via [facility]? Call  Best Call Back Number: [phone]  Additional Information: Patient had surgery on yesterday and is in severe pain. Would like to know what should she do. Percocet isnt working.

## 2024-06-20 NOTE — TELEPHONE ENCOUNTER
----- Message from Sherry Leo sent at 6/20/2024  2:52 PM CDT -----  Type:  Needs Medical Advice    Who Called: Pt  Symptoms (please be specific): pain in right forearm, broken wrist    How long has patient had these symptoms:  yesterday   Pharmacy name and phone #:  [id number] - [place], [place] - [address]  Would the patient rather a call back or a response via [facility]? Call   Best Call Back Number:  [phone]  Additional Information:  Pt states neither oxyCODONE-acetaminophen (PERCOCET) 5-325 mg per tablet or HYDROcodone-acetaminophen (NORCO) 5-325 mg per tablet is working. Pt is in severe pain

## 2024-06-20 NOTE — TELEPHONE ENCOUNTER
Spoke to patient. Patient underwent surgery yesterday and is stating that she is in severe pain and that the pain medication is not working. Patient asked if she can take 2 Percocets instead of 1, but I advised patient not to take more then what is instructed because she will run out faster and the pharmacy will not refill it due to it being too soon. I advised patient, Per Dr Ngo, to take 2 Advil's or 2 Motrin's along with medication, and to both medications  throughout the day. I also told patient that another option is to take her Meloxicam and alternate that medication with the Percocet. Patient verbalized understanding.

## 2024-07-10 DIAGNOSIS — S52.501A CLOSED FRACTURE OF DISTAL END OF RIGHT RADIUS, UNSPECIFIED FRACTURE MORPHOLOGY, INITIAL ENCOUNTER: Primary | ICD-10-CM

## 2024-07-11 ENCOUNTER — OFFICE VISIT (OUTPATIENT)
Dept: ORTHOPEDICS | Facility: CLINIC | Age: 74
End: 2024-07-11
Payer: MEDICARE

## 2024-07-11 DIAGNOSIS — S52.571D OTHER CLOSED INTRA-ARTICULAR FRACTURE OF DISTAL END OF RIGHT RADIUS WITH ROUTINE HEALING, SUBSEQUENT ENCOUNTER: Primary | ICD-10-CM

## 2024-07-11 PROCEDURE — 1159F MED LIST DOCD IN RCRD: CPT | Mod: CPTII,S$GLB,,

## 2024-07-11 PROCEDURE — 99024 POSTOP FOLLOW-UP VISIT: CPT | Mod: S$GLB,,,

## 2024-07-11 PROCEDURE — 97110 THERAPEUTIC EXERCISES: CPT | Mod: S$GLB,,,

## 2024-07-11 PROCEDURE — 1160F RVW MEDS BY RX/DR IN RCRD: CPT | Mod: CPTII,S$GLB,,

## 2024-07-11 PROCEDURE — 99999 PR PBB SHADOW E&M-EST. PATIENT-LVL III: CPT | Mod: PBBFAC,,,

## 2024-07-11 RX ORDER — IBUPROFEN 800 MG/1
800 TABLET ORAL 3 TIMES DAILY
Qty: 90 TABLET | Refills: 1 | Status: SHIPPED | OUTPATIENT
Start: 2024-07-11

## 2024-07-11 NOTE — PROGRESS NOTES
Subjective:      Patient ID: Trinity Valenzuela is a 74 y.o. female.    Chief Complaint: Post-op Follow Up    HPI: Trinity Valenzuela returns for a 2 week post-op visit following: ORIF right distal radius fracture (date of surgery 06/19/2024) with Dr. Ngo. Overall, the patient is doing well with no complaints of fever, chills, nausea, vomiting, SOB, chest pains, or drainage to surgical incision. The patient reports that pain has been managed with medication. She has not begun formal PT/OT yet, but maintains compliance with activity restrictions. Post-operative complaints include: none    PAST MEDICAL HISTORY:    Past Medical History:   Diagnosis Date    Depression     GERD (gastroesophageal reflux disease)     Hypertension     Memory loss     Thyroid disease      MEDICATIONS:   Current Outpatient Medications:     citalopram (CELEXA) 40 MG tablet, Take 1 tablet (40 mg total) by mouth once daily., Disp: 90 tablet, Rfl: 3    ergocalciferol (ERGOCALCIFEROL) 50,000 unit Cap, Take 50,000 Units by mouth every 7 days., Disp: , Rfl:     famotidine (PEPCID) 40 MG tablet, Take 40 mg by mouth once daily., Disp: , Rfl:     HYDROcodone-acetaminophen (NORCO) 5-325 mg per tablet, Take 1 tablet by mouth every 6 (six) hours as needed for Pain., Disp: 12 tablet, Rfl: 0    ibuprofen (ADVIL,MOTRIN) 800 MG tablet, Take 1 tablet (800 mg total) by mouth 3 (three) times daily., Disp: 90 tablet, Rfl: 1    levocetirizine (XYZAL) 5 MG tablet, Take 1 tablet (5 mg total) by mouth every evening., Disp: 90 tablet, Rfl: 0    levothyroxine (SYNTHROID) 100 MCG tablet, Take 100 mcg by mouth before breakfast., Disp: , Rfl:     losartan-hydrochlorothiazide 50-12.5 mg (HYZAAR) 50-12.5 mg per tablet, Take 1 tablet by mouth once daily., Disp: 90 tablet, Rfl: 0    meloxicam (MOBIC) 15 MG tablet, Take 1 tablet (15 mg total) by mouth once daily., Disp: 30 tablet, Rfl: 0    oxyCODONE-acetaminophen (PERCOCET) 5-325 mg per tablet, Take 1 tablet by mouth every 4  (four) hours as needed for Pain., Disp: 20 tablet, Rfl: 0    pramipexole (MIRAPEX) 0.25 MG tablet, TAKE 1 OR 2 TABLETS ONE HOUR PRIOR TO BEDTIME, Disp: 180 tablet, Rfl: 0    ALLERGIES:   Review of patient's allergies indicates:  No Known Allergies    Review of Systems:  Constitution: Negative for chills, fever and night sweats.   HENT: Negative for congestion and headaches.    Eyes: Negative for blurred vision or vision loss.  Cardiovascular: Negative for chest pain and syncope.   Respiratory: Negative for cough and shortness of breath.    Endocrine: Negative for polydipsia, polyphagia and polyuria.   Hematologic/Lymphatic: Negative for bleeding problem. Does not bruise/bleed easily.   Skin: Negative for dry skin, itching and rash.   Musculoskeletal: See HPI.   Gastrointestinal: Negative for abdominal pain and bowel incontinence.   Genitourinary: Negative for bladder incontinence and nocturia.   Neurological: Negative for disturbances in coordination, loss of balance and seizures.   Psychiatric/Behavioral: Negative for depression. The patient does not have insomnia.    Allergic/Immunologic: Negative for hives and persistent infections.        Objective:      There were no vitals filed for this visit.    PHYSICAL EXAM:  General: Alert & oriented x3, well-developed and well-nourished, in no acute distress, sitting comfortably in the exam room.  Skin: Warm and dry. Capillary refill less than 2 seconds.   Head: Normocephalic and atraumatic.   Eyes: Sclera appear normal.   Nose: No deformities seen.   Ears: No deformities seen.   Neck: No tracheal deviation present.   Pulmonary/Chest: Breathing unlabored.   Neurological: Alert and oriented to person, place, and time.   Psychiatric: Mood is pleasant and affect appropriate.       RIGHT HAND/WRIST:        Observation/Inspection:    Surgical wound margins are well approximated and healing nicely.    No redness, warmth, drainage, or other signs of infection.  Mild  hand/forearm swelling.         Range of Motion:  Wrist and fingers ROM intact and limited with some stiffness due to soreness and swelling.        Palpation:   Tenderness to palpation to the wrist.   Otherwise, negative tenderness to palpation to bony prominences and soft tissues throughout.        Strength:    strength not tested today.        Neurovascular Exam:  Digits warm and well perfused, brisk capillary refill <3 seconds throughout  Sensation intact to finger pads.   NVI motor/LTS to median, radial, and ulnar nerves; radial pulse 2+    Imaging:    X-Rays: 3 views of right wrist obtained in the Orthopedic clinic today, and independently reviewed, show: There is fixation hardware within the distal radius with no evidence of hardware failure. There is a mildly displaced fracture of the ulnar styloid process.         Assessment:       1. Other closed intra-articular fracture of distal end of right radius with routine healing, subsequent encounter        Plan:       Orders Placed This Encounter    X-Ray Wrist Complete Right    Ambulatory referral/consult to Physical/Occupational Therapy    ibuprofen (ADVIL,MOTRIN) 800 MG tablet     2 weeks s/p ORIF right distal radius fracture     Overall patient is doing well post-operatively.    Wound Care:  Regular wound care explained to the patient.  Okay to wash incision with soap and water and pat to dry.   Medications:  Refill for Ibuprofen 800 mg provided today for patient to use PRN.  Antibiotics:  None prescribed today.  No evidence of wound infection.  Occupational Therapy:  Ambulatory referral to occupational therapy for hand/wrist ROM, stretching, strengthening, and conditioning.  HEP:  27951 - Kailey Hickey PA-C instructed and demonstrated a hand/wrist ROM HEP. The patient then demonstrated understanding of exercises and proper technique. This program was performed for 8 minutes.   DME:  Advised patient to wear velcro wrist brace majority of the time.   Okay to remove the brace for showering, hygiene, and gentle ROM exercises.  Activity Modifications:  No lifting, gripping, pushing, pulling with operative hand at this time.   Pain Management: Ice compress to the affected area 2-3x a day for 15-20 minutes as needed for pain management.    Follow-Up:  4 weeks with Kailey Hickey PA-C for 2nd post-op visit.  New right wrist x-rays next visit.     All of the patient's questions were answered and the patient will contact us if they have any questions or concerns in the interim.    Kailey Hickey PA-C  [facility]  Orthopedic Surgery    Medical Dictation software was used during the dictation of portions or the entirety of this medical record.  Phonetic or grammatic errors may exist due to the use of this software. For clarification, refer to the author of the document.

## 2024-07-12 ENCOUNTER — PATIENT MESSAGE (OUTPATIENT)
Dept: ORTHOPEDICS | Facility: CLINIC | Age: 74
End: 2024-07-12
Payer: MEDICARE

## 2024-07-15 DIAGNOSIS — S52.571D OTHER CLOSED INTRA-ARTICULAR FRACTURE OF DISTAL END OF RIGHT RADIUS WITH ROUTINE HEALING, SUBSEQUENT ENCOUNTER: Primary | ICD-10-CM

## 2024-07-18 ENCOUNTER — OFFICE VISIT (OUTPATIENT)
Dept: URGENT CARE | Facility: CLINIC | Age: 74
End: 2024-07-18
Payer: MEDICARE

## 2024-07-18 VITALS
OXYGEN SATURATION: 97 % | HEIGHT: 62 IN | BODY MASS INDEX: 30.34 KG/M2 | RESPIRATION RATE: 16 BRPM | TEMPERATURE: 98 F | DIASTOLIC BLOOD PRESSURE: 82 MMHG | HEART RATE: 72 BPM | SYSTOLIC BLOOD PRESSURE: 110 MMHG | WEIGHT: 164.88 LBS

## 2024-07-18 DIAGNOSIS — U07.1 COVID-19: Primary | ICD-10-CM

## 2024-07-18 DIAGNOSIS — U07.1 COVID-19 VIRUS DETECTED: ICD-10-CM

## 2024-07-18 DIAGNOSIS — R09.81 NASAL CONGESTION: ICD-10-CM

## 2024-07-18 DIAGNOSIS — R05.9 COUGH, UNSPECIFIED TYPE: ICD-10-CM

## 2024-07-18 LAB
CTP QC/QA: YES
SARS-COV-2 AG RESP QL IA.RAPID: POSITIVE

## 2024-07-18 PROCEDURE — 87811 SARS-COV-2 COVID19 W/OPTIC: CPT | Mod: QW,S$GLB,, | Performed by: NURSE PRACTITIONER

## 2024-07-18 PROCEDURE — 99213 OFFICE O/P EST LOW 20 MIN: CPT | Mod: S$GLB,,, | Performed by: NURSE PRACTITIONER

## 2024-07-18 RX ORDER — BENZONATATE 100 MG/1
100 CAPSULE ORAL 3 TIMES DAILY PRN
Qty: 30 CAPSULE | Refills: 0 | Status: SHIPPED | OUTPATIENT
Start: 2024-07-18 | End: 2024-07-28

## 2024-07-18 RX ORDER — NIRMATRELVIR AND RITONAVIR 300-100 MG
KIT ORAL
Qty: 30 TABLET | Refills: 0 | Status: SHIPPED | OUTPATIENT
Start: 2024-07-18

## 2024-07-18 RX ORDER — AZELASTINE 1 MG/ML
1 SPRAY, METERED NASAL 2 TIMES DAILY
Qty: 30 ML | Refills: 0 | Status: SHIPPED | OUTPATIENT
Start: 2024-07-18 | End: 2025-07-18

## 2024-07-18 RX ORDER — OMEPRAZOLE 20 MG/1
20 CAPSULE, DELAYED RELEASE ORAL
COMMUNITY
Start: 2024-06-13

## 2024-07-18 NOTE — PROGRESS NOTES
"Subjective:      Patient ID: Trinity Valenzuela is a 74 y.o. female.    Vitals:  height is 5' 2" (1.575 m) and weight is 74.8 kg (164 lb 14.5 oz). Her oral temperature is 98.1 °F (36.7 °C). Her blood pressure is 110/82 and her pulse is 72. Her respiration is 16 and oxygen saturation is 97%.     Chief Complaint: Sinus Problem    75 y/o female presents with a complaint of a non-productive cough, sore throat, nasal congestion and fatigue.  Onset of symptoms, 3 days ago.  She denies chest pain, SOB, chills, or palpitations.     Sinus Problem  This is a new problem. The current episode started yesterday. The problem has been gradually worsening since onset. The maximum temperature recorded prior to her arrival was 100.4 - 100.9 F. The fever has been present for 1 to 2 days. Her pain is at a severity of 6/10 (sore throat). The pain is moderate. Associated symptoms include congestion, coughing, sinus pressure and a sore throat. Pertinent negatives include no chills, ear pain, headaches, neck pain, shortness of breath, sneezing or swollen glands. Treatments tried: ibuprofen 800, hydrocodone, nyquil, dayquil. The treatment provided mild relief.       Constitution: Positive for fatigue and fever (highest temp 100.0). Negative for chills, generalized weakness and international travel in last 60 days.   HENT:  Positive for congestion, sinus pain, sinus pressure and sore throat. Negative for ear pain, postnasal drip, trouble swallowing and voice change.    Neck: Negative for neck pain.   Cardiovascular:  Negative for chest pain and palpitations.   Respiratory:  Positive for cough. Negative for chest tightness, sputum production, bloody sputum, COPD, shortness of breath, stridor, wheezing and asthma.    Gastrointestinal:  Negative for nausea, vomiting and diarrhea.   Skin:  Negative for rash.   Allergic/Immunologic: Negative for asthma and sneezing.   Neurological:  Negative for headaches.      Objective:     Physical Exam "   Constitutional: She is oriented to person, place, and time. She appears well-developed. She is cooperative.  Non-toxic appearance. She does not appear ill. No distress.   HENT:   Head: Normocephalic and atraumatic.   Ears:   Right Ear: Hearing, tympanic membrane, external ear and ear canal normal. no impacted cerumen  Left Ear: Hearing, tympanic membrane, external ear and ear canal normal. no impacted cerumen  Nose: Rhinorrhea and congestion present. No mucosal edema or nasal deformity. No epistaxis. Right sinus exhibits no maxillary sinus tenderness and no frontal sinus tenderness. Left sinus exhibits no maxillary sinus tenderness and no frontal sinus tenderness.   Mouth/Throat: Uvula is midline, oropharynx is clear and moist and mucous membranes are normal. No trismus in the jaw. Normal dentition. No uvula swelling. No oropharyngeal exudate, posterior oropharyngeal edema or posterior oropharyngeal erythema.   Eyes: Conjunctivae and lids are normal. No scleral icterus.   Neck: Trachea normal and phonation normal. Neck supple. No edema present. No erythema present. No neck rigidity present.   Cardiovascular: Normal rate, regular rhythm, normal heart sounds and normal pulses.   Pulmonary/Chest: Effort normal and breath sounds normal. No stridor. No respiratory distress. She has no decreased breath sounds. She has no wheezes. She has no rhonchi. She has no rales. She exhibits no tenderness.   Abdominal: Normal appearance.   Musculoskeletal: Normal range of motion.         General: No deformity. Normal range of motion.   Neurological: She is alert and oriented to person, place, and time. She exhibits normal muscle tone. Coordination normal.   Skin: Skin is warm, dry, intact, not diaphoretic and not pale.   Psychiatric: Her speech is normal and behavior is normal. Judgment and thought content normal.   Nursing note and vitals reviewed.      Assessment:     1. COVID-19    2. Cough, unspecified type        Plan:    COVID-19    Cough, unspecified type  -     SARS Coronavirus 2 Antigen, POCT Manual Read    Preventing Spread of Respiratory Viruses When You're Sick    When you may have a respiratory virus...  Stay home and away from others *including people you live with who are not sick) if you have respiratory virus symptoms that are not better explained by another cause. These symptoms including fever, chills, fatigue, cough, runny nose, and headache.      * You can go back to your normal activities when, for at least 24 hours, both are true:     - Your symptoms are better overall AND     - You have not had a fever (and are not using fever-reducing medication)    * When you go back to your normal activities, take added precaution over the next 5 days, such as taking additional steps for  air, hygiene, masks, physical distancing, and/or testing when you will be around other people indoors.   - Keep in mind that you may still be able to spread the virus that made you sick, even if you are feeling better. You are likely to be less contagious at this time, depending on factors like how long you were sick or how sick you were.   - If you develop a fever or you start to feel worse after you have gone back to normal activities, stay home and away from other again until, for at least 24 hours, both are true: your symptoms are improving overall, and you have not had a fever (and are not using fever-reducing medications). Then take added precaution for the next 5 days.     If you never had symptoms but tested positive for a respiratory virus...  You may be contagious. For the next 5 days: take added precautions, such as taking additional steps for  air, hygiene, masks, physical distancing, and/or testing when you will be around other people indoors. This is especially important to protect people with factors that increase their risk of severe illness from respiratory viruses.       How it works...  When you have a  respiratory virus infection, you can spread it to others. How long someone can spread the virus depends on different factors, including how sick they are (severity) and how long their illness lasts (duration). This is not the same for everyone.     When, for at least 24 hours, your symptoms are getting better overall and you have not had a fever (and are not using fever-reducing medications), you are typically less contagious, but it still take more time for your body to fully get rid of the virus. During this time, you may still be able to spread the virus to others. Taking precautions for the next 5 days can help reduce this risk. After this 5-day period, you are typically much less likely to be contagious. However, some people, especially people with weakened immune systems, can continue to spread the virus for longer periods of time.     When to Seek Emergency Medical Attention  Look for emergency warning signs* for COVID-19. If someone is showing any of these signs, seek emergency medical care immediately:    Trouble breathing  Persistent pain or pressure in the chest  New confusion  Inability to wake or stay awake  Pale, gray, or blue-colored skin, lips, or nail beds, depending on skin tone  *This list is not all possible symptoms. Please call your medical provider for any other symptoms that are severe or concerning to you.    Call 911 or call ahead to your local emergency facility: Notify the  that you are seeking care for someone who has or may have COVID-19.    Paxlovid literature given; verbalized her understanding.    What advice should be given to patients with known or presumed COVID-19 managed at home?    For most patients with COVID-19 who are managed at home, we advise the following:    ?Supportive care with antipyretics/analgesics (eg, acetaminophen) and hydration    Tylenol or Ibuprofen as needed for fever, sore throat, or headaches.    ?Close contact with their health care  provider.    ?Monitoring for clinical worsening, particularly the development of new or worsening dyspnea, which should prompt clinical evaluation and possible hospitalization    ?Separation from other household members, including pets (eg, staying in a separate room when possible and wearing a mask when in the same room)    ?Frequent hand washing for all family members    ?Frequent disinfection of commonly touched surfaces    Some patients with cough or dyspnea may experience symptomatic improvement with self-proning (resting in the prone rather than the supine position)    All other care is generally supportive, similar to that advised for other acute viral illnesses:    ?We advise that patients stay well hydrated, particularly those patients with sustained or higher fevers, in whom insensible fluid losses may be significant.    ?Cough that is persistent, interferes with sleep, or causes discomfort can be managed with an over-the-counter cough medication (eg, dextromethorphan) or prescription benzonatate, 100 to 200 mg orally three times daily as needed.    ?We advise rest as needed during the acute illness; for patients without hypoxia, frequent repositioning and ambulation is encouraged. In addition, we encourage all patients to advance activity as soon as tolerated during recovery.      Additional instructions:  Separate yourself from other people and animals in your home.  Call ahead before visiting your doctor.  Wear a facemask when around others.  Cover your coughs and sneezes.  Wash your hands often with soap and water; hand  can be used, too.  Avoid sharing personal household items.  Wipe down surfaces used daily.  Monitor your symptoms. Seek prompt medical attention if your illness is worsening (e.g., difficulty breathing).   Before seeking care, call your healthcare provider.  If you have a medical emergency and need to call 911, notify the dispatch personnel that you have, or are being  evaluated for COVID-19. If possible, put on a facemask before emergency medical services arrive.          Reference: About Preventing Spread of Respiratory Viruses When Youre Sick  Respiratory Illnesses  CDC    You must understand that you've received an Urgent Care treatment only and that you may be released before all your medical problems are known or treated. You, the patient, will arrange for follow up care as instructed.  Follow up with your PCP or specialty clinic as directed in the next 1-2 weeks if not improved or as needed.  You can call [phone] to schedule an appointment with the appropriate provider.  If your condition worsens we recommend that you receive another evaluation at the emergency room immediately or contact your primary medical clinics after hours call service to discuss your concerns.  Please return here or go to the Emergency Department for any concerns or worsening of condition.    If you were prescribed a narcotic or controlled medication, do not drive or operate heavy equipment or machinery while taking these medications.    Thank you for choosing [facility]!    Our goal in the Urgent Care is to always provide outstanding medical care. You may receive a survey by mail or e-mail in the next week regarding your experience today. We would greatly appreciate you completing and returning the survey. Your feedback provides us with a way to recognize our staff who provide very good care, and it helps us learn how to improve when your experience was below our aspiration of excellence.      We appreciate you trusting us with your medical care. We hope you feel better soon. We will be happy to take care of you for all of your future medical needs.   This note was prepared using voice-recognition software.  Although efforts are made to proofread the note, some errors may persist in the final document.     Sincerely,    Arnel Scherer DNP, FNP-C           Additional MDM:      Heart Failure Score:   COPD = No

## 2024-07-18 NOTE — PATIENT INSTRUCTIONS
Preventing Spread of Respiratory Viruses When You're Sick    When you may have a respiratory virus...  Stay home and away from others *including people you live with who are not sick) if you have respiratory virus symptoms that are not better explained by another cause. These symptoms including fever, chills, fatigue, cough, runny nose, and headache.      * You can go back to your normal activities when, for at least 24 hours, both are true:     - Your symptoms are better overall AND     - You have not had a fever (and are not using fever-reducing medication)    * When you go back to your normal activities, take added precaution over the next 5 days, such as taking additional steps for  air, hygiene, masks, physical distancing, and/or testing when you will be around other people indoors.   - Keep in mind that you may still be able to spread the virus that made you sick, even if you are feeling better. You are likely to be less contagious at this time, depending on factors like how long you were sick or how sick you were.   - If you develop a fever or you start to feel worse after you have gone back to normal activities, stay home and away from other again until, for at least 24 hours, both are true: your symptoms are improving overall, and you have not had a fever (and are not using fever-reducing medications). Then take added precaution for the next 5 days.     If you never had symptoms but tested positive for a respiratory virus...  You may be contagious. For the next 5 days: take added precautions, such as taking additional steps for  air, hygiene, masks, physical distancing, and/or testing when you will be around other people indoors. This is especially important to protect people with factors that increase their risk of severe illness from respiratory viruses.       How it works...  When you have a respiratory virus infection, you can spread it to others. How long someone can spread the virus  depends on different factors, including how sick they are (severity) and how long their illness lasts (duration). This is not the same for everyone.     When, for at least 24 hours, your symptoms are getting better overall and you have not had a fever (and are not using fever-reducing medications), you are typically less contagious, but it still take more time for your body to fully get rid of the virus. During this time, you may still be able to spread the virus to others. Taking precautions for the next 5 days can help reduce this risk. After this 5-day period, you are typically much less likely to be contagious. However, some people, especially people with weakened immune systems, can continue to spread the virus for longer periods of time.     When to Seek Emergency Medical Attention  Look for emergency warning signs* for COVID-19. If someone is showing any of these signs, seek emergency medical care immediately:    Trouble breathing  Persistent pain or pressure in the chest  New confusion  Inability to wake or stay awake  Pale, gray, or blue-colored skin, lips, or nail beds, depending on skin tone  *This list is not all possible symptoms. Please call your medical provider for any other symptoms that are severe or concerning to you.    Call 911 or call ahead to your local emergency facility: Notify the  that you are seeking care for someone who has or may have COVID-19.    Paxlovid literature given; verbalized her understanding.    What advice should be given to patients with known or presumed COVID-19 managed at home?    For most patients with COVID-19 who are managed at home, we advise the following:    ?Supportive care with antipyretics/analgesics (eg, acetaminophen) and hydration    Tylenol or Ibuprofen as needed for fever, sore throat, or headaches.    ?Close contact with their health care provider.    ?Monitoring for clinical worsening, particularly the development of new or worsening dyspnea,  which should prompt clinical evaluation and possible hospitalization    ?Separation from other household members, including pets (eg, staying in a separate room when possible and wearing a mask when in the same room)    ?Frequent hand washing for all family members    ?Frequent disinfection of commonly touched surfaces    Some patients with cough or dyspnea may experience symptomatic improvement with self-proning (resting in the prone rather than the supine position)    All other care is generally supportive, similar to that advised for other acute viral illnesses:    ?We advise that patients stay well hydrated, particularly those patients with sustained or higher fevers, in whom insensible fluid losses may be significant.    ?Cough that is persistent, interferes with sleep, or causes discomfort can be managed with an over-the-counter cough medication (eg, dextromethorphan) or prescription benzonatate, 100 to 200 mg orally three times daily as needed.    ?We advise rest as needed during the acute illness; for patients without hypoxia, frequent repositioning and ambulation is encouraged. In addition, we encourage all patients to advance activity as soon as tolerated during recovery.      Additional instructions:  Separate yourself from other people and animals in your home.  Call ahead before visiting your doctor.  Wear a facemask when around others.  Cover your coughs and sneezes.  Wash your hands often with soap and water; hand  can be used, too.  Avoid sharing personal household items.  Wipe down surfaces used daily.  Monitor your symptoms. Seek prompt medical attention if your illness is worsening (e.g., difficulty breathing).   Before seeking care, call your healthcare provider.  If you have a medical emergency and need to call 911, notify the dispatch personnel that you have, or are being evaluated for COVID-19. If possible, put on a facemask before emergency medical services  arrive.          Reference: About Preventing Spread of Respiratory Viruses When Youre Sick  Respiratory Illnesses  CDC    You must understand that you've received an Urgent Care treatment only and that you may be released before all your medical problems are known or treated. You, the patient, will arrange for follow up care as instructed.  Follow up with your PCP or specialty clinic as directed in the next 1-2 weeks if not improved or as needed.  You can call [phone] to schedule an appointment with the appropriate provider.  If your condition worsens we recommend that you receive another evaluation at the emergency room immediately or contact your primary medical clinics after hours call service to discuss your concerns.  Please return here or go to the Emergency Department for any concerns or worsening of condition.    If you were prescribed a narcotic or controlled medication, do not drive or operate heavy equipment or machinery while taking these medications.    Thank you for choosing [facility]!    Our goal in the Urgent Care is to always provide outstanding medical care. You may receive a survey by mail or e-mail in the next week regarding your experience today. We would greatly appreciate you completing and returning the survey. Your feedback provides us with a way to recognize our staff who provide very good care, and it helps us learn how to improve when your experience was below our aspiration of excellence.      We appreciate you trusting us with your medical care. We hope you feel better soon. We will be happy to take care of you for all of your future medical needs.   This note was prepared using voice-recognition software.  Although efforts are made to proofread the note, some errors may persist in the final document.     Sincerely,    Arnel Scherer DNP, BARRIE-C

## 2024-08-08 ENCOUNTER — OFFICE VISIT (OUTPATIENT)
Dept: ORTHOPEDICS | Facility: CLINIC | Age: 74
End: 2024-08-08
Payer: MEDICARE

## 2024-08-08 DIAGNOSIS — S52.571D OTHER CLOSED INTRA-ARTICULAR FRACTURE OF DISTAL END OF RIGHT RADIUS WITH ROUTINE HEALING, SUBSEQUENT ENCOUNTER: Primary | ICD-10-CM

## 2024-08-08 PROCEDURE — 1159F MED LIST DOCD IN RCRD: CPT | Mod: CPTII,S$GLB,,

## 2024-08-08 PROCEDURE — 99999 PR PBB SHADOW E&M-EST. PATIENT-LVL III: CPT | Mod: PBBFAC,,,

## 2024-08-08 PROCEDURE — 99024 POSTOP FOLLOW-UP VISIT: CPT | Mod: S$GLB,,,

## 2024-08-08 PROCEDURE — 1160F RVW MEDS BY RX/DR IN RCRD: CPT | Mod: CPTII,S$GLB,,

## 2024-09-18 ENCOUNTER — PATIENT MESSAGE (OUTPATIENT)
Dept: ORTHOPEDICS | Facility: CLINIC | Age: 74
End: 2024-09-18
Payer: MEDICARE

## 2024-09-19 ENCOUNTER — OFFICE VISIT (OUTPATIENT)
Dept: ORTHOPEDICS | Facility: CLINIC | Age: 74
End: 2024-09-19
Payer: MEDICARE

## 2024-09-19 DIAGNOSIS — S52.571D OTHER CLOSED INTRA-ARTICULAR FRACTURE OF DISTAL END OF RIGHT RADIUS WITH ROUTINE HEALING, SUBSEQUENT ENCOUNTER: Primary | ICD-10-CM

## 2024-09-19 PROCEDURE — 1159F MED LIST DOCD IN RCRD: CPT | Mod: CPTII,S$GLB,,

## 2024-09-19 PROCEDURE — 99999 PR PBB SHADOW E&M-EST. PATIENT-LVL III: CPT | Mod: PBBFAC,,,

## 2024-09-19 PROCEDURE — 1160F RVW MEDS BY RX/DR IN RCRD: CPT | Mod: CPTII,S$GLB,,

## 2024-09-19 PROCEDURE — 99024 POSTOP FOLLOW-UP VISIT: CPT | Mod: S$GLB,,,

## 2024-09-19 NOTE — PROGRESS NOTES
Subjective:      Patient ID: Trinity Valenzuela is a 74 y.o. female.    Chief Complaint: Post-op Follow Up    HPI: Trinity Valenzuela returns for a 3 month post-op visit following: ORIF right distal radius fracture (date of surgery 06/19/2024) with Dr. Ngo. Overall, the patient is doing well. The patient reports that pain has been managed without the need of medication. She has completed formal therapy, and has noticed good relief with this.  Patient has been advancing activities as tolerated, doing well with this.  Post-operative complaints include: none.     PAST MEDICAL HISTORY:    Past Medical History:   Diagnosis Date    Depression     GERD (gastroesophageal reflux disease)     Hypertension     Memory loss     Thyroid disease      MEDICATIONS:   Current Outpatient Medications:     azelastine (ASTELIN) 137 mcg (0.1 %) nasal spray, 1 spray (137 mcg total) by Nasal route 2 (two) times daily., Disp: 30 mL, Rfl: 0    citalopram (CELEXA) 40 MG tablet, Take 1 tablet (40 mg total) by mouth once daily., Disp: 90 tablet, Rfl: 3    ergocalciferol (ERGOCALCIFEROL) 50,000 unit Cap, Take 50,000 Units by mouth every 7 days., Disp: , Rfl:     famotidine (PEPCID) 40 MG tablet, Take 40 mg by mouth once daily., Disp: , Rfl:     ibuprofen (ADVIL,MOTRIN) 800 MG tablet, Take 1 tablet (800 mg total) by mouth 3 (three) times daily., Disp: 90 tablet, Rfl: 1    levocetirizine (XYZAL) 5 MG tablet, Take 1 tablet (5 mg total) by mouth every evening., Disp: 90 tablet, Rfl: 0    levothyroxine (SYNTHROID) 100 MCG tablet, Take 100 mcg by mouth before breakfast., Disp: , Rfl:     losartan-hydrochlorothiazide 50-12.5 mg (HYZAAR) 50-12.5 mg per tablet, Take 1 tablet by mouth once daily., Disp: 90 tablet, Rfl: 0    meloxicam (MOBIC) 15 MG tablet, Take 1 tablet (15 mg total) by mouth once daily., Disp: 30 tablet, Rfl: 0    nirmatrelvir-ritonavir (PAXLOVID) 300 mg (150 mg x 2)-100 mg copackaged tablets (EUA), Take 3 tablets by mouth 2 (two) times  daily. Each dose contains 2 nirmatrelvir (pink tablets) and 1 ritonavir (white tablet). Take all 3 tablets together, Disp: 30 tablet, Rfl: 0    omeprazole (PRILOSEC) 20 MG capsule, Take 20 mg by mouth., Disp: , Rfl:     pe/hydrocod/acetaminophen/cpm (HYDROCOD-CPM-PE-ACETAMINOPHEN ORAL), , Disp: , Rfl:     pramipexole (MIRAPEX) 0.25 MG tablet, TAKE 1 OR 2 TABLETS ONE HOUR PRIOR TO BEDTIME, Disp: 180 tablet, Rfl: 0    ALLERGIES:   Review of patient's allergies indicates:  No Known Allergies    Review of Systems:  Constitution: Negative for chills, fever and night sweats.   HENT: Negative for congestion and headaches.    Eyes: Negative for blurred vision or vision loss.  Cardiovascular: Negative for chest pain and syncope.   Respiratory: Negative for cough and shortness of breath.    Endocrine: Negative for polydipsia, polyphagia and polyuria.   Hematologic/Lymphatic: Negative for bleeding problem. Does not bruise/bleed easily.   Skin: Negative for dry skin, itching and rash.   Musculoskeletal: See HPI.   Gastrointestinal: Negative for abdominal pain and bowel incontinence.   Genitourinary: Negative for bladder incontinence and nocturia.   Neurological: Negative for disturbances in coordination, loss of balance and seizures.   Psychiatric/Behavioral: Negative for depression. The patient does not have insomnia.    Allergic/Immunologic: Negative for hives and persistent infections.        Objective:      There were no vitals filed for this visit.    PHYSICAL EXAM:  General: Alert & oriented x3, well-developed and well-nourished, in no acute distress, sitting comfortably in the exam room.  Skin: Warm and dry. Capillary refill less than 2 seconds.   Head: Normocephalic and atraumatic.   Eyes: Sclera appear normal.   Nose: No deformities seen.   Ears: No deformities seen.   Neck: No tracheal deviation present.   Pulmonary/Chest: Breathing unlabored.   Neurological: Alert and oriented to person, place, and time.    Psychiatric: Mood is pleasant and affect appropriate.     RIGHT HAND/WRIST:        Observation/Inspection:    Surgical incision is well healed with appropriate scar formation.  No redness, warmth, drainage, or other signs of infection.  No swelling.         Palpation:   No tenderness to palpation to bony prominences and soft tissues throughout.        Range of Motion:  Wrist: Full to wrist flexion, extension, radial, and ulnar deviation.  Digits: Full to digit MCP, PIP, and DIP flexion and extension without pain or difficulty.        Strength:    strength not tested today.        Neurovascular Exam:  Digits warm and well perfused, brisk capillary refill <3 seconds throughout  Sensation intact to finger pads.   NVI motor/LTS to median, radial, and ulnar nerves; radial pulse 2+    Imaging:    X-Rays: 3 views of right wrist obtained in the Orthopedic clinic today, and independently reviewed, show: There is fixation hardware within the distal radius with no evidence of hardware failure.  There is a fracture of the ulnar styloid process as seen on previous performed 08/08/2024.        Assessment:       1. Other closed intra-articular fracture of distal end of right radius with routine healing, subsequent encounter        Plan:          3 months s/p ORIF right distal radius fracture     Overall patient is doing well post-operatively.    Wound Care:  Continue with regular wound care and scar massage.  Medications:  Continue with Tylenol and/or NSAIDs as needed for pain management.  Antibiotics:  None prescribed today.  No evidence of wound infection.  HEP:  Continue with previously instructed and demonstrated hand/wrist ROM HEP.   Activity Modifications:  Continue to advance activities as tolerated.  Pain Management: Ice compress to the affected area 2-3x a day for 15-20 minutes as needed for pain management.    Follow-Up:  As needed.      All of the patient's questions were answered and the patient will contact us  if they have any questions or concerns in the interim.    Kailey Hickey PA-C  [facility]  Orthopedic Surgery    Medical Dictation software was used during the dictation of portions or the entirety of this medical record.  Phonetic or grammatic errors may exist due to the use of this software. For clarification, refer to the author of the document.

## 2024-12-27 ENCOUNTER — OFFICE VISIT (OUTPATIENT)
Dept: URGENT CARE | Facility: CLINIC | Age: 74
End: 2024-12-27
Payer: MEDICARE

## 2024-12-27 VITALS
HEIGHT: 62 IN | RESPIRATION RATE: 14 BRPM | HEART RATE: 54 BPM | TEMPERATURE: 98 F | WEIGHT: 164 LBS | SYSTOLIC BLOOD PRESSURE: 144 MMHG | DIASTOLIC BLOOD PRESSURE: 76 MMHG | OXYGEN SATURATION: 97 % | BODY MASS INDEX: 30.18 KG/M2

## 2024-12-27 DIAGNOSIS — J02.9 PHARYNGITIS, UNSPECIFIED ETIOLOGY: Primary | ICD-10-CM

## 2024-12-27 RX ORDER — AZITHROMYCIN 250 MG/1
TABLET, FILM COATED ORAL
Qty: 6 TABLET | Refills: 0 | Status: SHIPPED | OUTPATIENT
Start: 2024-12-27

## 2024-12-27 NOTE — PROGRESS NOTES
"Subjective:      Patient ID: Trinity Valenzuela is a 74 y.o. female.    Vitals:  height is 5' 2" (1.575 m) and weight is 74.4 kg (164 lb). Her oral temperature is 97.9 °F (36.6 °C). Her blood pressure is 144/76 (abnormal) and her pulse is 54 (abnormal). Her respiration is 14 and oxygen saturation is 97%.     Chief Complaint: Sinus Problem    Pt complains of sore throat and declines any other symptoms. Symptoms started today and pt has not taken any medications to help.    Sinus Problem  This is a new problem. The current episode started today. The problem is unchanged. There has been no fever. Her pain is at a severity of 7/10. The pain is severe. Associated symptoms include a sore throat. Pertinent negatives include no chills, congestion, coughing, diaphoresis, ear pain, headaches, neck pain, shortness of breath, sinus pressure, sneezing or swollen glands. Past treatments include nothing. The treatment provided no relief.       Constitution: Negative for chills and sweating.   HENT:  Positive for sore throat. Negative for ear pain, congestion and sinus pressure.    Neck: Negative for neck pain.   Respiratory:  Negative for cough and shortness of breath.    Allergic/Immunologic: Negative for sneezing.   Neurological:  Negative for headaches.      Objective:     Physical Exam   Constitutional: She is oriented to person, place, and time. She appears well-developed. She is cooperative.  Non-toxic appearance. She does not appear ill. No distress.   HENT:   Head: Normocephalic and atraumatic.   Ears:   Right Ear: Hearing, tympanic membrane, external ear and ear canal normal.   Left Ear: Hearing, tympanic membrane, external ear and ear canal normal.   Nose: Nose normal. No mucosal edema, rhinorrhea or nasal deformity. No epistaxis. Right sinus exhibits no maxillary sinus tenderness and no frontal sinus tenderness. Left sinus exhibits no maxillary sinus tenderness and no frontal sinus tenderness.   Mouth/Throat: Uvula is " midline, oropharynx is clear and moist and mucous membranes are normal. No trismus in the jaw. Normal dentition. No uvula swelling. No oropharyngeal exudate, posterior oropharyngeal edema or posterior oropharyngeal erythema.   Eyes: Conjunctivae and lids are normal. No scleral icterus.   Neck: Trachea normal and phonation normal. Neck supple. No edema present. No erythema present. No neck rigidity present.   Cardiovascular: Normal rate, regular rhythm, normal heart sounds and normal pulses.   Pulmonary/Chest: Effort normal and breath sounds normal. No respiratory distress. She has no decreased breath sounds. She has no rhonchi.   Abdominal: Normal appearance.   Musculoskeletal: Normal range of motion.         General: No deformity. Normal range of motion.   Neurological: She is alert and oriented to person, place, and time. She exhibits normal muscle tone. Coordination normal.   Skin: Skin is warm, dry, intact, not diaphoretic and not pale.   Psychiatric: Her speech is normal and behavior is normal. Judgment and thought content normal.   Nursing note and vitals reviewed.      Assessment:     1. Pharyngitis, unspecified etiology        Plan:       Pharyngitis, unspecified etiology  -     azithromycin (Z-PIO) 250 MG tablet; Take 2 tablets by mouth on day 1; Take 1 tablet by mouth on days 2-5  Dispense: 6 tablet; Refill: 0        Thank you for choosing [facility]!     Our goal in the Urgent Care is to always provide outstanding medical care. You may receive a survey by mail or e-mail in the next week regarding your experience today. We would greatly appreciate you completing and returning the survey. Your feedback provides us with a way to recognize our staff who provide very good care, and it helps us learn how to improve when your experience was below our aspiration of excellence.       We appreciate you trusting us with your medical care. We hope you feel better soon. We will be happy to take care of you  for all of your future medical needs.  You must understand that you've received an Urgent Care treatment only and that you may be released before all your medical problems are known or treated. You, the patient, will arrange for follow up care as instructed.  Follow up with your PCP or specialty clinic as directed in the next 1-2 weeks if not improved or as needed.  You can call [phone] to schedule an appointment with the appropriate provider.  Another option is to follow up with [facility] ([email or web address]) virtually for quick simple medical advice.  If your condition worsens we recommend that you receive another evaluation at the emergency room immediately or contact your primary medical clinics after hours call service to discuss your concerns.  Please return here or go to the Emergency Department for any concerns or worsening of condition.      *If you were prescribed a narcotic or controlled medication, do not drive or operate heavy equipment or machinery while taking these medications.

## 2025-04-10 ENCOUNTER — TELEPHONE (OUTPATIENT)
Dept: NEUROLOGY | Facility: CLINIC | Age: 75
End: 2025-04-10
Payer: MEDICARE

## 2025-04-10 NOTE — TELEPHONE ENCOUNTER
----- Message from Wanda sent at 4/10/2025 12:06 PM CDT -----  Regarding: Appt  Contact: [phone]  Pt called in to schedule an appt; no available appts in Epic. Pt is asking for a call back soon to schedule. Thanks. Reason appt not schedule: no appt avail Patient's DX: Memory  Patient requesting call back or [facility] msg: Call back

## 2025-04-11 ENCOUNTER — TELEPHONE (OUTPATIENT)
Dept: NEUROLOGY | Facility: CLINIC | Age: 75
End: 2025-04-11
Payer: MEDICARE

## 2025-04-11 NOTE — TELEPHONE ENCOUNTER
----- Message from Maxine sent at 4/11/2025  9:34 AM CDT -----  Regarding: reschedule appt  Type:  Patient Returning CallName of who is calling:ptWhat is request in detail:pt is requesting a call back in regards to rescheduling her appt on 04/22 because she has another appt at same time. Nothing is available on line for me to scheduleCan clinic reply by [facility]:nWt number to call back if not in [facility]:[phone]

## 2025-04-30 ENCOUNTER — TELEPHONE (OUTPATIENT)
Dept: NEUROLOGY | Facility: CLINIC | Age: 75
End: 2025-04-30
Payer: MEDICARE

## 2025-04-30 NOTE — TELEPHONE ENCOUNTER
----- Message from Maxine sent at 4/30/2025 10:15 AM CDT -----  Regarding: Reschedule appt  Type:  Patient Returning CallName of who is calling:ptWhat is request in detail:patient is requesting a call back in regards to rescheduling her appt from 05/23, due to fact she will be at granddaughter graduation. Nothing was available on line to reschedule.Can clinic reply by [facility]:yesWhat number to call back if not in [facility]:[phone]

## 2025-05-07 ENCOUNTER — TELEPHONE (OUTPATIENT)
Dept: NEUROLOGY | Facility: CLINIC | Age: 75
End: 2025-05-07
Payer: MEDICARE

## 2025-05-07 NOTE — TELEPHONE ENCOUNTER
"----- Message from Lexi sent at 5/7/2025 12:42 PM CDT -----  Regarding: Appointment  "Type:  Patient Call BackWho Called:Jennifer is the reqeust in detail:Requesting call back to schedule appt. Please adviseCan the clinic reply by [facility]?no Best Call Back Number: [phone] Information:Pt had to cancel her appt she had on 5/23No available appts showing  "

## 2025-05-12 ENCOUNTER — TELEPHONE (OUTPATIENT)
Dept: NEUROLOGY | Facility: CLINIC | Age: 75
End: 2025-05-12
Payer: MEDICARE

## 2025-05-12 NOTE — TELEPHONE ENCOUNTER
----- Message from Isaiah Teresa sent at 5/7/2025  4:53 PM CDT -----  Regarding: FW: missed call    ----- Message -----  From: Maxine Harrison  Sent: 5/7/2025   4:12 PM CDT  To: Isha Hackett Staff  Subject: missed call                                      Type:  Patient Returning CallWho Called:patientWho Left Message for Patient:Gilbert the patient know what this is regarding?:yesWould the patient rather a call back or a response via [facility]? callBest Call Back Number: [phone] Information:

## 2025-05-18 ENCOUNTER — OFFICE VISIT (OUTPATIENT)
Dept: URGENT CARE | Facility: CLINIC | Age: 75
End: 2025-05-18
Payer: MEDICARE

## 2025-05-18 ENCOUNTER — TELEPHONE (OUTPATIENT)
Dept: URGENT CARE | Facility: CLINIC | Age: 75
End: 2025-05-18
Payer: MEDICARE

## 2025-05-18 VITALS
OXYGEN SATURATION: 98 % | RESPIRATION RATE: 16 BRPM | TEMPERATURE: 98 F | BODY MASS INDEX: 30.18 KG/M2 | SYSTOLIC BLOOD PRESSURE: 129 MMHG | WEIGHT: 164 LBS | DIASTOLIC BLOOD PRESSURE: 82 MMHG | HEIGHT: 62 IN | HEART RATE: 77 BPM

## 2025-05-18 DIAGNOSIS — R06.02 SHORTNESS OF BREATH: Primary | ICD-10-CM

## 2025-05-18 DIAGNOSIS — R05.9 COUGH, UNSPECIFIED TYPE: ICD-10-CM

## 2025-05-18 DIAGNOSIS — Z77.120 MOLD EXPOSURE: ICD-10-CM

## 2025-05-18 PROCEDURE — 99213 OFFICE O/P EST LOW 20 MIN: CPT | Mod: S$GLB,,, | Performed by: NURSE PRACTITIONER

## 2025-05-18 PROCEDURE — 71046 X-RAY EXAM CHEST 2 VIEWS: CPT | Mod: S$GLB,,, | Performed by: RADIOLOGY

## 2025-05-18 RX ORDER — PREDNISONE 20 MG/1
40 TABLET ORAL DAILY
Qty: 10 TABLET | Refills: 0 | Status: SHIPPED | OUTPATIENT
Start: 2025-05-18 | End: 2025-05-23

## 2025-05-18 NOTE — TELEPHONE ENCOUNTER
Called pt w/ cxr results, no acute findings. Pt has seen in pt portal. Left message to call clinic if she has any questions.

## 2025-05-18 NOTE — PATIENT INSTRUCTIONS
- You must understand that you have received an Urgent Care treatment only and that you may be released before all of your medical problems are known or treated.   - You, the patient, will arrange for follow up care as instructed.   - If your condition worsens or fails to improve we recommend that you receive another evaluation at the ER immediately or contact your PCP to discuss your concerns.   - You can call [phone] or [phone] to help schedule an appointment with the appropriate provider.    Drink plenty of fluids   Get lots of rest  Tylenol or ibuprofen for pain/fever  Mucinex DM for cough  Saline nasal rinses to irrigate sinus cavities  Warm salt water gargles for sore throat    A chest xray has been ordered for you, you may have this done today at either our [facility], located at [address], [place] [address]. Or, if you prefer, you may go to our [facility] at [address], [place] [address].

## 2025-05-18 NOTE — PROGRESS NOTES
"Subjective:      Patient ID: Trinity Valenzuela is a 75 y.o. female.    Vitals:  height is 5' 2" (1.575 m) and weight is 74.4 kg (164 lb 0.4 oz). Her oral temperature is 98 °F (36.7 °C). Her blood pressure is 129/82 and her pulse is 77. Her respiration is 16 and oxygen saturation is 98%.     Chief Complaint: Sinus Problem    Pt is a 76 yo female who complains of sinus problems due to exposure to mold. Symptoms started about 3 weeks ago. Pt states other family members also experienced similar symptoms, but their's has resolved. Her symptoms include sinus pressure, nasal congestion, cough, and shortness of breath. Pt states she feels like she cannot get a full, deep breath.     Sinus Problem  This is a new problem. The current episode started 1 to 4 weeks ago. The problem is unchanged. There has been no fever. Associated symptoms include congestion, coughing, shortness of breath and sinus pressure. Treatments tried: dayquil/nyquil.       HENT:  Positive for congestion and sinus pressure.    Respiratory:  Positive for cough and shortness of breath.       Objective:     Physical Exam   Constitutional: She is oriented to person, place, and time. She appears well-developed. She is cooperative.  Non-toxic appearance. She does not appear ill. No distress.   HENT:   Head: Normocephalic and atraumatic.   Ears:   Right Ear: Hearing, tympanic membrane, external ear and ear canal normal.   Left Ear: Hearing, tympanic membrane, external ear and ear canal normal.   Nose: Nose normal. No mucosal edema, rhinorrhea or nasal deformity. No epistaxis. Right sinus exhibits no maxillary sinus tenderness and no frontal sinus tenderness. Left sinus exhibits no maxillary sinus tenderness and no frontal sinus tenderness.   Mouth/Throat: Uvula is midline, oropharynx is clear and moist and mucous membranes are normal. No trismus in the jaw. Normal dentition. No uvula swelling. No oropharyngeal exudate, posterior oropharyngeal edema or posterior " oropharyngeal erythema.   Eyes: Conjunctivae and lids are normal. No scleral icterus.   Neck: Trachea normal and phonation normal. Neck supple. No edema present. No erythema present. No neck rigidity present.   Cardiovascular: Normal rate, regular rhythm, normal heart sounds and normal pulses.   Pulmonary/Chest: Effort normal and breath sounds normal. No respiratory distress. She has no decreased breath sounds. She has no wheezes. She has no rhonchi.   Abdominal: Normal appearance.   Musculoskeletal: Normal range of motion.         General: No deformity. Normal range of motion.   Neurological: She is alert and oriented to person, place, and time. She exhibits normal muscle tone. Coordination normal.   Skin: Skin is warm, dry, intact, not diaphoretic and not pale.   Psychiatric: Her speech is normal and behavior is normal. Judgment and thought content normal.   Nursing note and vitals reviewed.    XR CHEST PA AND LATERAL  Result Date: 5/18/2025  EXAMINATION: XR CHEST PA AND LATERAL CLINICAL HISTORY: Shortness of breath TECHNIQUE: PA and lateral views of the chest were performed. COMPARISON: 02/07/2019. FINDINGS: There are postop changes in the thyroid bed.  There are postoperative changes in the upper abdomen for prior repair of large hiatal hernia.  There is no evidence of a recurrent hiatal hernia. The trachea is unremarkable.  The cardiomediastinal silhouette is within normal limits.  There are no pleural effusions.  There is no evidence of a pneumothorax.  There is no evidence of pneumomediastinum.  No airspace opacity is present. There is no evidence of free air beneath the hemidiaphragms.  The osseous structures are unremarkable.     No acute process. Electronically signed by: Dudley Hermosillo MD Date:    05/18/2025 Time:    15:10      Assessment:     1. Shortness of breath    2. Cough, unspecified type    3. Mold exposure        Plan:       Shortness of breath  -     XR CHEST PA AND LATERAL; Future; Expected date:  05/18/2025  -     predniSONE (DELTASONE) 20 MG tablet; Take 2 tablets (40 mg total) by mouth once daily. for 5 days  Dispense: 10 tablet; Refill: 0    Cough, unspecified type  -     predniSONE (DELTASONE) 20 MG tablet; Take 2 tablets (40 mg total) by mouth once daily. for 5 days  Dispense: 10 tablet; Refill: 0    Mold exposure      Patient Instructions   - You must understand that you have received an Urgent Care treatment only and that you may be released before all of your medical problems are known or treated.   - You, the patient, will arrange for follow up care as instructed.   - If your condition worsens or fails to improve we recommend that you receive another evaluation at the ER immediately or contact your PCP to discuss your concerns.   - You can call [phone] or [phone] to help schedule an appointment with the appropriate provider.    Drink plenty of fluids   Get lots of rest  Tylenol or ibuprofen for pain/fever  Mucinex DM for cough  Saline nasal rinses to irrigate sinus cavities  Warm salt water gargles for sore throat    A chest xray has been ordered for you, you may have this done today at either our [facility], located at [address], [place] [address]. Or, if you prefer, you may go to our [facility] at [address], [place] [address].

## 2025-05-19 ENCOUNTER — TELEPHONE (OUTPATIENT)
Dept: NEUROLOGY | Facility: CLINIC | Age: 75
End: 2025-05-19
Payer: MEDICARE

## 2025-05-19 NOTE — TELEPHONE ENCOUNTER
----- Message from Juana sent at 5/19/2025  1:18 PM CDT -----  Contact: Patient  Type:  Patient CallWho Called: Patient Does the patient know what this is regarding?: Requesting a call back to have appointment scheduled ;please advise Would the patient rather a call back or a response via [facility]?call Best Call Back Number: [phone] Additional Information:

## 2025-06-09 ENCOUNTER — OFFICE VISIT (OUTPATIENT)
Facility: CLINIC | Age: 75
End: 2025-06-09
Payer: MEDICARE

## 2025-06-09 VITALS
WEIGHT: 164 LBS | SYSTOLIC BLOOD PRESSURE: 145 MMHG | HEIGHT: 62 IN | BODY MASS INDEX: 30.18 KG/M2 | DIASTOLIC BLOOD PRESSURE: 73 MMHG | HEART RATE: 77 BPM

## 2025-06-09 DIAGNOSIS — R41.3 MEMORY LOSS: ICD-10-CM

## 2025-06-09 DIAGNOSIS — G25.81 RLS (RESTLESS LEGS SYNDROME): Primary | ICD-10-CM

## 2025-06-09 DIAGNOSIS — S06.9XAS TRAUMATIC BRAIN INJURY, WITH UNKNOWN LOSS OF CONSCIOUSNESS STATUS, SEQUELA: ICD-10-CM

## 2025-06-09 PROCEDURE — 99999 PR PBB SHADOW E&M-EST. PATIENT-LVL IV: CPT | Mod: PBBFAC,,, | Performed by: NEUROLOGICAL SURGERY

## 2025-06-09 PROCEDURE — 1159F MED LIST DOCD IN RCRD: CPT | Mod: CPTII,S$GLB,, | Performed by: NEUROLOGICAL SURGERY

## 2025-06-09 PROCEDURE — 3288F FALL RISK ASSESSMENT DOCD: CPT | Mod: CPTII,S$GLB,, | Performed by: NEUROLOGICAL SURGERY

## 2025-06-09 PROCEDURE — 3078F DIAST BP <80 MM HG: CPT | Mod: CPTII,S$GLB,, | Performed by: NEUROLOGICAL SURGERY

## 2025-06-09 PROCEDURE — 99204 OFFICE O/P NEW MOD 45 MIN: CPT | Mod: S$GLB,,, | Performed by: NEUROLOGICAL SURGERY

## 2025-06-09 PROCEDURE — 3077F SYST BP >= 140 MM HG: CPT | Mod: CPTII,S$GLB,, | Performed by: NEUROLOGICAL SURGERY

## 2025-06-09 PROCEDURE — 1101F PT FALLS ASSESS-DOCD LE1/YR: CPT | Mod: CPTII,S$GLB,, | Performed by: NEUROLOGICAL SURGERY

## 2025-06-09 RX ORDER — MEMANTINE HYDROCHLORIDE 10 MG/1
10 TABLET ORAL DAILY
Qty: 90 TABLET | Refills: 0 | Status: SHIPPED | OUTPATIENT
Start: 2025-06-09 | End: 2025-09-07

## 2025-06-09 RX ORDER — PRAMIPEXOLE DIHYDROCHLORIDE 1 MG/1
TABLET ORAL
Qty: 90 TABLET | Refills: 11 | Status: SHIPPED | OUTPATIENT
Start: 2025-06-09

## 2025-06-09 RX ORDER — DONEPEZIL HYDROCHLORIDE 10 MG/1
10 TABLET, FILM COATED ORAL DAILY
Qty: 90 TABLET | Refills: 3 | Status: SHIPPED | OUTPATIENT
Start: 2025-06-09 | End: 2025-09-07

## 2025-06-09 NOTE — PROGRESS NOTES
Name: Trinity Valenzuela  MRN: [id number]   CSN: [id number]      Date: 06/09/2025      History of Present Illness    CHIEF COMPLAINT:  Ms. Valenzuela presents today for evaluation of memory problems.  Since last visit she has been taking donepezil.  She was referred for neurocognitive evaluation but was lost to follow-up.  Her daughter reports that she continues to have impaired short-term memory judgment and insight, but remains energetic and motivated.  She is compliant with medication and instructions.    NEUROLOGICAL HISTORY:  She has a history of remote traumatic brain injury which could not be corroborated in medical record. MRI of brain WO Contrast from November 6th, 2024 showed mild non-specific lung atrophy and chronic microvascular changes without acute pathology.    RESTLESS LEG SYNDROME:  She has a history of restless leg syndrome and currently reports leg movement urges. She takes Mirapex 1 mg at bedtime but reports experiencing daytime sedation with current dose and is considering dose reduction.    FALLS:  She reports experiencing falls, primarily landing on knees, but these are minor and she is able to get back up fully independently, and denies requiring medical intervention for these injuries.    SLEEP:  She would likely sleep through the night if not for taking her dog out.    MOOD:  She expresses concerns about her mood as she ages, noting that others' worry about her well-being is contributing to her mood difficulties.    MEDICATIONS:  Current medications include Aricept 10 mg daily and Mirapex 1 mg at bedtime.    RECENT LABS:  Fall 2024 labs showed normal B12, folate, thiamine levels, negative Treponema pallidum antibodies, and normal metabolic profile and thyroid functions.      ROS:  General: -fever, -chills, -fatigue, -weight gain, -weight loss  Eyes: -vision changes, -redness, -discharge  ENT: -ear pain, -nasal congestion, -sore throat  Cardiovascular: -chest pain, -palpitations, -lower extremity  "edema  Respiratory: -cough, -shortness of breath  Gastrointestinal: -abdominal pain, -nausea, -vomiting, -diarrhea, -constipation, -blood in stool  Genitourinary: -dysuria, -hematuria, -frequency  Musculoskeletal: -joint pain, -muscle pain  Skin: -rash, -lesion  Neurological: -headache, -dizziness, -numbness, -tingling, +memory loss, +restless legs, +excessive drowsiness, +falling  Psychiatric: -anxiety, -depression, -sleep difficulty, +new or worsening mood changes              Past Medical History: The patient  has a past medical history of Depression, GERD (gastroesophageal reflux disease), Hypertension, Memory loss, and Thyroid disease.    Social History: The patient  reports that she has never smoked. She has never been exposed to tobacco smoke. She has never used smokeless tobacco. She reports that she does not drink alcohol and does not use drugs.    Family History: Their family history includes BRCA 1/2 in her daughter; Breast cancer in her daughter and mother; Cancer in her mother; Diabetes in her father; Hyperlipidemia in her father and mother; Hypertension in her father and mother; Migraines in her brother.    Allergies: Patient has no known allergies.     Meds: Scheduled Meds:  Continuous Infusions:  PRN Meds:.    Exam:  Physical Exam    General: No acute distress. Well-developed. Well-nourished.  Eyes: EOMI. Sclerae anicteric.  HENT: Normocephalic. Atraumatic. Nares patent. Moist oral mucosa.  Ears: Bilateral TMs clear. Bilateral EACs clear.  Cardiovascular: Regular rate. Regular rhythm. No murmurs. No rubs. No gallops. Normal S1, S2.  Respiratory: Normal respiratory effort. Clear to auscultation bilaterally. No rales. No rhonchi. No wheezing.  Abdomen: Soft. Non-tender. Non-distended. Normoactive bowel sounds.  Musculoskeletal: No  obvious deformity.  Extremities: No lower extremity edema.  Skin: Warm. Dry. No rash.          BP (!) 145/73   Pulse 77   Ht 5' 2" (1.575 m)   Wt 74.4 kg (164 lb 0.4 oz)  "  BMI 30.00 kg/m²     Constitutional  Well-developed, well-nourished, appears stated age   Ophthalmoscopic  No papilledema with no hemorrhages or exudates bilaterally   Cardiovascular  Radial pulses 2+ and symmetric, no LE edema bilaterally   Neurological    * Mental status      - Orientation  Oriented to person, place, and situation     - Memory   Intact remote, impaired short-term     - Attention/concentration  Attentive, vigilant during exam     - Language  Naming & repetition intact, +2-step commands     - Fund of knowledge  Impaired short-term memory, judgment, and insight     - Executive  Well-organized thoughts     - Other     * Cranial nerves       - CN II  PERRL, visual fields full to confrontation     - CN III, IV, VI  Extraocular movements full, normal pursuits and saccades     - CN V  Sensation V1 - V3 intact     - CN VII  Face strong and symmetric bilaterally     - CN VIII  Hearing intact bilaterally     - CN IX, X  Palate raises midline and symmetric     - CN XI  SCM and trapezius 5/5 bilaterally     - CN XII  Tongue midline   * Motor  Muscle bulk normal, strength 5/5 throughout   * Sensory   Intact to temperature and vibration throughout   * Coordination  No dysmetria with finger-to-nose or heel-to-shin   * Gait  See below.   * Deep tendon reflexes  2+ and symmetric throughout   Babinski downgoing bilaterally     Laboratory/Radiological:Reviewed  - Results:  No visits with results within 1 Month(s) from this visit.   Latest known visit with results is:   Lab Visit on 04/08/2025   Component Date Value Ref Range Status    Sodium 04/08/2025 142  136 - 145 mmol/L Final    Potassium 04/08/2025 4.4  3.5 - 5.1 mmol/L Final    Chloride 04/08/2025 104  95 - 110 mmol/L Final    CO2 04/08/2025 29  23 - 29 mmol/L Final    Glucose 04/08/2025 104  70 - 110 mg/dL Final    BUN 04/08/2025 10  8 - 23 mg/dL Final    Creatinine 04/08/2025 0.7  0.5 - 1.4 mg/dL Final    Calcium 04/08/2025 9.3  8.7 - 10.5 mg/dL Final     Protein Total 04/08/2025 7.7  6.0 - 8.4 gm/dL Final    Albumin 04/08/2025 3.7  3.5 - 5.2 g/dL Final    Bilirubin Total 04/08/2025 0.9  0.1 - 1.0 mg/dL Final    ALP 04/08/2025 73  40 - 150 unit/L Final    AST 04/08/2025 19  11 - 45 unit/L Final    ALT 04/08/2025 12  10 - 44 unit/L Final    Anion Gap 04/08/2025 9  8 - 16 mmol/L Final    eGFR 04/08/2025 >60  >60 mL/min/1.73/m2 Final    Free T4 04/08/2025 1.10  0.71 - 1.51 ng/dL Final    Cholesterol Total 04/08/2025 172  120 - 199 mg/dL Final    Triglyceride 04/08/2025 96  30 - 150 mg/dL Final    HDL Cholesterol 04/08/2025 63  40 - 75 mg/dL Final    LDL Cholesterol 04/08/2025 89.8  63.0 - 159.0 mg/dL Final    HDL/Cholesterol Ratio 04/08/2025 36.6  20.0 - 50.0 % Final    Cholesterol/HDL Ratio 04/08/2025 2.7  2.0 - 5.0 Final    Non HDL Cholesterol 04/08/2025 109  mg/dL Final    Magnesium  04/08/2025 1.9  1.6 - 2.6 mg/dL Final    T3, Total 04/08/2025 85  60 - 180 ng/dL Final    Vitamin D 04/08/2025 44  30 - 96 ng/mL Final    TSH 04/08/2025 4.386 (H)  0.400 - 4.000 uIU/mL Final       - Independent review of images:  MRI of the brain reviewed and compared to prior scans demonstrating evidence of mild to moderate atrophy and chronic white matter changes appropriate for age and risk factor profile      Assessment & Plan    IMPRESSION:  - Reviewed MRI results showing mild non-specific lung atrophy and chronic microvascular changes, consistent with typical findings for age.  - B vitamins and thyroid function labs WNL.  - Evaluated effectiveness and potential side effects of current medications, including Mirapex (pramipexole) 1 mg tablet at bedtime for restless leg syndrome, with option to try half tablet (0.5 mg) if experiencing daytime sedation.  - Added Namenda (memantine) daily to complement donepezil in preserving cognitive function.    ALZHEIMER'S DISEASE:  - Explained that memantine has minimal side effects and does not interact with other medications or organ systems;  discussed long-term benefits in slowing cognitive decline, even if immediate effects are not noticeable.  - Continued Aricept (donepezil) 10 mg daily for memory preservation.  - Added Namenda (memantine) daily to complement donepezil in preserving cognitive function.  - Referred to [facility] for cognitive evaluation.    EXERCISE THERAPY:  - Emphasized importance of exercise as mcnamara factor in maintaining overall health, mood, and cognitive function.  - Ms. Valenzuela to engage in regular exercise to improve mood, sleep, and overall health.    RESTLESS LEGS SYNDROME:  - Mirapex (pramipexole) 1 mg tablet at bedtime for restless leg syndrome, with option to try half tablet (0.5 mg) if experiencing daytime sedation.    FOLLOW-UP VISIT:  - Follow up after neuropsychological evaluation, likely in a few months.    PLAN SUMMARY:  - Mirapex (pramipexole) 1 mg tablet at bedtime for restless leg syndrome; option to reduce to 0.5 mg if daytime sedation occurs  - Continue Aricept (donepezil) 10 mg daily for memory preservation  - Add Namenda (memantine) 10 mg daily to complement donepezil  - [facility]  - Ms. Valenzuela to engage in regular exercise for overall health improvement  - Follow-up after neuropsychological evaluation, likely in a few months          Risks/benefits, potential side effects of medications, and alternative therapies discussed as appropriate.    This note was generated with the assistance of ambient listening technology. Verbal consent was obtained by the patient and accompanying visitor(s) for the recording of patient appointment to facilitate this note. I attest to having reviewed and edited the generated note for accuracy, though some syntax or spelling errors may persist. Please contact the author of this note for any clarification.       JADON Vieira M.D.